# Patient Record
Sex: MALE | Race: WHITE | NOT HISPANIC OR LATINO | Employment: FULL TIME | URBAN - METROPOLITAN AREA
[De-identification: names, ages, dates, MRNs, and addresses within clinical notes are randomized per-mention and may not be internally consistent; named-entity substitution may affect disease eponyms.]

---

## 2023-12-29 ENCOUNTER — APPOINTMENT (OUTPATIENT)
Dept: RADIOLOGY | Facility: HOSPITAL | Age: 43
End: 2023-12-29
Payer: COMMERCIAL

## 2023-12-29 ENCOUNTER — APPOINTMENT (OUTPATIENT)
Dept: CARDIOLOGY | Facility: HOSPITAL | Age: 43
End: 2023-12-29
Payer: COMMERCIAL

## 2023-12-29 ENCOUNTER — HOSPITAL ENCOUNTER (INPATIENT)
Facility: HOSPITAL | Age: 43
LOS: 9 days | Discharge: HOME | End: 2024-01-07
Attending: STUDENT IN AN ORGANIZED HEALTH CARE EDUCATION/TRAINING PROGRAM | Admitting: INTERNAL MEDICINE
Payer: COMMERCIAL

## 2023-12-29 DIAGNOSIS — F41.9 ANXIETY AND DEPRESSION: ICD-10-CM

## 2023-12-29 DIAGNOSIS — I95.89 HYPOTENSION DUE TO HYPOVOLEMIA: ICD-10-CM

## 2023-12-29 DIAGNOSIS — F17.219 CIGARETTE NICOTINE DEPENDENCE WITH NICOTINE-INDUCED DISORDER: ICD-10-CM

## 2023-12-29 DIAGNOSIS — E87.1 HYPONATREMIA: ICD-10-CM

## 2023-12-29 DIAGNOSIS — F10.930 ALCOHOL WITHDRAWAL SYNDROME, UNCOMPLICATED (MULTI): Primary | ICD-10-CM

## 2023-12-29 DIAGNOSIS — F10.10 ALCOHOL ABUSE: ICD-10-CM

## 2023-12-29 DIAGNOSIS — E13.69 OTHER SPECIFIED DIABETES MELLITUS WITH OTHER SPECIFIED COMPLICATION, WITHOUT LONG-TERM CURRENT USE OF INSULIN (MULTI): ICD-10-CM

## 2023-12-29 DIAGNOSIS — F32.A ANXIETY AND DEPRESSION: ICD-10-CM

## 2023-12-29 DIAGNOSIS — R74.01 TRANSAMINITIS: ICD-10-CM

## 2023-12-29 DIAGNOSIS — I10 PRIMARY HYPERTENSION: ICD-10-CM

## 2023-12-29 DIAGNOSIS — E86.1 HYPOTENSION DUE TO HYPOVOLEMIA: ICD-10-CM

## 2023-12-29 DIAGNOSIS — E86.0 DEHYDRATION: ICD-10-CM

## 2023-12-29 DIAGNOSIS — N17.9 AKI (ACUTE KIDNEY INJURY) (CMS-HCC): ICD-10-CM

## 2023-12-29 DIAGNOSIS — G62.9 NEUROPATHY: ICD-10-CM

## 2023-12-29 DIAGNOSIS — K21.9 CHRONIC GERD: ICD-10-CM

## 2023-12-29 DIAGNOSIS — R07.9 CHEST PAIN, UNSPECIFIED TYPE: ICD-10-CM

## 2023-12-29 PROBLEM — R73.9 HYPERGLYCEMIA: Status: ACTIVE | Noted: 2023-12-29

## 2023-12-29 PROBLEM — R79.89 ELEVATED LFTS: Status: ACTIVE | Noted: 2023-12-29

## 2023-12-29 PROBLEM — F10.982 ALCOHOL-INDUCED INSOMNIA (MULTI): Status: ACTIVE | Noted: 2023-12-29

## 2023-12-29 LAB
ALBUMIN SERPL BCP-MCNC: 4.3 G/DL (ref 3.4–5)
ALP SERPL-CCNC: 96 U/L (ref 33–120)
ALT SERPL W P-5'-P-CCNC: 236 U/L (ref 10–52)
AMPHETAMINES UR QL SCN: NORMAL
ANION GAP BLDV CALCULATED.4IONS-SCNC: 10 MMOL/L (ref 10–25)
ANION GAP SERPL CALC-SCNC: 17 MMOL/L (ref 10–20)
APAP SERPL-MCNC: <10 UG/ML
AST SERPL W P-5'-P-CCNC: 139 U/L (ref 9–39)
BARBITURATES UR QL SCN: NORMAL
BASE EXCESS BLDV CALC-SCNC: -0.5 MMOL/L (ref -2–3)
BASOPHILS # BLD AUTO: 0.07 X10*3/UL (ref 0–0.1)
BASOPHILS NFR BLD AUTO: 0.8 %
BENZODIAZ UR QL SCN: NORMAL
BILIRUB SERPL-MCNC: 0.7 MG/DL (ref 0–1.2)
BNP SERPL-MCNC: 6 PG/ML (ref 0–99)
BODY TEMPERATURE: ABNORMAL
BUN SERPL-MCNC: 10 MG/DL (ref 6–23)
BZE UR QL SCN: NORMAL
CA-I BLDV-SCNC: 1.22 MMOL/L (ref 1.1–1.33)
CALCIUM SERPL-MCNC: 9.4 MG/DL (ref 8.6–10.3)
CANNABINOIDS UR QL SCN: NORMAL
CARDIAC TROPONIN I PNL SERPL HS: 5 NG/L (ref 0–20)
CHLORIDE BLDV-SCNC: 101 MMOL/L (ref 98–107)
CHLORIDE SERPL-SCNC: 96 MMOL/L (ref 98–107)
CO2 SERPL-SCNC: 22 MMOL/L (ref 21–32)
CREAT SERPL-MCNC: 0.84 MG/DL (ref 0.5–1.3)
D DIMER PPP FEU-MCNC: 299 NG/ML FEU
EOSINOPHIL # BLD AUTO: 0.08 X10*3/UL (ref 0–0.7)
EOSINOPHIL NFR BLD AUTO: 0.9 %
ERYTHROCYTE [DISTWIDTH] IN BLOOD BY AUTOMATED COUNT: 13.2 % (ref 11.5–14.5)
ETHANOL SERPL-MCNC: 61 MG/DL
FENTANYL+NORFENTANYL UR QL SCN: NORMAL
FLUAV RNA RESP QL NAA+PROBE: NOT DETECTED
FLUBV RNA RESP QL NAA+PROBE: NOT DETECTED
GFR SERPL CREATININE-BSD FRML MDRD: >90 ML/MIN/1.73M*2
GLUCOSE BLDV-MCNC: 237 MG/DL (ref 74–99)
GLUCOSE SERPL-MCNC: 277 MG/DL (ref 74–99)
HCO3 BLDV-SCNC: 24.2 MMOL/L (ref 22–26)
HCT VFR BLD AUTO: 48 % (ref 41–52)
HCT VFR BLD EST: 51 % (ref 41–52)
HGB BLD-MCNC: 16.6 G/DL (ref 13.5–17.5)
HGB BLDV-MCNC: 17 G/DL (ref 13.5–17.5)
IMM GRANULOCYTES # BLD AUTO: 0.02 X10*3/UL (ref 0–0.7)
IMM GRANULOCYTES NFR BLD AUTO: 0.2 % (ref 0–0.9)
INHALED O2 CONCENTRATION: 21 %
INR PPP: 1 (ref 0.9–1.1)
LACTATE BLDV-SCNC: 2 MMOL/L (ref 0.4–2)
LACTATE BLDV-SCNC: 3.5 MMOL/L (ref 0.4–2)
LYMPHOCYTES # BLD AUTO: 2.07 X10*3/UL (ref 1.2–4.8)
LYMPHOCYTES NFR BLD AUTO: 23.2 %
MAGNESIUM SERPL-MCNC: 2.23 MG/DL (ref 1.6–2.4)
MCH RBC QN AUTO: 35.4 PG (ref 26–34)
MCHC RBC AUTO-ENTMCNC: 34.6 G/DL (ref 32–36)
MCV RBC AUTO: 102 FL (ref 80–100)
MONOCYTES # BLD AUTO: 0.67 X10*3/UL (ref 0.1–1)
MONOCYTES NFR BLD AUTO: 7.5 %
NEUTROPHILS # BLD AUTO: 6 X10*3/UL (ref 1.2–7.7)
NEUTROPHILS NFR BLD AUTO: 67.4 %
NRBC BLD-RTO: 0 /100 WBCS (ref 0–0)
OPIATES UR QL SCN: NORMAL
OXYCODONE+OXYMORPHONE UR QL SCN: NORMAL
OXYHGB MFR BLDV: 88.9 % (ref 45–75)
PCO2 BLDV: 39 MM HG (ref 41–51)
PCP UR QL SCN: NORMAL
PH BLDV: 7.4 PH (ref 7.33–7.43)
PLATELET # BLD AUTO: 212 X10*3/UL (ref 150–450)
PO2 BLDV: 67 MM HG (ref 35–45)
POTASSIUM BLDV-SCNC: 3.7 MMOL/L (ref 3.5–5.3)
POTASSIUM SERPL-SCNC: 3.6 MMOL/L (ref 3.5–5.3)
PROT SERPL-MCNC: 7.5 G/DL (ref 6.4–8.2)
PROTHROMBIN TIME: 11.8 SECONDS (ref 9.8–12.8)
RBC # BLD AUTO: 4.69 X10*6/UL (ref 4.5–5.9)
SALICYLATES SERPL-MCNC: <3 MG/DL
SAO2 % BLDV: 94 % (ref 45–75)
SARS-COV-2 RNA RESP QL NAA+PROBE: NOT DETECTED
SODIUM BLDV-SCNC: 131 MMOL/L (ref 136–145)
SODIUM SERPL-SCNC: 131 MMOL/L (ref 136–145)
TSH SERPL-ACNC: 1.9 MIU/L (ref 0.44–3.98)
WBC # BLD AUTO: 8.9 X10*3/UL (ref 4.4–11.3)

## 2023-12-29 PROCEDURE — 80307 DRUG TEST PRSMV CHEM ANLYZR: CPT | Performed by: NURSE PRACTITIONER

## 2023-12-29 PROCEDURE — 96376 TX/PRO/DX INJ SAME DRUG ADON: CPT

## 2023-12-29 PROCEDURE — 84132 ASSAY OF SERUM POTASSIUM: CPT | Performed by: STUDENT IN AN ORGANIZED HEALTH CARE EDUCATION/TRAINING PROGRAM

## 2023-12-29 PROCEDURE — 87636 SARSCOV2 & INF A&B AMP PRB: CPT | Performed by: STUDENT IN AN ORGANIZED HEALTH CARE EDUCATION/TRAINING PROGRAM

## 2023-12-29 PROCEDURE — 82306 VITAMIN D 25 HYDROXY: CPT | Mod: STJLAB | Performed by: NURSE PRACTITIONER

## 2023-12-29 PROCEDURE — 84484 ASSAY OF TROPONIN QUANT: CPT | Performed by: STUDENT IN AN ORGANIZED HEALTH CARE EDUCATION/TRAINING PROGRAM

## 2023-12-29 PROCEDURE — 96374 THER/PROPH/DIAG INJ IV PUSH: CPT

## 2023-12-29 PROCEDURE — 80053 COMPREHEN METABOLIC PANEL: CPT | Performed by: STUDENT IN AN ORGANIZED HEALTH CARE EDUCATION/TRAINING PROGRAM

## 2023-12-29 PROCEDURE — 83880 ASSAY OF NATRIURETIC PEPTIDE: CPT | Performed by: STUDENT IN AN ORGANIZED HEALTH CARE EDUCATION/TRAINING PROGRAM

## 2023-12-29 PROCEDURE — 93005 ELECTROCARDIOGRAM TRACING: CPT

## 2023-12-29 PROCEDURE — 72100 X-RAY EXAM L-S SPINE 2/3 VWS: CPT | Performed by: RADIOLOGY

## 2023-12-29 PROCEDURE — 85025 COMPLETE CBC W/AUTO DIFF WBC: CPT | Performed by: STUDENT IN AN ORGANIZED HEALTH CARE EDUCATION/TRAINING PROGRAM

## 2023-12-29 PROCEDURE — 83735 ASSAY OF MAGNESIUM: CPT | Performed by: NURSE PRACTITIONER

## 2023-12-29 PROCEDURE — 85610 PROTHROMBIN TIME: CPT | Performed by: STUDENT IN AN ORGANIZED HEALTH CARE EDUCATION/TRAINING PROGRAM

## 2023-12-29 PROCEDURE — 2500000002 HC RX 250 W HCPCS SELF ADMINISTERED DRUGS (ALT 637 FOR MEDICARE OP, ALT 636 FOR OP/ED): Performed by: NURSE PRACTITIONER

## 2023-12-29 PROCEDURE — 71045 X-RAY EXAM CHEST 1 VIEW: CPT | Performed by: RADIOLOGY

## 2023-12-29 PROCEDURE — 2500000001 HC RX 250 WO HCPCS SELF ADMINISTERED DRUGS (ALT 637 FOR MEDICARE OP): Performed by: STUDENT IN AN ORGANIZED HEALTH CARE EDUCATION/TRAINING PROGRAM

## 2023-12-29 PROCEDURE — 2500000004 HC RX 250 GENERAL PHARMACY W/ HCPCS (ALT 636 FOR OP/ED): Performed by: STUDENT IN AN ORGANIZED HEALTH CARE EDUCATION/TRAINING PROGRAM

## 2023-12-29 PROCEDURE — 84443 ASSAY THYROID STIM HORMONE: CPT | Performed by: NURSE PRACTITIONER

## 2023-12-29 PROCEDURE — 83036 HEMOGLOBIN GLYCOSYLATED A1C: CPT | Mod: STJLAB | Performed by: NURSE PRACTITIONER

## 2023-12-29 PROCEDURE — 71045 X-RAY EXAM CHEST 1 VIEW: CPT

## 2023-12-29 PROCEDURE — 96375 TX/PRO/DX INJ NEW DRUG ADDON: CPT

## 2023-12-29 PROCEDURE — 2500000004 HC RX 250 GENERAL PHARMACY W/ HCPCS (ALT 636 FOR OP/ED): Performed by: NURSE PRACTITIONER

## 2023-12-29 PROCEDURE — 1200000002 HC GENERAL ROOM WITH TELEMETRY DAILY

## 2023-12-29 PROCEDURE — 93010 ELECTROCARDIOGRAM REPORT: CPT | Performed by: STUDENT IN AN ORGANIZED HEALTH CARE EDUCATION/TRAINING PROGRAM

## 2023-12-29 PROCEDURE — 99285 EMERGENCY DEPT VISIT HI MDM: CPT | Performed by: STUDENT IN AN ORGANIZED HEALTH CARE EDUCATION/TRAINING PROGRAM

## 2023-12-29 PROCEDURE — S4991 NICOTINE PATCH NONLEGEND: HCPCS | Performed by: NURSE PRACTITIONER

## 2023-12-29 PROCEDURE — 36415 COLL VENOUS BLD VENIPUNCTURE: CPT | Performed by: STUDENT IN AN ORGANIZED HEALTH CARE EDUCATION/TRAINING PROGRAM

## 2023-12-29 PROCEDURE — 83605 ASSAY OF LACTIC ACID: CPT | Performed by: STUDENT IN AN ORGANIZED HEALTH CARE EDUCATION/TRAINING PROGRAM

## 2023-12-29 PROCEDURE — 96372 THER/PROPH/DIAG INJ SC/IM: CPT | Mod: 25

## 2023-12-29 PROCEDURE — 99223 1ST HOSP IP/OBS HIGH 75: CPT | Performed by: NURSE PRACTITIONER

## 2023-12-29 PROCEDURE — 85379 FIBRIN DEGRADATION QUANT: CPT | Performed by: STUDENT IN AN ORGANIZED HEALTH CARE EDUCATION/TRAINING PROGRAM

## 2023-12-29 PROCEDURE — 80143 DRUG ASSAY ACETAMINOPHEN: CPT | Performed by: NURSE PRACTITIONER

## 2023-12-29 PROCEDURE — 2500000001 HC RX 250 WO HCPCS SELF ADMINISTERED DRUGS (ALT 637 FOR MEDICARE OP): Performed by: NURSE PRACTITIONER

## 2023-12-29 PROCEDURE — 72100 X-RAY EXAM L-S SPINE 2/3 VWS: CPT

## 2023-12-29 RX ORDER — POLYETHYLENE GLYCOL 3350 17 G/17G
17 POWDER, FOR SOLUTION ORAL DAILY PRN
Status: DISCONTINUED | OUTPATIENT
Start: 2023-12-29 | End: 2024-01-07 | Stop reason: HOSPADM

## 2023-12-29 RX ORDER — GABAPENTIN 600 MG/1
600 TABLET ORAL 3 TIMES DAILY
Status: ON HOLD | COMMUNITY
End: 2024-01-07 | Stop reason: SDUPTHER

## 2023-12-29 RX ORDER — LORAZEPAM 2 MG/ML
1 INJECTION INTRAMUSCULAR ONCE
Status: COMPLETED | OUTPATIENT
Start: 2023-12-29 | End: 2023-12-29

## 2023-12-29 RX ORDER — LORAZEPAM 2 MG/ML
0.5 INJECTION INTRAMUSCULAR EVERY 2 HOUR PRN
Status: DISCONTINUED | OUTPATIENT
Start: 2023-12-29 | End: 2024-01-02

## 2023-12-29 RX ORDER — BUPRENORPHINE HYDROCHLORIDE AND NALOXONE HYDROCHLORIDE DIHYDRATE 8; 2 MG/1; MG/1
2 TABLET SUBLINGUAL DAILY
COMMUNITY

## 2023-12-29 RX ORDER — ASPIRIN 325 MG
325 TABLET ORAL ONCE
Status: COMPLETED | OUTPATIENT
Start: 2023-12-29 | End: 2023-12-29

## 2023-12-29 RX ORDER — SODIUM CHLORIDE 9 MG/ML
75 INJECTION, SOLUTION INTRAVENOUS CONTINUOUS
Status: ACTIVE | OUTPATIENT
Start: 2023-12-29 | End: 2023-12-30

## 2023-12-29 RX ORDER — LOSARTAN POTASSIUM AND HYDROCHLOROTHIAZIDE 12.5; 1 MG/1; MG/1
1 TABLET ORAL DAILY
COMMUNITY
End: 2024-01-07 | Stop reason: HOSPADM

## 2023-12-29 RX ORDER — ONDANSETRON HYDROCHLORIDE 2 MG/ML
4 INJECTION, SOLUTION INTRAVENOUS ONCE
Status: COMPLETED | OUTPATIENT
Start: 2023-12-29 | End: 2023-12-29

## 2023-12-29 RX ORDER — FOLIC ACID 1 MG/1
1 TABLET ORAL DAILY
Status: DISCONTINUED | OUTPATIENT
Start: 2023-12-29 | End: 2024-01-07 | Stop reason: HOSPADM

## 2023-12-29 RX ORDER — AMLODIPINE BESYLATE 10 MG/1
10 TABLET ORAL DAILY
Status: ON HOLD | COMMUNITY
End: 2024-01-07 | Stop reason: SDUPTHER

## 2023-12-29 RX ORDER — MULTIVIT-MIN/IRON FUM/FOLIC AC 7.5 MG-4
1 TABLET ORAL DAILY
Status: DISCONTINUED | OUTPATIENT
Start: 2023-12-29 | End: 2024-01-07 | Stop reason: HOSPADM

## 2023-12-29 RX ORDER — POTASSIUM CHLORIDE 20 MEQ/1
40 TABLET, EXTENDED RELEASE ORAL ONCE
Status: COMPLETED | OUTPATIENT
Start: 2023-12-29 | End: 2023-12-29

## 2023-12-29 RX ORDER — VENLAFAXINE HYDROCHLORIDE 150 MG/1
150 CAPSULE, EXTENDED RELEASE ORAL DAILY
Status: ON HOLD | COMMUNITY
End: 2024-01-07 | Stop reason: SDUPTHER

## 2023-12-29 RX ORDER — PANTOPRAZOLE SODIUM 40 MG/1
40 TABLET, DELAYED RELEASE ORAL
Status: DISCONTINUED | OUTPATIENT
Start: 2023-12-29 | End: 2024-01-07 | Stop reason: HOSPADM

## 2023-12-29 RX ORDER — IBUPROFEN 200 MG
1 TABLET ORAL DAILY
Status: DISCONTINUED | OUTPATIENT
Start: 2023-12-29 | End: 2024-01-07 | Stop reason: HOSPADM

## 2023-12-29 RX ORDER — ENOXAPARIN SODIUM 100 MG/ML
40 INJECTION SUBCUTANEOUS EVERY 24 HOURS
Status: DISCONTINUED | OUTPATIENT
Start: 2023-12-29 | End: 2024-01-07 | Stop reason: HOSPADM

## 2023-12-29 RX ORDER — LORAZEPAM 2 MG/ML
1 INJECTION INTRAMUSCULAR EVERY 2 HOUR PRN
Status: DISCONTINUED | OUTPATIENT
Start: 2023-12-29 | End: 2024-01-02

## 2023-12-29 RX ORDER — QUETIAPINE FUMARATE 25 MG/1
25 TABLET, FILM COATED ORAL NIGHTLY
Status: DISCONTINUED | OUTPATIENT
Start: 2023-12-29 | End: 2024-01-07 | Stop reason: HOSPADM

## 2023-12-29 RX ORDER — DIAZEPAM 5 MG/1
5 TABLET ORAL EVERY 6 HOURS SCHEDULED
Status: DISCONTINUED | OUTPATIENT
Start: 2023-12-29 | End: 2023-12-31

## 2023-12-29 RX ORDER — BUPRENORPHINE HYDROCHLORIDE AND NALOXONE HYDROCHLORIDE DIHYDRATE 8; 2 MG/1; MG/1
1 TABLET SUBLINGUAL EVERY 12 HOURS
Status: DISCONTINUED | OUTPATIENT
Start: 2023-12-30 | End: 2023-12-30

## 2023-12-29 RX ORDER — HYDROXYZINE HYDROCHLORIDE 25 MG/1
25 TABLET, FILM COATED ORAL 3 TIMES DAILY
Status: ON HOLD | COMMUNITY
End: 2024-01-07 | Stop reason: SDUPTHER

## 2023-12-29 RX ORDER — LANOLIN ALCOHOL/MO/W.PET/CERES
100 CREAM (GRAM) TOPICAL DAILY
Status: DISCONTINUED | OUTPATIENT
Start: 2023-12-29 | End: 2024-01-02

## 2023-12-29 RX ORDER — NICOTINE 7MG/24HR
1 PATCH, TRANSDERMAL 24 HOURS TRANSDERMAL DAILY
Status: DISCONTINUED | OUTPATIENT
Start: 2024-02-09 | End: 2024-01-07 | Stop reason: HOSPADM

## 2023-12-29 RX ORDER — AMLODIPINE BESYLATE 10 MG/1
10 TABLET ORAL DAILY
Status: DISCONTINUED | OUTPATIENT
Start: 2023-12-29 | End: 2024-01-07 | Stop reason: HOSPADM

## 2023-12-29 RX ORDER — ONDANSETRON HYDROCHLORIDE 2 MG/ML
4 INJECTION, SOLUTION INTRAVENOUS EVERY 6 HOURS PRN
Status: DISCONTINUED | OUTPATIENT
Start: 2023-12-29 | End: 2024-01-07 | Stop reason: HOSPADM

## 2023-12-29 RX ORDER — OMEPRAZOLE 20 MG/1
20 CAPSULE, DELAYED RELEASE ORAL
Status: ON HOLD | COMMUNITY
End: 2024-01-07 | Stop reason: SDUPTHER

## 2023-12-29 RX ORDER — VENLAFAXINE HYDROCHLORIDE 75 MG/1
150 CAPSULE, EXTENDED RELEASE ORAL DAILY
Status: DISCONTINUED | OUTPATIENT
Start: 2023-12-29 | End: 2023-12-31

## 2023-12-29 RX ORDER — LORAZEPAM 2 MG/ML
2 INJECTION INTRAMUSCULAR EVERY 2 HOUR PRN
Status: DISCONTINUED | OUTPATIENT
Start: 2023-12-29 | End: 2024-01-02

## 2023-12-29 RX ORDER — GABAPENTIN 600 MG/1
600 TABLET ORAL 3 TIMES DAILY
Status: DISCONTINUED | OUTPATIENT
Start: 2023-12-29 | End: 2024-01-04

## 2023-12-29 RX ADMIN — LORAZEPAM 1 MG: 2 INJECTION, SOLUTION INTRAMUSCULAR; INTRAVENOUS at 10:07

## 2023-12-29 RX ADMIN — LORAZEPAM 2 MG: 2 INJECTION, SOLUTION INTRAMUSCULAR; INTRAVENOUS at 18:58

## 2023-12-29 RX ADMIN — NICOTINE 1 PATCH: 14 PATCH, EXTENDED RELEASE TRANSDERMAL at 16:56

## 2023-12-29 RX ADMIN — LORAZEPAM 2 MG: 2 INJECTION, SOLUTION INTRAMUSCULAR; INTRAVENOUS at 14:53

## 2023-12-29 RX ADMIN — AMLODIPINE BESYLATE 10 MG: 10 TABLET ORAL at 16:56

## 2023-12-29 RX ADMIN — SODIUM CHLORIDE 1000 ML: 9 INJECTION, SOLUTION INTRAVENOUS at 10:27

## 2023-12-29 RX ADMIN — DIAZEPAM 5 MG: 5 TABLET ORAL at 14:37

## 2023-12-29 RX ADMIN — LOSARTAN POTASSIUM: 100 TABLET, FILM COATED ORAL at 19:06

## 2023-12-29 RX ADMIN — LORAZEPAM 1 MG: 2 INJECTION, SOLUTION INTRAMUSCULAR; INTRAVENOUS at 23:21

## 2023-12-29 RX ADMIN — DIAZEPAM 5 MG: 5 TABLET ORAL at 17:50

## 2023-12-29 RX ADMIN — ENOXAPARIN SODIUM 40 MG: 40 INJECTION SUBCUTANEOUS at 16:56

## 2023-12-29 RX ADMIN — PANTOPRAZOLE SODIUM 40 MG: 40 TABLET, DELAYED RELEASE ORAL at 17:50

## 2023-12-29 RX ADMIN — LORAZEPAM 2 MG: 2 INJECTION, SOLUTION INTRAMUSCULAR; INTRAVENOUS at 20:50

## 2023-12-29 RX ADMIN — GABAPENTIN 600 MG: 300 CAPSULE ORAL at 20:49

## 2023-12-29 RX ADMIN — LORAZEPAM 2 MG: 2 INJECTION, SOLUTION INTRAMUSCULAR; INTRAVENOUS at 16:56

## 2023-12-29 RX ADMIN — ONDANSETRON 4 MG: 2 INJECTION INTRAMUSCULAR; INTRAVENOUS at 18:58

## 2023-12-29 RX ADMIN — GABAPENTIN 600 MG: 300 CAPSULE ORAL at 16:56

## 2023-12-29 RX ADMIN — Medication 1 TABLET: at 16:56

## 2023-12-29 RX ADMIN — SODIUM CHLORIDE 75 ML/HR: 9 INJECTION, SOLUTION INTRAVENOUS at 17:51

## 2023-12-29 RX ADMIN — QUETIAPINE FUMARATE 25 MG: 25 TABLET ORAL at 20:49

## 2023-12-29 RX ADMIN — THIAMINE HCL TAB 100 MG 100 MG: 100 TAB at 16:56

## 2023-12-29 RX ADMIN — ASPIRIN 325 MG ORAL TABLET 325 MG: 325 PILL ORAL at 10:07

## 2023-12-29 RX ADMIN — FOLIC ACID 1 MG: 1 TABLET ORAL at 16:56

## 2023-12-29 RX ADMIN — POTASSIUM CHLORIDE 40 MEQ: 1500 TABLET, EXTENDED RELEASE ORAL at 16:56

## 2023-12-29 RX ADMIN — ONDANSETRON 4 MG: 2 INJECTION INTRAMUSCULAR; INTRAVENOUS at 10:07

## 2023-12-29 RX ADMIN — VENLAFAXINE HYDROCHLORIDE 150 MG: 75 CAPSULE, EXTENDED RELEASE ORAL at 19:06

## 2023-12-29 SDOH — SOCIAL STABILITY: SOCIAL INSECURITY: ARE THERE ANY APPARENT SIGNS OF INJURIES/BEHAVIORS THAT COULD BE RELATED TO ABUSE/NEGLECT?: NO

## 2023-12-29 SDOH — SOCIAL STABILITY: SOCIAL INSECURITY: ABUSE: ADULT

## 2023-12-29 SDOH — SOCIAL STABILITY: SOCIAL INSECURITY: WERE YOU ABLE TO COMPLETE ALL THE BEHAVIORAL HEALTH SCREENINGS?: YES

## 2023-12-29 SDOH — SOCIAL STABILITY: SOCIAL INSECURITY: DO YOU FEEL ANYONE HAS EXPLOITED OR TAKEN ADVANTAGE OF YOU FINANCIALLY OR OF YOUR PERSONAL PROPERTY?: NO

## 2023-12-29 SDOH — SOCIAL STABILITY: SOCIAL INSECURITY: DO YOU FEEL UNSAFE GOING BACK TO THE PLACE WHERE YOU ARE LIVING?: NO

## 2023-12-29 SDOH — SOCIAL STABILITY: SOCIAL INSECURITY: HAS ANYONE EVER THREATENED TO HURT YOUR FAMILY OR YOUR PETS?: NO

## 2023-12-29 SDOH — SOCIAL STABILITY: SOCIAL INSECURITY: DOES ANYONE TRY TO KEEP YOU FROM HAVING/CONTACTING OTHER FRIENDS OR DOING THINGS OUTSIDE YOUR HOME?: NO

## 2023-12-29 SDOH — SOCIAL STABILITY: SOCIAL INSECURITY: ARE YOU OR HAVE YOU BEEN THREATENED OR ABUSED PHYSICALLY, EMOTIONALLY, OR SEXUALLY BY ANYONE?: NO

## 2023-12-29 SDOH — SOCIAL STABILITY: SOCIAL INSECURITY: HAVE YOU HAD THOUGHTS OF HARMING ANYONE ELSE?: NO

## 2023-12-29 ASSESSMENT — LIFESTYLE VARIABLES
TOTAL SCORE: 1
ANXIETY: MILDLY ANXIOUS
PAROXYSMAL SWEATS: 3
BLOOD PRESSURE: 168/92
AGITATION: NORMAL ACTIVITY
PAROXYSMAL SWEATS: BARELY PERCEPTIBLE SWEATING, PALMS MOIST
TOTAL SCORE: 12
NAUSEA AND VOMITING: INTERMITTENT NAUSEA WITH DRY HEAVES
AUDIT TOTAL SCORE: 4
PAROXYSMAL SWEATS: BARELY PERCEPTIBLE SWEATING, PALMS MOIST
BLOOD PRESSURE: 145/81
AUDITORY DISTURBANCES: MODERATE HARSHNESS OR ABILITY TO FRIGHTEN
HEADACHE, FULLNESS IN HEAD: NOT PRESENT
AUDITORY DISTURBANCES: NOT PRESENT
SKIP TO QUESTIONS 9-10: 0
AUDITORY DISTURBANCES: NOT PRESENT
ORIENTATION AND CLOUDING OF SENSORIUM: ORIENTED AND CAN DO SERIAL ADDITIONS
NAUSEA AND VOMITING: 3
VISUAL DISTURBANCES: MODERATE SENSITIVITY
BLOOD PRESSURE: 160/88
REASON UNABLE TO ASSESS: NO
NAUSEA AND VOMITING: 3
HOW OFTEN DO YOU HAVE A DRINK CONTAINING ALCOHOL: 4 OR MORE TIMES A WEEK
ANXIETY: NO ANXIETY, AT EASE
VISUAL DISTURBANCES: NOT PRESENT
HAVE PEOPLE ANNOYED YOU BY CRITICIZING YOUR DRINKING: NO
TOTAL SCORE: 8
VISUAL DISTURBANCES: NOT PRESENT
VISUAL DISTURBANCES: NOT PRESENT
TREMOR: NOT VISIBLE, BUT CAN BE FELT FINGERTIP TO FINGERTIP
PAROXYSMAL SWEATS: NO SWEAT VISIBLE
PAROXYSMAL SWEATS: 3
HEADACHE, FULLNESS IN HEAD: VERY MILD
TREMOR: NOT VISIBLE, BUT CAN BE FELT FINGERTIP TO FINGERTIP
VISUAL DISTURBANCES: NOT PRESENT
TACTILE DISTURBANCES: VERY MILD ITCHING, PINS AND NEEDLES, BURNING OR NUMBNESS
HOW OFTEN DURING THE LAST YEAR HAVE YOU FOUND THAT YOU WERE NOT ABLE TO STOP DRINKING ONCE YOU HAD STARTED: DAILY OR ALMOST DAILY
VISUAL DISTURBANCES: NOT PRESENT
TACTILE DISTURBANCES: MODERATE ITCHING, PINS AND NEEDLES, BURNING OR NUMBNESS
ORIENTATION AND CLOUDING OF SENSORIUM: ORIENTED AND CAN DO SERIAL ADDITIONS
NAUSEA AND VOMITING: MILD NAUSEA WITH NO VOMITING
ANXIETY: 5
TREMOR: NOT VISIBLE, BUT CAN BE FELT FINGERTIP TO FINGERTIP
ANXIETY: 5
AUDITORY DISTURBANCES: MODERATE HARSHNESS OR ABILITY TO FRIGHTEN
HEADACHE, FULLNESS IN HEAD: MODERATE
ORIENTATION AND CLOUDING OF SENSORIUM: ORIENTED AND CAN DO SERIAL ADDITIONS
HEADACHE, FULLNESS IN HEAD: VERY MILD
ORIENTATION AND CLOUDING OF SENSORIUM: ORIENTED AND CAN DO SERIAL ADDITIONS
PULSE: 96
PULSE: 99
TACTILE DISTURBANCES: MILD ITCHING, PINS AND NEEDLES, BURNING OR NUMBNESS
ORIENTATION AND CLOUDING OF SENSORIUM: ORIENTED AND CAN DO SERIAL ADDITIONS
ANXIETY: MILDLY ANXIOUS
TACTILE DISTURBANCES: MODERATE ITCHING, PINS AND NEEDLES, BURNING OR NUMBNESS
AGITATION: SOMEWHAT MORE THAN NORMAL ACTIVITY
HOW OFTEN DO YOU HAVE 6 OR MORE DRINKS ON ONE OCCASION: DAILY OR ALMOST DAILY
ANXIETY: 3
HEADACHE, FULLNESS IN HEAD: MODERATE
TREMOR: NOT VISIBLE, BUT CAN BE FELT FINGERTIP TO FINGERTIP
HEADACHE, FULLNESS IN HEAD: MODERATE
PULSE: 95
EVER FELT BAD OR GUILTY ABOUT YOUR DRINKING: NO
TOTAL SCORE: 21
PAROXYSMAL SWEATS: NO SWEAT VISIBLE
HEADACHE, FULLNESS IN HEAD: MODERATE
TREMOR: NO TREMOR
HEADACHE, FULLNESS IN HEAD: NOT PRESENT
PAROXYSMAL SWEATS: NO SWEAT VISIBLE
TOTAL SCORE: 9
AUDITORY DISTURBANCES: MILD HARSHNESS OR ABILITY TO FRIGHTEN
TREMOR: NO TREMOR
AGITATION: SOMEWHAT MORE THAN NORMAL ACTIVITY
TREMOR: NOT VISIBLE, BUT CAN BE FELT FINGERTIP TO FINGERTIP
VISUAL DISTURBANCES: NOT PRESENT
ORIENTATION AND CLOUDING OF SENSORIUM: ORIENTED AND CAN DO SERIAL ADDITIONS
EVER HAD A DRINK FIRST THING IN THE MORNING TO STEADY YOUR NERVES TO GET RID OF A HANGOVER: NO
NAUSEA AND VOMITING: NO NAUSEA AND NO VOMITING
TACTILE DISTURBANCES: MILD ITCHING, PINS AND NEEDLES, BURNING OR NUMBNESS
AGITATION: SOMEWHAT MORE THAN NORMAL ACTIVITY
TACTILE DISTURBANCES: VERY MILD ITCHING, PINS AND NEEDLES, BURNING OR NUMBNESS
ANXIETY: MILDLY ANXIOUS
AUDITORY DISTURBANCES: NOT PRESENT
ORIENTATION AND CLOUDING OF SENSORIUM: ORIENTED AND CAN DO SERIAL ADDITIONS
AUDIT-C TOTAL SCORE: 11
VISUAL DISTURBANCES: MILD SENSITIVITY
ORIENTATION AND CLOUDING OF SENSORIUM: ORIENTED AND CAN DO SERIAL ADDITIONS
ANXIETY: 2
TOTAL SCORE: 19
AGITATION: SOMEWHAT MORE THAN NORMAL ACTIVITY
HOW MANY STANDARD DRINKS CONTAINING ALCOHOL DO YOU HAVE ON A TYPICAL DAY: 7 TO 9
PAROXYSMAL SWEATS: BARELY PERCEPTIBLE SWEATING, PALMS MOIST
NAUSEA AND VOMITING: NO NAUSEA AND NO VOMITING
TOTAL SCORE: 19
TOTAL SCORE: 0
AGITATION: NORMAL ACTIVITY
NAUSEA AND VOMITING: NO NAUSEA AND NO VOMITING
AGITATION: SOMEWHAT MORE THAN NORMAL ACTIVITY
AUDITORY DISTURBANCES: NOT PRESENT
AGITATION: SOMEWHAT MORE THAN NORMAL ACTIVITY
TREMOR: NO TREMOR
AUDITORY DISTURBANCES: VERY MILD HARSHNESS OR ABILITY TO FRIGHTEN
AUDIT-C TOTAL SCORE: 11
NAUSEA AND VOMITING: NO NAUSEA AND NO VOMITING
HAVE YOU EVER FELT YOU SHOULD CUT DOWN ON YOUR DRINKING: NO

## 2023-12-29 ASSESSMENT — COGNITIVE AND FUNCTIONAL STATUS - GENERAL
WALKING IN HOSPITAL ROOM: A LITTLE
MOBILITY SCORE: 20
CLIMB 3 TO 5 STEPS WITH RAILING: A LITTLE
MOVING TO AND FROM BED TO CHAIR: A LITTLE
STANDING UP FROM CHAIR USING ARMS: A LITTLE
DAILY ACTIVITIY SCORE: 23
TOILETING: A LITTLE
PATIENT BASELINE BEDBOUND: NO

## 2023-12-29 ASSESSMENT — PAIN DESCRIPTION - DESCRIPTORS: DESCRIPTORS: SHARP

## 2023-12-29 ASSESSMENT — ENCOUNTER SYMPTOMS
NERVOUS/ANXIOUS: 1
FREQUENCY: 0
ABDOMINAL PAIN: 0
SHORTNESS OF BREATH: 1
CHEST TIGHTNESS: 1
NAUSEA: 1
HEADACHES: 1
UNEXPECTED WEIGHT CHANGE: 1
SEIZURES: 0
PALPITATIONS: 0
HEMATURIA: 0
NUMBNESS: 1
VOMITING: 1
SLEEP DISTURBANCE: 1

## 2023-12-29 ASSESSMENT — PATIENT HEALTH QUESTIONNAIRE - PHQ9
SUM OF ALL RESPONSES TO PHQ9 QUESTIONS 1 & 2: 4
1. LITTLE INTEREST OR PLEASURE IN DOING THINGS: MORE THAN HALF THE DAYS
2. FEELING DOWN, DEPRESSED OR HOPELESS: MORE THAN HALF THE DAYS

## 2023-12-29 ASSESSMENT — ACTIVITIES OF DAILY LIVING (ADL)
DRESSING YOURSELF: INDEPENDENT
PATIENT'S MEMORY ADEQUATE TO SAFELY COMPLETE DAILY ACTIVITIES?: YES
GROOMING: INDEPENDENT
ADEQUATE_TO_COMPLETE_ADL: YES
JUDGMENT_ADEQUATE_SAFELY_COMPLETE_DAILY_ACTIVITIES: NO
BATHING: INDEPENDENT
WALKS IN HOME: INDEPENDENT
TOILETING: INDEPENDENT
HEARING - RIGHT EAR: FUNCTIONAL
FEEDING YOURSELF: INDEPENDENT
LACK_OF_TRANSPORTATION: NO
HEARING - LEFT EAR: FUNCTIONAL

## 2023-12-29 ASSESSMENT — PAIN SCALES - GENERAL: PAINLEVEL_OUTOF10: 5 - MODERATE PAIN

## 2023-12-29 ASSESSMENT — PAIN DESCRIPTION - PAIN TYPE: TYPE: ACUTE PAIN

## 2023-12-29 ASSESSMENT — PAIN DESCRIPTION - DIRECTION: RADIATING_TOWARDS: NECK

## 2023-12-29 ASSESSMENT — COLUMBIA-SUICIDE SEVERITY RATING SCALE - C-SSRS
6. HAVE YOU EVER DONE ANYTHING, STARTED TO DO ANYTHING, OR PREPARED TO DO ANYTHING TO END YOUR LIFE?: NO
1. IN THE PAST MONTH, HAVE YOU WISHED YOU WERE DEAD OR WISHED YOU COULD GO TO SLEEP AND NOT WAKE UP?: NO
2. HAVE YOU ACTUALLY HAD ANY THOUGHTS OF KILLING YOURSELF?: NO

## 2023-12-29 ASSESSMENT — PAIN - FUNCTIONAL ASSESSMENT: PAIN_FUNCTIONAL_ASSESSMENT: 0-10

## 2023-12-29 ASSESSMENT — PAIN DESCRIPTION - FREQUENCY: FREQUENCY: CONSTANT/CONTINUOUS

## 2023-12-29 ASSESSMENT — PAIN DESCRIPTION - PROGRESSION: CLINICAL_PROGRESSION: GRADUALLY WORSENING

## 2023-12-29 ASSESSMENT — PAIN DESCRIPTION - LOCATION: LOCATION: CHEST

## 2023-12-29 NOTE — H&P
History Of Present Illness  Deyvi Araujo is a 43 y.o. male with medical history of alcohol abuse, PTSD, history of polysubstance abuse, HTN, anxiety, depression, and GERD presented to [facility] on 12/29/2023 with complaint of anxiety, chest pain, nausea and vomiting.  Patient endorses severe anxiety to the point that he has nausea with emesis.  Patient complains of insomnia, stating that he has not slept in the last 4 nights.  Patient drinks approximately 10 small bottles of fireball a day.  Patient has trialed trazodone in the past for insomnia and states that this is ineffective.  Patient's last alcohol consumption was last evening.  Denies any history of withdrawal seizures or needing intensive care unit stay.  Patient endorses having numbness and tingling to all 4 extremities this appears to be a chronic problem when looking at previous notes patient's currently on gabapentin for this states it is ineffective.  Endorses having chest tightness and discomfort at night when he is trying to fall asleep.  Patient tells me that he falls asleep and then suddenly wakes up struggling to catch his breath which causes an increase in anxiety.  Patient likely has undiagnosed VU should follow-up outpatient for sleep study    Patient will be admitted to medical floor with telemetry for alcohol withdraw,  elevated LFTs, and hyperglycemia.  Psychiatry consult.    Past Medical History  Past Medical History:   Diagnosis Date    Alcohol abuse     Anxiety and depression     GERD (gastroesophageal reflux disease)     HLD (hyperlipidemia)     HTN (hypertension)     Obesity (BMI 30.0-34.9)     Polysubstance abuse (CMS/[facility])     PTSD (post-traumatic stress disorder)        Surgical History  No past surgical history on file.     Social History  Social History     Tobacco Use    Smoking status: Every Day     Packs/day: .5     Types: Cigarettes    Smokeless tobacco: Never   Substance Use Topics    Drug use: Not Currently        Family  "History  Family History   Problem Relation Name Age of Onset    Alcohol abuse Father          Allergies  Patient has no known allergies.    Review of Systems   Constitutional:  Positive for unexpected weight change.   Respiratory:  Positive for chest tightness and shortness of breath.    Cardiovascular:  Negative for palpitations and leg swelling.   Gastrointestinal:  Positive for nausea and vomiting. Negative for abdominal pain.   Genitourinary:  Negative for frequency and hematuria.   Skin: Negative.    Neurological:  Positive for numbness and headaches. Negative for seizures and syncope.   Psychiatric/Behavioral:  Positive for sleep disturbance. The patient is nervous/anxious.        Physical Exam  Constitutional:       General: He is not in acute distress.     Appearance: He is not ill-appearing, toxic-appearing or diaphoretic.   Eyes:      Extraocular Movements: Extraocular movements intact.      Pupils: Pupils are equal, round, and reactive to light.   Cardiovascular:      Rate and Rhythm: Normal rate and regular rhythm.      Pulses: Normal pulses.      Heart sounds: Normal heart sounds.   Pulmonary:      Effort: Pulmonary effort is normal.      Breath sounds: Normal breath sounds.   Abdominal:      General: Abdomen is flat. Bowel sounds are normal.      Palpations: Abdomen is soft.   Musculoskeletal:         General: Normal range of motion.   Skin:     General: Skin is warm and dry.      Capillary Refill: Capillary refill takes less than 2 seconds.   Neurological:      General: No focal deficit present.      Mental Status: He is alert and oriented to person, place, and time.   Psychiatric:         Behavior: Behavior normal.         Thought Content: Thought content normal.         Judgment: Judgment normal.         Last Recorded Vitals  Visit Vitals  BP (!) 145/98   Pulse 86   Temp 36.8 °C (98.2 °F) (Temporal)   Resp 17   Ht 1.88 m (6' 2\")   Wt 127 kg (280 lb)   SpO2 96%   BMI 35.95 kg/m²   Smoking Status " Every Day   BSA 2.58 m²        Relevant Results  Results for orders placed or performed during the hospital encounter of 12/29/23 (from the past 24 hour(s))   CBC with Differential   Result Value Ref Range    WBC 8.9 4.4 - 11.3 x10*3/uL    nRBC 0.0 0.0 - 0.0 /100 WBCs    RBC 4.69 4.50 - 5.90 x10*6/uL    Hemoglobin 16.6 13.5 - 17.5 g/dL    Hematocrit 48.0 41.0 - 52.0 %     (H) 80 - 100 fL    MCH 35.4 (H) 26.0 - 34.0 pg    MCHC 34.6 32.0 - 36.0 g/dL    RDW 13.2 11.5 - 14.5 %    Platelets 212 150 - 450 x10*3/uL    Neutrophils % 67.4 40.0 - 80.0 %    Immature Granulocytes %, Automated 0.2 0.0 - 0.9 %    Lymphocytes % 23.2 13.0 - 44.0 %    Monocytes % 7.5 2.0 - 10.0 %    Eosinophils % 0.9 0.0 - 6.0 %    Basophils % 0.8 0.0 - 2.0 %    Neutrophils Absolute 6.00 1.20 - 7.70 x10*3/uL    Immature Granulocytes Absolute, Automated 0.02 0.00 - 0.70 x10*3/uL    Lymphocytes Absolute 2.07 1.20 - 4.80 x10*3/uL    Monocytes Absolute 0.67 0.10 - 1.00 x10*3/uL    Eosinophils Absolute 0.08 0.00 - 0.70 x10*3/uL    Basophils Absolute 0.07 0.00 - 0.10 x10*3/uL   Comprehensive Metabolic Panel   Result Value Ref Range    Glucose 277 (H) 74 - 99 mg/dL    Sodium 131 (L) 136 - 145 mmol/L    Potassium 3.6 3.5 - 5.3 mmol/L    Chloride 96 (L) 98 - 107 mmol/L    Bicarbonate 22 21 - 32 mmol/L    Anion Gap 17 10 - 20 mmol/L    Urea Nitrogen 10 6 - 23 mg/dL    Creatinine 0.84 0.50 - 1.30 mg/dL    eGFR >90 >60 mL/min/1.73m*2    Calcium 9.4 8.6 - 10.3 mg/dL    Albumin 4.3 3.4 - 5.0 g/dL    Alkaline Phosphatase 96 33 - 120 U/L    Total Protein 7.5 6.4 - 8.2 g/dL     (H) 9 - 39 U/L    Bilirubin, Total 0.7 0.0 - 1.2 mg/dL     (H) 10 - 52 U/L   Protime-INR   Result Value Ref Range    Protime 11.8 9.8 - 12.8 seconds    INR 1.0 0.9 - 1.1   Troponin I, High Sensitivity   Result Value Ref Range    Troponin I, High Sensitivity 5 0 - 20 ng/L   D-dimer, VTE Exclusion   Result Value Ref Range    D-Dimer, Quantitative VTE Exclusion 299 <=500  ng/mL FEU   BLOOD GAS VENOUS FULL PANEL   Result Value Ref Range    POCT pH, Venous 7.40 7.33 - 7.43 pH    POCT pCO2, Venous 39 (L) 41 - 51 mm Hg    POCT pO2, Venous 67 (H) 35 - 45 mm Hg    POCT SO2, Venous 94 (H) 45 - 75 %    POCT Oxy Hemoglobin, Venous 88.9 (H) 45.0 - 75.0 %    POCT Hematocrit Calculated, Venous 51.0 41.0 - 52.0 %    POCT Sodium, Venous 131 (L) 136 - 145 mmol/L    POCT Potassium, Venous 3.7 3.5 - 5.3 mmol/L    POCT Chloride, Venous 101 98 - 107 mmol/L    POCT Ionized Calicum, Venous 1.22 1.10 - 1.33 mmol/L    POCT Glucose, Venous 237 (H) 74 - 99 mg/dL    POCT Lactate, Venous 3.5 (H) 0.4 - 2.0 mmol/L    POCT Base Excess, Venous -0.5 -2.0 - 3.0 mmol/L    POCT HCO3 Calculated, Venous 24.2 22.0 - 26.0 mmol/L    POCT Hemoglobin, Venous 17.0 13.5 - 17.5 g/dL    POCT Anion Gap, Venous 10.0 10.0 - 25.0 mmol/L    Patient Temperature      FiO2 21 %   Sars-CoV-2 and Influenza A/B PCR   Result Value Ref Range    Flu A Result Not Detected Not Detected    Flu B Result Not Detected Not Detected    Coronavirus 2019, PCR Not Detected Not Detected   Blood Gas Lactic Acid, Venous   Result Value Ref Range    POCT Lactate, Venous 2.0 0.4 - 2.0 mmol/L   Drug Screen, Urine   Result Value Ref Range    Amphetamine Screen, Urine Presumptive Negative Presumptive Negative    Barbiturate Screen, Urine Presumptive Negative Presumptive Negative    Benzodiazepines Screen, Urine Presumptive Negative Presumptive Negative    Cannabinoid Screen, Urine Presumptive Negative Presumptive Negative    Cocaine Metabolite Screen, Urine Presumptive Negative Presumptive Negative    Fentanyl Screen, Urine Presumptive Negative Presumptive Negative    Opiate Screen, Urine Presumptive Negative Presumptive Negative    Oxycodone Screen, Urine Presumptive Negative Presumptive Negative    PCP Screen, Urine Presumptive Negative Presumptive Negative      Imaging:  XR chest 1 view   Final Result   1.  No evidence of acute cardiopulmonary process.                   MACRO:   None        Signed by: Yosef Shukla 12/29/2023 10:49 AM   Dictation workstation:   [id number]      XR lumbar spine 2-3 views    (Results Pending)     EKG:  No results found for this or any previous visit (from the past 4464 hour(s)).  Echo:  No results found for this or any previous visit.    Home Medications  Prior to Admission medications    Medication Sig Start Date End Date Taking? Authorizing Provider   amLODIPine (Norvasc) 10 mg tablet Take 1 tablet (10 mg) by mouth once daily.    Historical Provider, MD   buprenorphine-naloxone (Suboxone) 8-2 mg SL tablet Place 2 tablets under the tongue once daily.    Historical Provider, MD   gabapentin (Neurontin) 600 mg tablet Take 1 tablet (600 mg) by mouth 3 times a day.    Historical Provider, MD   hydrOXYzine HCL (Atarax) 25 mg tablet Take 1 tablet (25 mg) by mouth 3 times a day.    Historical Provider, MD   losartan-hydrochlorothiazide (Hyzaar) 100-12.5 mg tablet Take 1 tablet by mouth once daily.    Historical Provider, MD   omeprazole (PriLOSEC) 20 mg DR capsule Take 1 capsule (20 mg) by mouth once daily in the morning. Take before meals.    Historical Provider, MD   venlafaxine XR (Effexor-XR) 150 mg 24 hr capsule Take 1 capsule (150 mg) by mouth once daily.    Historical Provider, MD       Medications  Scheduled medications  [START ON 12/30/2023] buprenorphine-naloxone, 1 tablet, sublingual, q12h  diazePAM, 5 mg, oral, q6h HORTENSIA  enoxaparin, 40 mg, subcutaneous, q24h  folic acid, 1 mg, oral, Daily  multivitamin with minerals, 1 tablet, oral, Daily  potassium chloride CR, 40 mEq, oral, Once  QUEtiapine, 25 mg, oral, Nightly  thiamine, 100 mg, oral, Daily      Continuous medications     PRN medications  LORazepam, 0.5 mg, q2h PRN   Or  LORazepam, 1 mg, q2h PRN   Or  LORazepam, 2 mg, q2h PRN  ondansetron, 4 mg, q6h PRN  polyethylene glycol, 17 g, Daily PRN        Assessment/Plan   Principal Problem:    Alcohol withdrawal syndrome,  uncomplicated (CMS/HCC)  Active Problems:    Anxiety and depression    Elevated LFTs    Hyperglycemia    Alcohol-induced insomnia (CMS/HCC)  Insomnia   Hx of opiate addiction on Suboxone      Plan:  Admit to medical floor with telemetry every 4 vital signs  Seizure precautions  Psychiatry consult  Valium 5 mg every 6 hours scheduled May use as needed Ativan if needed  CIWA protocol  Elevated LFTs likely secondary to alcohol abuse will trend.  No history of diabetes or hyperglycemia on labs hemoglobin A1c ordered and pending  Patient appears to have chronic numbness and tingling to all extremities will resume gabapentin.  Check B12 and vitamin D levels  Presumed home Suboxone  Seroquel initiated for insomnia  If hemoglobin A1c is elevated should start on oral diabetes regimen    VTE prophylaxis:   DVT prophylaxis: subcutaneous Lovenox    Medication reconciliation to be completed when home medications are verified by pharmacy.   See additional orders for further plan of care.   Further evaluation and management per attending and consulting physicians.      Code Status  Full code    I spent 45 minutes in the professional and overall care of this patient.      Nikky Mei, APRN-CNP  [facility]  Pager [phone]

## 2023-12-29 NOTE — ED PROVIDER NOTES
HPI   Chief Complaint   Patient presents with    Chest Pain     Started 2 days ago, but woke up this morning and his left arm was numb       43-year-old male with a past medical history of anxiety, depression, polysubstance abuse, PTSD, hypertension presents to the emergency department for evaluation of chest pain, onset 2 days ago.  Associated with left arm pain, shortness of breath when trying to sleep.  Patient reports increasing anxiety over the last 2 weeks, vomiting every morning for 2 weeks, able to tolerate p.o. otherwise.  Denies any loss of consciousness, vision changes, speech changes, changes in strength or sensation or coordination.  No falls.    Past medical history: As above  Past surgical history: Reviewed  Social history: Everyday tobacco use, everyday alcohol use, history of polysubstance abuse                          Micky Coma Scale Score: 15                  Patient History   No past medical history on file.  No past surgical history on file.  No family history on file.  Social History     Tobacco Use    Smoking status: Not on file    Smokeless tobacco: Not on file   Substance Use Topics    Alcohol use: Not on file    Drug use: Not on file       Physical Exam   ED Triage Vitals [12/29/23 0910]   Temp Heart Rate Resp BP   36.8 °C (98.2 °F) 100 16 148/79      SpO2 Temp Source Heart Rate Source Patient Position   100 % Temporal Monitor Lying      BP Location FiO2 (%)     Left arm --       Physical Exam  Vitals and nursing note reviewed.   Constitutional:       General: He is not in acute distress.     Appearance: Normal appearance. He is not ill-appearing or toxic-appearing.   HENT:      Mouth/Throat:      Mouth: Mucous membranes are moist.   Eyes:      General:         Right eye: No discharge.         Left eye: No discharge.      Conjunctiva/sclera: Conjunctivae normal.   Cardiovascular:      Rate and Rhythm: Regular rhythm. Tachycardia present.      Pulses: Normal pulses.           Radial  pulses are 2+ on the right side and 2+ on the left side.      Heart sounds: Normal heart sounds.   Pulmonary:      Effort: Pulmonary effort is normal. No respiratory distress.      Breath sounds: Decreased breath sounds present.   Chest:      Chest wall: No mass or deformity.   Abdominal:      General: Abdomen is flat. There is no distension.      Palpations: Abdomen is soft.      Tenderness: There is no abdominal tenderness. There is no rebound.   Musculoskeletal:         General: No swelling or deformity. Normal range of motion.      Cervical back: Normal range of motion. No rigidity.   Skin:     General: Skin is warm.      Capillary Refill: Capillary refill takes less than 2 seconds.      Coloration: Skin is not jaundiced or pale.      Findings: No bruising.   Neurological:      General: No focal deficit present.      Mental Status: He is alert and oriented to person, place, and time.   Psychiatric:         Mood and Affect: Mood normal.         Behavior: Behavior normal.         ED Course & MDM   ED Course as of 12/30/23 2356   Fri Dec 29, 2023   1003 Troponin I, High Sensitivity: 5  Reassuring as pain has been continuous for x2 days [JK]   1007 POCT Lactate, Venous(!): 3.5  IVF ordered [JK]   1048 D-dimer, VTE Exclusion  PE/aortic dissection highly unlikely [JK]      ED Course User Index  [JK] Jose Armando Lee,          Diagnoses as of 12/30/23 2356   Chest pain, unspecified type   Other specified diabetes mellitus with other specified complication, without long-term current use of insulin (CMS/HCC)   Transaminitis   Alcohol abuse   Alcohol withdrawal syndrome, uncomplicated (CMS/HCC)       Medical Decision Making  43-year-old male presenting to the emergency department for evaluation of chest pain.  Everyday smoker, obese, hypertension, nonspecific EKG changes associated with shortness of breath and left arm pain.  Heart score of 4.    EKG sinus tachycardia at a rate of 105 bpm.  Nonspecific T wave  flattening/ST depressions in the inferior leads.  Otherwise no ST elevations new T wave inversions consistent with ACS.    Suspected may be a psychogenic component, possibly exacerbated by alcohol abuse.  Ativan, Zofran ordered.  Patient has diminished lung sounds bilaterally, has a history of tobacco abuse may be developing COPD.    Laboratory evaluation is reassuring.  No evidence of ACS.  Suspect alcohol withdrawal syndrome as a strong component of his presentation today.  Patient may be developing new onset diabetes.  Discussed all labs and imaging at bedside with the patient who agrees with plan for hospitalization.        Procedure  Procedures     Jose Armando Lee,   12/30/23 [id number]

## 2023-12-30 LAB
25(OH)D3 SERPL-MCNC: 21 NG/ML (ref 30–100)
ALBUMIN SERPL BCP-MCNC: 3.4 G/DL (ref 3.4–5)
ALP SERPL-CCNC: 73 U/L (ref 33–120)
ALT SERPL W P-5'-P-CCNC: 160 U/L (ref 10–52)
ANION GAP SERPL CALC-SCNC: 14 MMOL/L (ref 10–20)
AST SERPL W P-5'-P-CCNC: 83 U/L (ref 9–39)
BILIRUB SERPL-MCNC: 0.6 MG/DL (ref 0–1.2)
BUN SERPL-MCNC: 11 MG/DL (ref 6–23)
CALCIUM SERPL-MCNC: 8.6 MG/DL (ref 8.6–10.3)
CHLORIDE SERPL-SCNC: 100 MMOL/L (ref 98–107)
CO2 SERPL-SCNC: 25 MMOL/L (ref 21–32)
CREAT SERPL-MCNC: 0.75 MG/DL (ref 0.5–1.3)
ERYTHROCYTE [DISTWIDTH] IN BLOOD BY AUTOMATED COUNT: 13 % (ref 11.5–14.5)
EST. AVERAGE GLUCOSE BLD GHB EST-MCNC: 120 MG/DL
GFR SERPL CREATININE-BSD FRML MDRD: >90 ML/MIN/1.73M*2
GLUCOSE SERPL-MCNC: 113 MG/DL (ref 74–99)
HBA1C MFR BLD: 5.8 %
HCT VFR BLD AUTO: 41.6 % (ref 41–52)
HGB BLD-MCNC: 13.9 G/DL (ref 13.5–17.5)
MCH RBC QN AUTO: 34.9 PG (ref 26–34)
MCHC RBC AUTO-ENTMCNC: 33.4 G/DL (ref 32–36)
MCV RBC AUTO: 105 FL (ref 80–100)
NRBC BLD-RTO: 0 /100 WBCS (ref 0–0)
PLATELET # BLD AUTO: 170 X10*3/UL (ref 150–450)
POTASSIUM SERPL-SCNC: 3.7 MMOL/L (ref 3.5–5.3)
PROT SERPL-MCNC: 6.5 G/DL (ref 6.4–8.2)
RBC # BLD AUTO: 3.98 X10*6/UL (ref 4.5–5.9)
SODIUM SERPL-SCNC: 135 MMOL/L (ref 136–145)
VIT B12 SERPL-MCNC: 423 PG/ML (ref 211–911)
WBC # BLD AUTO: 6.3 X10*3/UL (ref 4.4–11.3)

## 2023-12-30 PROCEDURE — 2500000004 HC RX 250 GENERAL PHARMACY W/ HCPCS (ALT 636 FOR OP/ED): Performed by: NURSE PRACTITIONER

## 2023-12-30 PROCEDURE — 2500000001 HC RX 250 WO HCPCS SELF ADMINISTERED DRUGS (ALT 637 FOR MEDICARE OP): Performed by: NURSE PRACTITIONER

## 2023-12-30 PROCEDURE — S4991 NICOTINE PATCH NONLEGEND: HCPCS | Performed by: NURSE PRACTITIONER

## 2023-12-30 PROCEDURE — 85027 COMPLETE CBC AUTOMATED: CPT | Performed by: NURSE PRACTITIONER

## 2023-12-30 PROCEDURE — 2500000002 HC RX 250 W HCPCS SELF ADMINISTERED DRUGS (ALT 637 FOR MEDICARE OP, ALT 636 FOR OP/ED): Performed by: NURSE PRACTITIONER

## 2023-12-30 PROCEDURE — 84075 ASSAY ALKALINE PHOSPHATASE: CPT | Performed by: NURSE PRACTITIONER

## 2023-12-30 PROCEDURE — 36415 COLL VENOUS BLD VENIPUNCTURE: CPT | Performed by: NURSE PRACTITIONER

## 2023-12-30 PROCEDURE — 82607 VITAMIN B-12: CPT | Mod: STJLAB | Performed by: NURSE PRACTITIONER

## 2023-12-30 PROCEDURE — 96372 THER/PROPH/DIAG INJ SC/IM: CPT | Performed by: NURSE PRACTITIONER

## 2023-12-30 PROCEDURE — 1200000002 HC GENERAL ROOM WITH TELEMETRY DAILY

## 2023-12-30 PROCEDURE — 90792 PSYCH DIAG EVAL W/MED SRVCS: CPT | Performed by: PSYCHIATRY & NEUROLOGY

## 2023-12-30 RX ORDER — BUPRENORPHINE HYDROCHLORIDE AND NALOXONE HYDROCHLORIDE DIHYDRATE 8; 2 MG/1; MG/1
2 TABLET SUBLINGUAL DAILY
Status: DISCONTINUED | OUTPATIENT
Start: 2023-12-30 | End: 2024-01-07 | Stop reason: HOSPADM

## 2023-12-30 RX ADMIN — LORAZEPAM 1 MG: 2 INJECTION, SOLUTION INTRAMUSCULAR; INTRAVENOUS at 10:17

## 2023-12-30 RX ADMIN — GABAPENTIN 600 MG: 300 CAPSULE ORAL at 14:04

## 2023-12-30 RX ADMIN — DIAZEPAM 5 MG: 5 TABLET ORAL at 12:14

## 2023-12-30 RX ADMIN — GABAPENTIN 600 MG: 300 CAPSULE ORAL at 08:41

## 2023-12-30 RX ADMIN — LORAZEPAM 0.5 MG: 2 INJECTION, SOLUTION INTRAMUSCULAR; INTRAVENOUS at 08:24

## 2023-12-30 RX ADMIN — Medication 1 TABLET: at 08:41

## 2023-12-30 RX ADMIN — LORAZEPAM 2 MG: 2 INJECTION, SOLUTION INTRAMUSCULAR; INTRAVENOUS at 16:06

## 2023-12-30 RX ADMIN — THIAMINE HCL TAB 100 MG 100 MG: 100 TAB at 08:41

## 2023-12-30 RX ADMIN — AMLODIPINE BESYLATE 10 MG: 10 TABLET ORAL at 08:41

## 2023-12-30 RX ADMIN — PANTOPRAZOLE SODIUM 40 MG: 40 TABLET, DELAYED RELEASE ORAL at 06:45

## 2023-12-30 RX ADMIN — FOLIC ACID 1 MG: 1 TABLET ORAL at 08:41

## 2023-12-30 RX ADMIN — LORAZEPAM 2 MG: 2 INJECTION, SOLUTION INTRAMUSCULAR; INTRAVENOUS at 22:19

## 2023-12-30 RX ADMIN — GABAPENTIN 600 MG: 300 CAPSULE ORAL at 20:02

## 2023-12-30 RX ADMIN — LORAZEPAM 1 MG: 2 INJECTION, SOLUTION INTRAMUSCULAR; INTRAVENOUS at 20:03

## 2023-12-30 RX ADMIN — DIAZEPAM 5 MG: 5 TABLET ORAL at 05:54

## 2023-12-30 RX ADMIN — VENLAFAXINE HYDROCHLORIDE 150 MG: 75 CAPSULE, EXTENDED RELEASE ORAL at 08:41

## 2023-12-30 RX ADMIN — LORAZEPAM 2 MG: 2 INJECTION, SOLUTION INTRAMUSCULAR; INTRAVENOUS at 14:04

## 2023-12-30 RX ADMIN — ENOXAPARIN SODIUM 40 MG: 40 INJECTION SUBCUTANEOUS at 12:14

## 2023-12-30 RX ADMIN — DIAZEPAM 5 MG: 5 TABLET ORAL at 00:09

## 2023-12-30 RX ADMIN — DIAZEPAM 5 MG: 5 TABLET ORAL at 18:09

## 2023-12-30 RX ADMIN — ONDANSETRON 4 MG: 2 INJECTION INTRAMUSCULAR; INTRAVENOUS at 22:19

## 2023-12-30 RX ADMIN — NICOTINE 1 PATCH: 14 PATCH, EXTENDED RELEASE TRANSDERMAL at 08:44

## 2023-12-30 RX ADMIN — BUPRENORPHINE AND NALOXONE 2 TABLET: 8; 2 TABLET SUBLINGUAL at 11:33

## 2023-12-30 RX ADMIN — LOSARTAN POTASSIUM: 100 TABLET, FILM COATED ORAL at 08:41

## 2023-12-30 ASSESSMENT — LIFESTYLE VARIABLES
HEADACHE, FULLNESS IN HEAD: VERY MILD
PAROXYSMAL SWEATS: BARELY PERCEPTIBLE SWEATING, PALMS MOIST
AGITATION: SOMEWHAT MORE THAN NORMAL ACTIVITY
ORIENTATION AND CLOUDING OF SENSORIUM: ORIENTED AND CAN DO SERIAL ADDITIONS
TOTAL SCORE: 2
PAROXYSMAL SWEATS: BARELY PERCEPTIBLE SWEATING, PALMS MOIST
VISUAL DISTURBANCES: NOT PRESENT
NAUSEA AND VOMITING: NO NAUSEA AND NO VOMITING
AUDITORY DISTURBANCES: NOT PRESENT
ANXIETY: MILDLY ANXIOUS
TREMOR: NOT VISIBLE, BUT CAN BE FELT FINGERTIP TO FINGERTIP
VISUAL DISTURBANCES: NOT PRESENT
TOTAL SCORE: 10
ORIENTATION AND CLOUDING OF SENSORIUM: ORIENTED AND CAN DO SERIAL ADDITIONS
ORIENTATION AND CLOUDING OF SENSORIUM: ORIENTED AND CAN DO SERIAL ADDITIONS
PAROXYSMAL SWEATS: BARELY PERCEPTIBLE SWEATING, PALMS MOIST
TOTAL SCORE: 2
TOTAL SCORE: 8
TOTAL SCORE: 9
PAROXYSMAL SWEATS: BARELY PERCEPTIBLE SWEATING, PALMS MOIST
NAUSEA AND VOMITING: MILD NAUSEA WITH NO VOMITING
TREMOR: 2
ORIENTATION AND CLOUDING OF SENSORIUM: ORIENTED AND CAN DO SERIAL ADDITIONS
ANXIETY: NO ANXIETY, AT EASE
NAUSEA AND VOMITING: NO NAUSEA AND NO VOMITING
HEADACHE, FULLNESS IN HEAD: NOT PRESENT
VISUAL DISTURBANCES: NOT PRESENT
NAUSEA AND VOMITING: NO NAUSEA AND NO VOMITING
HEADACHE, FULLNESS IN HEAD: VERY MILD
PULSE: 91
HEADACHE, FULLNESS IN HEAD: VERY MILD
PAROXYSMAL SWEATS: BEADS OF SWEAT OBVIOUS ON FOREHEAD
AGITATION: NORMAL ACTIVITY
TREMOR: NOT VISIBLE, BUT CAN BE FELT FINGERTIP TO FINGERTIP
VISUAL DISTURBANCES: NOT PRESENT
ORIENTATION AND CLOUDING OF SENSORIUM: ORIENTED AND CAN DO SERIAL ADDITIONS
AUDITORY DISTURBANCES: NOT PRESENT
AUDITORY DISTURBANCES: NOT PRESENT
AGITATION: NORMAL ACTIVITY
NAUSEA AND VOMITING: MILD NAUSEA WITH NO VOMITING
PAROXYSMAL SWEATS: BARELY PERCEPTIBLE SWEATING, PALMS MOIST
AUDITORY DISTURBANCES: NOT PRESENT
ORIENTATION AND CLOUDING OF SENSORIUM: ORIENTED AND CAN DO SERIAL ADDITIONS
ANXIETY: MILDLY ANXIOUS
ANXIETY: MILDLY ANXIOUS
TREMOR: 2
TOTAL SCORE: 4
TOTAL SCORE: 8
NAUSEA AND VOMITING: NO NAUSEA AND NO VOMITING
HEADACHE, FULLNESS IN HEAD: NOT PRESENT
ORIENTATION AND CLOUDING OF SENSORIUM: ORIENTED AND CAN DO SERIAL ADDITIONS
VISUAL DISTURBANCES: NOT PRESENT
TOTAL SCORE: 4
TOTAL SCORE: 9
TREMOR: NOT VISIBLE, BUT CAN BE FELT FINGERTIP TO FINGERTIP
AUDITORY DISTURBANCES: NOT PRESENT
TREMOR: NOT VISIBLE, BUT CAN BE FELT FINGERTIP TO FINGERTIP
TREMOR: NOT VISIBLE, BUT CAN BE FELT FINGERTIP TO FINGERTIP
TOTAL SCORE: 2
VISUAL DISTURBANCES: NOT PRESENT
HEADACHE, FULLNESS IN HEAD: NOT PRESENT
AUDITORY DISTURBANCES: NOT PRESENT
ANXIETY: NO ANXIETY, AT EASE
PAROXYSMAL SWEATS: BEADS OF SWEAT OBVIOUS ON FOREHEAD
VISUAL DISTURBANCES: NOT PRESENT
ORIENTATION AND CLOUDING OF SENSORIUM: ORIENTED AND CAN DO SERIAL ADDITIONS
AGITATION: NORMAL ACTIVITY
AGITATION: NORMAL ACTIVITY
NAUSEA AND VOMITING: NO NAUSEA AND NO VOMITING
AUDITORY DISTURBANCES: NOT PRESENT
BLOOD PRESSURE: 163/93
AGITATION: NORMAL ACTIVITY
AUDITORY DISTURBANCES: NOT PRESENT
VISUAL DISTURBANCES: NOT PRESENT
ORIENTATION AND CLOUDING OF SENSORIUM: ORIENTED AND CAN DO SERIAL ADDITIONS
NAUSEA AND VOMITING: MILD NAUSEA WITH NO VOMITING
TREMOR: NOT VISIBLE, BUT CAN BE FELT FINGERTIP TO FINGERTIP
AUDITORY DISTURBANCES: NOT PRESENT
HEADACHE, FULLNESS IN HEAD: VERY MILD
HEADACHE, FULLNESS IN HEAD: MILD
AGITATION: NORMAL ACTIVITY
PAROXYSMAL SWEATS: 3
VISUAL DISTURBANCES: NOT PRESENT
AGITATION: NORMAL ACTIVITY
AGITATION: NORMAL ACTIVITY
BLOOD PRESSURE: 148/77
AGITATION: NORMAL ACTIVITY
TREMOR: NOT VISIBLE, BUT CAN BE FELT FINGERTIP TO FINGERTIP
TREMOR: NOT VISIBLE, BUT CAN BE FELT FINGERTIP TO FINGERTIP
HEADACHE, FULLNESS IN HEAD: MODERATE
ANXIETY: 3
AUDITORY DISTURBANCES: NOT PRESENT
ANXIETY: MILDLY ANXIOUS
NAUSEA AND VOMITING: NO NAUSEA AND NO VOMITING
TOTAL SCORE: 7
ANXIETY: MILDLY ANXIOUS
PAROXYSMAL SWEATS: 3
HEADACHE, FULLNESS IN HEAD: VERY MILD
AGITATION: SOMEWHAT MORE THAN NORMAL ACTIVITY
PAROXYSMAL SWEATS: BARELY PERCEPTIBLE SWEATING, PALMS MOIST
NAUSEA AND VOMITING: NO NAUSEA AND NO VOMITING
PAROXYSMAL SWEATS: BARELY PERCEPTIBLE SWEATING, PALMS MOIST
HEADACHE, FULLNESS IN HEAD: VERY MILD
ORIENTATION AND CLOUDING OF SENSORIUM: ORIENTED AND CAN DO SERIAL ADDITIONS
VISUAL DISTURBANCES: NOT PRESENT
VISUAL DISTURBANCES: NOT PRESENT
TREMOR: 2
ANXIETY: 2
ANXIETY: NO ANXIETY, AT EASE
PULSE: 78
ORIENTATION AND CLOUDING OF SENSORIUM: ORIENTED AND CAN DO SERIAL ADDITIONS
NAUSEA AND VOMITING: 3
ANXIETY: MILDLY ANXIOUS
AUDITORY DISTURBANCES: NOT PRESENT

## 2023-12-30 ASSESSMENT — COGNITIVE AND FUNCTIONAL STATUS - GENERAL
MOBILITY SCORE: 24
DAILY ACTIVITIY SCORE: 24

## 2023-12-30 ASSESSMENT — PAIN SCALES - GENERAL
PAINLEVEL_OUTOF10: 0 - NO PAIN
PAINLEVEL_OUTOF10: 2
PAINLEVEL_OUTOF10: 0 - NO PAIN

## 2023-12-30 ASSESSMENT — PAIN - FUNCTIONAL ASSESSMENT: PAIN_FUNCTIONAL_ASSESSMENT: 0-10

## 2023-12-30 NOTE — CONSULTS
Reason for consult: Alcoholism depression    History Of Present Illness  Deyvi Araujo is a 43 y.o. male presenting with alcohol withdrawal symptoms.  Patient is   lives alone with shared custody of 11-year-old son employed at a construction company.    Patient reports that over the last 3 months he has been drinking around 24 shots of liquor per day.  Patient is currently going through withdrawal.  Patient reported that he drinks 2 numbers feeling and to go to sleep.  Patient rated his depression to be 10 out of 10 with 10 being bad.  She denies having any active suicidal thoughts but in the past he has had some passive death wishes.  Patient reported that he was never tried to commit suicide due to his child.  He wants to see his son growing.  Patient denies any audiovisual hallucinations or paranoid thoughts.  Patient reports that he is feeling somewhat tired and groggy with the medication and would like to talk about his mental health symptoms in detail at a later time.    Patient reported that he has been taking Effexor XR Neurontin Atarax and trazodone prescribed by his psychiatrist at [facility].    Patient was admitted to psychiatric unit 1 time many years ago for depression.  He has never attempted suicide in the past.  Patient denies having any access to guns or weapons.    Patient denies abusing any drugs     Past Medical History  He has a past medical history of Alcohol abuse, Anxiety and depression, GERD (gastroesophageal reflux disease), HLD (hyperlipidemia), HTN (hypertension), Obesity (BMI 30.0-34.9), Polysubstance abuse (CMS/HCC), and PTSD (post-traumatic stress disorder).    Surgical History  He has no past surgical history on file.     Social History  He reports that he has been smoking cigarettes. He has been smoking an average of .5 packs per day. He has never used smokeless tobacco. He reports that he does not currently use drugs. No history on file for alcohol use.    "  Allergies  Patient has no known allergies.        Mental Status Exam  General: Alert and oriented x 3  Appearance: Appears stated age  Attitude: Cooperative  Behavior: Normal  Motor Activity: Normal psychomotor activity  Speech: Low in volume and rate coherent  Mood: Depressed and anxious  Affect: Restricted  Thought Process: Normal  Thought Content: Denies hopeless or worthless feeling denies suicidal thoughts  Thought Perception: No audiovisual hallucinations  Cognition: Intact  Insight: Fair  Judgement: Fair    Psychiatric Risk Assessment  High risk of relapse with alcohol    Last Recorded Vitals  Blood pressure (!) 147/94, pulse 80, temperature 36 °C (96.8 °F), temperature source Temporal, resp. rate 18, height 1.88 m (6' 2\"), weight 132 kg (291 lb 7.2 oz), SpO2 95 %.    Relevant Results  Results for orders placed or performed during the hospital encounter of 12/29/23 (from the past 96 hour(s))   CBC with Differential   Result Value Ref Range    WBC 8.9 4.4 - 11.3 x10*3/uL    nRBC 0.0 0.0 - 0.0 /100 WBCs    RBC 4.69 4.50 - 5.90 x10*6/uL    Hemoglobin 16.6 13.5 - 17.5 g/dL    Hematocrit 48.0 41.0 - 52.0 %     (H) 80 - 100 fL    MCH 35.4 (H) 26.0 - 34.0 pg    MCHC 34.6 32.0 - 36.0 g/dL    RDW 13.2 11.5 - 14.5 %    Platelets 212 150 - 450 x10*3/uL    Neutrophils % 67.4 40.0 - 80.0 %    Immature Granulocytes %, Automated 0.2 0.0 - 0.9 %    Lymphocytes % 23.2 13.0 - 44.0 %    Monocytes % 7.5 2.0 - 10.0 %    Eosinophils % 0.9 0.0 - 6.0 %    Basophils % 0.8 0.0 - 2.0 %    Neutrophils Absolute 6.00 1.20 - 7.70 x10*3/uL    Immature Granulocytes Absolute, Automated 0.02 0.00 - 0.70 x10*3/uL    Lymphocytes Absolute 2.07 1.20 - 4.80 x10*3/uL    Monocytes Absolute 0.67 0.10 - 1.00 x10*3/uL    Eosinophils Absolute 0.08 0.00 - 0.70 x10*3/uL    Basophils Absolute 0.07 0.00 - 0.10 x10*3/uL   Comprehensive Metabolic Panel   Result Value Ref Range    Glucose 277 (H) 74 - 99 mg/dL    Sodium 131 (L) 136 - 145 mmol/L    " Potassium 3.6 3.5 - 5.3 mmol/L    Chloride 96 (L) 98 - 107 mmol/L    Bicarbonate 22 21 - 32 mmol/L    Anion Gap 17 10 - 20 mmol/L    Urea Nitrogen 10 6 - 23 mg/dL    Creatinine 0.84 0.50 - 1.30 mg/dL    eGFR >90 >60 mL/min/1.73m*2    Calcium 9.4 8.6 - 10.3 mg/dL    Albumin 4.3 3.4 - 5.0 g/dL    Alkaline Phosphatase 96 33 - 120 U/L    Total Protein 7.5 6.4 - 8.2 g/dL     (H) 9 - 39 U/L    Bilirubin, Total 0.7 0.0 - 1.2 mg/dL     (H) 10 - 52 U/L   Brain Natriuretic Peptide   Result Value Ref Range    BNP 6 0 - 99 pg/mL   Protime-INR   Result Value Ref Range    Protime 11.8 9.8 - 12.8 seconds    INR 1.0 0.9 - 1.1   Troponin I, High Sensitivity   Result Value Ref Range    Troponin I, High Sensitivity 5 0 - 20 ng/L   D-dimer, VTE Exclusion   Result Value Ref Range    D-Dimer, Quantitative VTE Exclusion 299 <=500 ng/mL FEU   Acute Toxicology Panel, Blood   Result Value Ref Range    Acetaminophen <10.0 10.0 - 30.0 ug/mL    Salicylate  <3 4 - 20 mg/dL    Alcohol 61 (H) <=10 mg/dL   Magnesium   Result Value Ref Range    Magnesium 2.23 1.60 - 2.40 mg/dL   TSH with reflex to Free T4 if abnormal   Result Value Ref Range    Thyroid Stimulating Hormone 1.90 0.44 - 3.98 mIU/L   Hemoglobin A1C   Result Value Ref Range    Hemoglobin A1C 5.8 (H) see below %    Estimated Average Glucose 120 Not Established mg/dL   ECG 12 lead   Result Value Ref Range    Ventricular Rate 105 BPM    Atrial Rate 105 BPM    DC Interval 146 ms    QRS Duration 86 ms    QT Interval 348 ms    QTC Calculation(Bazett) 459 ms    P Axis 43 degrees    R Axis 31 degrees    T Axis -13 degrees    QRS Count 17 beats    Q Onset 222 ms    P Onset 149 ms    P Offset 205 ms    T Offset 396 ms    QTC Fredericia 419 ms   BLOOD GAS VENOUS FULL PANEL   Result Value Ref Range    POCT pH, Venous 7.40 7.33 - 7.43 pH    POCT pCO2, Venous 39 (L) 41 - 51 mm Hg    POCT pO2, Venous 67 (H) 35 - 45 mm Hg    POCT SO2, Venous 94 (H) 45 - 75 %    POCT Oxy Hemoglobin, Venous  88.9 (H) 45.0 - 75.0 %    POCT Hematocrit Calculated, Venous 51.0 41.0 - 52.0 %    POCT Sodium, Venous 131 (L) 136 - 145 mmol/L    POCT Potassium, Venous 3.7 3.5 - 5.3 mmol/L    POCT Chloride, Venous 101 98 - 107 mmol/L    POCT Ionized Calicum, Venous 1.22 1.10 - 1.33 mmol/L    POCT Glucose, Venous 237 (H) 74 - 99 mg/dL    POCT Lactate, Venous 3.5 (H) 0.4 - 2.0 mmol/L    POCT Base Excess, Venous -0.5 -2.0 - 3.0 mmol/L    POCT HCO3 Calculated, Venous 24.2 22.0 - 26.0 mmol/L    POCT Hemoglobin, Venous 17.0 13.5 - 17.5 g/dL    POCT Anion Gap, Venous 10.0 10.0 - 25.0 mmol/L    Patient Temperature      FiO2 21 %   Sars-CoV-2 and Influenza A/B PCR   Result Value Ref Range    Flu A Result Not Detected Not Detected    Flu B Result Not Detected Not Detected    Coronavirus 2019, PCR Not Detected Not Detected   Blood Gas Lactic Acid, Venous   Result Value Ref Range    POCT Lactate, Venous 2.0 0.4 - 2.0 mmol/L   Drug Screen, Urine   Result Value Ref Range    Amphetamine Screen, Urine Presumptive Negative Presumptive Negative    Barbiturate Screen, Urine Presumptive Negative Presumptive Negative    Benzodiazepines Screen, Urine Presumptive Negative Presumptive Negative    Cannabinoid Screen, Urine Presumptive Negative Presumptive Negative    Cocaine Metabolite Screen, Urine Presumptive Negative Presumptive Negative    Fentanyl Screen, Urine Presumptive Negative Presumptive Negative    Opiate Screen, Urine Presumptive Negative Presumptive Negative    Oxycodone Screen, Urine Presumptive Negative Presumptive Negative    PCP Screen, Urine Presumptive Negative Presumptive Negative   Vitamin D 25-Hydroxy,Total (for eval of Vitamin D levels)   Result Value Ref Range    Vitamin D, 25-Hydroxy, Total 21 (L) 30 - 100 ng/mL   CBC   Result Value Ref Range    WBC 6.3 4.4 - 11.3 x10*3/uL    nRBC 0.0 0.0 - 0.0 /100 WBCs    RBC 3.98 (L) 4.50 - 5.90 x10*6/uL    Hemoglobin 13.9 13.5 - 17.5 g/dL    Hematocrit 41.6 41.0 - 52.0 %     (H) 80 -  100 fL    MCH 34.9 (H) 26.0 - 34.0 pg    MCHC 33.4 32.0 - 36.0 g/dL    RDW 13.0 11.5 - 14.5 %    Platelets 170 150 - 450 x10*3/uL   Vitamin B12   Result Value Ref Range    Vitamin B12 423 211 - 911 pg/mL   Comprehensive metabolic panel   Result Value Ref Range    Glucose 113 (H) 74 - 99 mg/dL    Sodium 135 (L) 136 - 145 mmol/L    Potassium 3.7 3.5 - 5.3 mmol/L    Chloride 100 98 - 107 mmol/L    Bicarbonate 25 21 - 32 mmol/L    Anion Gap 14 10 - 20 mmol/L    Urea Nitrogen 11 6 - 23 mg/dL    Creatinine 0.75 0.50 - 1.30 mg/dL    eGFR >90 >60 mL/min/1.73m*2    Calcium 8.6 8.6 - 10.3 mg/dL    Albumin 3.4 3.4 - 5.0 g/dL    Alkaline Phosphatase 73 33 - 120 U/L    Total Protein 6.5 6.4 - 8.2 g/dL    AST 83 (H) 9 - 39 U/L    Bilirubin, Total 0.6 0.0 - 1.2 mg/dL     (H) 10 - 52 U/L     ECG 12 lead    Result Date: 12/29/2023  Sinus tachycardia Possible Left atrial enlargement T wave abnormality, consider inferior ischemia Abnormal ECG When compared with ECG of 29-DEC-2023 09:16, (unconfirmed) No significant change was found    XR lumbar spine 2-3 views    Result Date: 12/29/2023  Interpreted By:  Jerod Echavarria, STUDY: XR LUMBAR SPINE 2-3 VIEWS 12/29/2023 1:18 pm   INDICATION: Signs/Symptoms:back pain   COMPARISON: None.   ACCESSION NUMBER(S): [id number]   ORDERING CLINICIAN: ANT COSTA   TECHNIQUE: 3 views of the lumbar spine including AP, lateral and lateral cone-down views were obtained.   FINDINGS: There is no acute fracture identified. The vertebral bodies are well aligned without evidence of subluxation. Mild disc space narrowing and tiny marginal osteophytes are present at the L3-4 level. Mild facet degenerative changes are seen throughout the lumbar spine.       1. No evidence of acute fracture. 2. Degenerative changes throughout the lumbar spine, as described above.   MACRO: None.   Signed by: Jerod Echavarria 12/29/2023 1:21 PM Dictation workstation:   [id number]    XR chest 1 view    Result Date:  12/29/2023  Interpreted By:  Yosef Shukla, STUDY: XR CHEST 1 VIEW;  12/29/2023 10:20 am   INDICATION: Signs/Symptoms:Chest Pain.   COMPARISON: March 14, 2023 CT abdomen and pelvis and chest radiograph   ACCESSION NUMBER(S): [id number]   ORDERING CLINICIAN: FANY GODINEZ   FINDINGS: AP radiograph of the chest was provided.   Overlapping radiopaque leads   CARDIOMEDIASTINAL SILHOUETTE: Cardiomediastinal silhouette is normal in size and configuration.   LUNGS: Lungs are clear.   ABDOMEN: No remarkable upper abdominal findings.   BONES: No acute osseous changes.       1.  No evidence of acute cardiopulmonary process.       MACRO: None   Signed by: Yosef Shukla 12/29/2023 10:49 AM Dictation workstation:   [id number]           Assessment/Plan   Alcohol dependence severe  Substance-induced mood disorder  Major depressive disorder recurrent    Continue with the CIWA protocol  Thiamine and folate supplement  Agree with restarting Effexor  mg.  This dose might need adjusted which I will continue to monitor and change depending on his mental status  Will continue to follow him during the stay in the hospital and make further recommendation as needed           Medication Consent  Medication Consent: risks, benefits, side effects reviewed for all ordered meds    Brown Babb MD

## 2023-12-30 NOTE — CARE PLAN
The patient's goals for the shift include      The clinical goals for the shift include Withdrawal        Problem: Fall/Injury  Goal: Not fall by end of shift  Outcome: Not Progressing  Goal: Be free from injury by end of the shift  Outcome: Not Progressing  Goal: Verbalize understanding of personal risk factors for fall in the hospital  Outcome: Not Progressing  Goal: Verbalize understanding of risk factor reduction measures to prevent injury from fall in the home  Outcome: Not Progressing  Goal: Use assistive devices by end of the shift  Outcome: Not Progressing  Goal: Pace activities to prevent fatigue by end of the shift  Outcome: Not Progressing     Problem: Diabetes  Goal: Achieve decreasing blood glucose levels by end of shift  Outcome: Not Progressing  Goal: Increase stability of blood glucose readings by end of shift  Outcome: Not Progressing  Goal: Decrease in ketones present in urine by end of shift  Outcome: Not Progressing  Goal: Maintain electrolyte levels within acceptable range throughout shift  Outcome: Not Progressing  Goal: Maintain glucose levels >70mg/dl to <250mg/dl throughout shift  Outcome: Not Progressing  Goal: No changes in neurological exam by end of shift  Outcome: Not Progressing  Goal: Learn about and adhere to nutrition recommendations by end of shift  Outcome: Not Progressing  Goal: Vital signs within normal range for age by end of shift  Outcome: Not Progressing  Goal: Increase self care and/or family involovement by end of shift  Outcome: Not Progressing  Goal: Receive DSME education by end of shift  Outcome: Not Progressing

## 2023-12-30 NOTE — H&P
History Of Present Illness  Deyvi Araujo is a 43 y.o. male presenting with 43-year-old patient has a history of PTSD and alcohol abuse as well as polysubstance abuse.  Patient comes in with anxiety chest pain nausea and vomiting.  He states that he is now having issues with alcohol.  He drinks 10-10 small bottles of fireball's a day.  He is tried trazodone for insomnia.  Otherwise he has been having some numbness and tingling in all of his extremities..     Past Medical History  Past Medical History:   Diagnosis Date    Alcohol abuse     Anxiety and depression     GERD (gastroesophageal reflux disease)     HLD (hyperlipidemia)     HTN (hypertension)     Obesity (BMI 30.0-34.9)     Polysubstance abuse (CMS/[facility])     PTSD (post-traumatic stress disorder)        Surgical History  No past surgical history on file.     Social History  He reports that he has been smoking cigarettes. He has been smoking an average of .5 packs per day. He has never used smokeless tobacco. He reports that he does not currently use drugs. No history on file for alcohol use.    Family History  Family History   Problem Relation Name Age of Onset    Alcohol abuse Father          Allergies  Patient has no known allergies.    Review of Systems   Constitutional:  Positive for unexpected weight change.   Respiratory:  Positive for chest tightness and shortness of breath.    Cardiovascular:  Negative for palpitations and leg swelling.   Gastrointestinal:  Positive for nausea and vomiting. Negative for abdominal pain.   Genitourinary:  Negative for frequency and hematuria.   Skin: Negative.    Neurological:  Positive for numbness and headaches. Negative for seizures and syncope.   Psychiatric/Behavioral:  Positive for sleep disturbance. The patient is nervous/anxious.    Physical Exam   Eyes:      Extraocular Movements: Extraocular movements intact.      Pupils: Pupils are equal, round, and reactive to light.   Cardiovascular:      Rate and Rhythm:  "Normal rate and regular rhythm.      Pulses: Normal pulses.      Heart sounds: Normal heart sounds.   Pulmonary:      Effort: Pulmonary effort is normal.      Breath sounds: Normal breath sounds.   Abdominal:      General: Abdomen is flat. Bowel sounds are normal.      Palpations: Abdomen is soft.   Musculoskeletal:         General: Normal range of motion.   Skin:     General: Skin is warm and dry.      Capillary Refill: Capillary refill takes less than 2 seconds.   Neurological:      General: No focal deficit present.      Mental Status: He is alert and oriented to person, place, and time.   Psychiatric:         Behavior: Behavior normal.         Thought Content: Thought content normal  Last Recorded Vitals  Blood pressure (!) 139/101, pulse 80, temperature 36 °C (96.8 °F), temperature source Temporal, resp. rate 16, height 1.88 m (6' 2\"), weight 132 kg (291 lb 7.2 oz), SpO2 95 %.    Relevant Results        Scheduled medications  amLODIPine, 10 mg, oral, Daily  buprenorphine-naloxone, 1 tablet, sublingual, q12h  diazePAM, 5 mg, oral, q6h HORTENSIA  enoxaparin, 40 mg, subcutaneous, q24h  folic acid, 1 mg, oral, Daily  gabapentin, 600 mg, oral, TID  losartan 100 mg, hydroCHLOROthiazide 12.5 mg for Hyzaar 100/12.5, , oral, Daily  multivitamin with minerals, 1 tablet, oral, Daily  nicotine, 1 patch, transdermal, Daily   Followed by  [START ON 2/9/2024] nicotine, 1 patch, transdermal, Daily  pantoprazole, 40 mg, oral, Daily before breakfast  QUEtiapine, 25 mg, oral, Nightly  thiamine, 100 mg, oral, Daily  venlafaxine XR, 150 mg, oral, Daily      Continuous medications     PRN medications  PRN medications: LORazepam **OR** LORazepam **OR** LORazepam, ondansetron, polyethylene glycol       Assessment/Plan   Principal Problem:    Alcohol withdrawal syndrome, uncomplicated (CMS/HCC)  Active Problems:    Anxiety and depression    Elevated LFTs    Hyperglycemia    Alcohol-induced insomnia (CMS/HCC)    Neuropathy      CIWA " protocol  Electrolyte repletion  IV fluids  Monitor LFTs  DVT prophylaxis  Continue current care       I spent 39 minutes in the professional and overall care of this patient.      David Chadwick MD

## 2023-12-30 NOTE — CARE PLAN
The clinical goals for the shift include Pts withdrawl symptoms will remain tolerable with medication assistance throughout shift 12/30 1700    Pt was medicated appropriately.  Poor appetite throughout shift.  Consulted by psych earlier in shift with no medication changes at this time.

## 2023-12-30 NOTE — PROGRESS NOTES
Spiritual Care Visit    Clinical Encounter Type  Visited With: Patient  Routine Visit: Introduction          Patient communicated he is on day 2/3 of detoxing from alcohol.  Stated his friend brought him to the ER for intoxication.  Stated he is sick, physically, and sick mentally - sick and tired of the drinking.  Stated he has done programs in the past, AA does not work for him for reasons that go into his childhood trauma, going through a divorce and employment issues.  We talked briefly about his trauma and the long lasting affects into adulthood.  Patient was very open, however, was unable to contribute details as he stated his mind was not where it needed to be.  I was grateful for the patients acceptance to me and the ability to talk candidly about personal issues.  I encouraged him to find a trauma counseling and begin treatment for the trauma.  He agreed.                                  Taxonomy  Intended Effects: Build relationship of care and support, Convey a calming presence, Demonstrate caring and concern, Lessen someone's feelings of isolation, Lessen anxiety, Journeying with someone in the grief process, Helping someone feel comforted, Preserve dignity and respect, Promote sense of peace

## 2023-12-30 NOTE — NURSING NOTE
Patient alert and oriented x 3.  On CIWA Protocol .  Patient was medicated with ativan 2 mg iv at 2050.  Then 1 mg iv at 2321.  Patient also medicated with schedued valium 5 mg po. Patient slept rest of night.  Bed alarm on and safety maintained.

## 2023-12-31 LAB
ALBUMIN SERPL BCP-MCNC: 3.9 G/DL (ref 3.4–5)
ALP SERPL-CCNC: 89 U/L (ref 33–120)
ALT SERPL W P-5'-P-CCNC: 157 U/L (ref 10–52)
ANION GAP SERPL CALC-SCNC: 12 MMOL/L (ref 10–20)
AST SERPL W P-5'-P-CCNC: 89 U/L (ref 9–39)
BILIRUB SERPL-MCNC: 0.9 MG/DL (ref 0–1.2)
BUN SERPL-MCNC: 11 MG/DL (ref 6–23)
CALCIUM SERPL-MCNC: 9.3 MG/DL (ref 8.6–10.3)
CHLORIDE SERPL-SCNC: 98 MMOL/L (ref 98–107)
CO2 SERPL-SCNC: 27 MMOL/L (ref 21–32)
CREAT SERPL-MCNC: 0.71 MG/DL (ref 0.5–1.3)
ERYTHROCYTE [DISTWIDTH] IN BLOOD BY AUTOMATED COUNT: 12.6 % (ref 11.5–14.5)
GFR SERPL CREATININE-BSD FRML MDRD: >90 ML/MIN/1.73M*2
GLUCOSE SERPL-MCNC: 104 MG/DL (ref 74–99)
HCT VFR BLD AUTO: 44.5 % (ref 41–52)
HGB BLD-MCNC: 15.4 G/DL (ref 13.5–17.5)
MCH RBC QN AUTO: 35.2 PG (ref 26–34)
MCHC RBC AUTO-ENTMCNC: 34.6 G/DL (ref 32–36)
MCV RBC AUTO: 102 FL (ref 80–100)
NRBC BLD-RTO: 0 /100 WBCS (ref 0–0)
PLATELET # BLD AUTO: 208 X10*3/UL (ref 150–450)
POTASSIUM SERPL-SCNC: 3.5 MMOL/L (ref 3.5–5.3)
PROT SERPL-MCNC: 7.4 G/DL (ref 6.4–8.2)
RBC # BLD AUTO: 4.37 X10*6/UL (ref 4.5–5.9)
SODIUM SERPL-SCNC: 133 MMOL/L (ref 136–145)
WBC # BLD AUTO: 6.3 X10*3/UL (ref 4.4–11.3)

## 2023-12-31 PROCEDURE — 80053 COMPREHEN METABOLIC PANEL: CPT | Performed by: NURSE PRACTITIONER

## 2023-12-31 PROCEDURE — S4991 NICOTINE PATCH NONLEGEND: HCPCS | Performed by: NURSE PRACTITIONER

## 2023-12-31 PROCEDURE — 96372 THER/PROPH/DIAG INJ SC/IM: CPT | Performed by: NURSE PRACTITIONER

## 2023-12-31 PROCEDURE — 2500000002 HC RX 250 W HCPCS SELF ADMINISTERED DRUGS (ALT 637 FOR MEDICARE OP, ALT 636 FOR OP/ED): Performed by: NURSE PRACTITIONER

## 2023-12-31 PROCEDURE — 1200000002 HC GENERAL ROOM WITH TELEMETRY DAILY

## 2023-12-31 PROCEDURE — 85027 COMPLETE CBC AUTOMATED: CPT | Performed by: NURSE PRACTITIONER

## 2023-12-31 PROCEDURE — 2500000001 HC RX 250 WO HCPCS SELF ADMINISTERED DRUGS (ALT 637 FOR MEDICARE OP): Performed by: NURSE PRACTITIONER

## 2023-12-31 PROCEDURE — 99232 SBSQ HOSP IP/OBS MODERATE 35: CPT | Performed by: PSYCHIATRY & NEUROLOGY

## 2023-12-31 PROCEDURE — 2500000004 HC RX 250 GENERAL PHARMACY W/ HCPCS (ALT 636 FOR OP/ED): Performed by: NURSE PRACTITIONER

## 2023-12-31 PROCEDURE — 2500000002 HC RX 250 W HCPCS SELF ADMINISTERED DRUGS (ALT 637 FOR MEDICARE OP, ALT 636 FOR OP/ED): Performed by: PHYSICIAN ASSISTANT

## 2023-12-31 PROCEDURE — 36415 COLL VENOUS BLD VENIPUNCTURE: CPT | Performed by: NURSE PRACTITIONER

## 2023-12-31 RX ORDER — DIAZEPAM 5 MG/1
5 TABLET ORAL EVERY 12 HOURS
Status: DISCONTINUED | OUTPATIENT
Start: 2023-12-31 | End: 2024-01-02

## 2023-12-31 RX ADMIN — DIAZEPAM 5 MG: 5 TABLET ORAL at 12:35

## 2023-12-31 RX ADMIN — ONDANSETRON 4 MG: 2 INJECTION INTRAMUSCULAR; INTRAVENOUS at 10:40

## 2023-12-31 RX ADMIN — THIAMINE HCL TAB 100 MG 100 MG: 100 TAB at 08:14

## 2023-12-31 RX ADMIN — NICOTINE 1 PATCH: 14 PATCH, EXTENDED RELEASE TRANSDERMAL at 08:13

## 2023-12-31 RX ADMIN — AMLODIPINE BESYLATE 10 MG: 10 TABLET ORAL at 08:14

## 2023-12-31 RX ADMIN — LORAZEPAM 1 MG: 2 INJECTION, SOLUTION INTRAMUSCULAR; INTRAVENOUS at 02:07

## 2023-12-31 RX ADMIN — GABAPENTIN 600 MG: 300 CAPSULE ORAL at 21:31

## 2023-12-31 RX ADMIN — LORAZEPAM 2 MG: 2 INJECTION, SOLUTION INTRAMUSCULAR; INTRAVENOUS at 18:35

## 2023-12-31 RX ADMIN — Medication 1 TABLET: at 08:15

## 2023-12-31 RX ADMIN — BUPRENORPHINE AND NALOXONE 2 TABLET: 8; 2 TABLET SUBLINGUAL at 08:23

## 2023-12-31 RX ADMIN — DIAZEPAM 5 MG: 5 TABLET ORAL at 20:00

## 2023-12-31 RX ADMIN — DIAZEPAM 5 MG: 5 TABLET ORAL at 00:07

## 2023-12-31 RX ADMIN — PANTOPRAZOLE SODIUM 40 MG: 40 TABLET, DELAYED RELEASE ORAL at 06:07

## 2023-12-31 RX ADMIN — LORAZEPAM 2 MG: 2 INJECTION, SOLUTION INTRAMUSCULAR; INTRAVENOUS at 14:16

## 2023-12-31 RX ADMIN — VENLAFAXINE HYDROCHLORIDE 150 MG: 75 CAPSULE, EXTENDED RELEASE ORAL at 08:15

## 2023-12-31 RX ADMIN — LORAZEPAM 2 MG: 2 INJECTION, SOLUTION INTRAMUSCULAR; INTRAVENOUS at 16:28

## 2023-12-31 RX ADMIN — LORAZEPAM 2 MG: 2 INJECTION, SOLUTION INTRAMUSCULAR; INTRAVENOUS at 21:32

## 2023-12-31 RX ADMIN — LOSARTAN POTASSIUM: 100 TABLET, FILM COATED ORAL at 08:14

## 2023-12-31 RX ADMIN — LORAZEPAM 1 MG: 2 INJECTION, SOLUTION INTRAMUSCULAR; INTRAVENOUS at 08:15

## 2023-12-31 RX ADMIN — ENOXAPARIN SODIUM 40 MG: 40 INJECTION SUBCUTANEOUS at 12:35

## 2023-12-31 RX ADMIN — LORAZEPAM 2 MG: 2 INJECTION, SOLUTION INTRAMUSCULAR; INTRAVENOUS at 10:34

## 2023-12-31 RX ADMIN — GABAPENTIN 600 MG: 300 CAPSULE ORAL at 14:16

## 2023-12-31 RX ADMIN — GABAPENTIN 600 MG: 300 CAPSULE ORAL at 08:14

## 2023-12-31 RX ADMIN — FOLIC ACID 1 MG: 1 TABLET ORAL at 08:14

## 2023-12-31 RX ADMIN — LORAZEPAM 2 MG: 2 INJECTION, SOLUTION INTRAMUSCULAR; INTRAVENOUS at 04:16

## 2023-12-31 RX ADMIN — DIAZEPAM 5 MG: 5 TABLET ORAL at 06:07

## 2023-12-31 ASSESSMENT — LIFESTYLE VARIABLES
TACTILE DISTURBANCES: VERY MILD ITCHING, PINS AND NEEDLES, BURNING OR NUMBNESS
ANXIETY: MILDLY ANXIOUS
ANXIETY: MILDLY ANXIOUS
VISUAL DISTURBANCES: NOT PRESENT
ORIENTATION AND CLOUDING OF SENSORIUM: ORIENTED AND CAN DO SERIAL ADDITIONS
ORIENTATION AND CLOUDING OF SENSORIUM: ORIENTED AND CAN DO SERIAL ADDITIONS
BLOOD PRESSURE: 128/70
AGITATION: NORMAL ACTIVITY
BLOOD PRESSURE: 132/81
ORIENTATION AND CLOUDING OF SENSORIUM: ORIENTED AND CAN DO SERIAL ADDITIONS
AGITATION: NORMAL ACTIVITY
ORIENTATION AND CLOUDING OF SENSORIUM: ORIENTED AND CAN DO SERIAL ADDITIONS
HEADACHE, FULLNESS IN HEAD: NOT PRESENT
ORIENTATION AND CLOUDING OF SENSORIUM: ORIENTED AND CAN DO SERIAL ADDITIONS
TREMOR: NO TREMOR
ANXIETY: NO ANXIETY, AT EASE
TACTILE DISTURBANCES: VERY MILD ITCHING, PINS AND NEEDLES, BURNING OR NUMBNESS
HEADACHE, FULLNESS IN HEAD: MILD
AUDITORY DISTURBANCES: NOT PRESENT
AGITATION: NORMAL ACTIVITY
VISUAL DISTURBANCES: NOT PRESENT
TACTILE DISTURBANCES: MILD ITCHING, PINS AND NEEDLES, BURNING OR NUMBNESS
TOTAL SCORE: 12
PULSE: 88
PAROXYSMAL SWEATS: 3
BLOOD PRESSURE: 152/84
AUDITORY DISTURBANCES: NOT PRESENT
HEADACHE, FULLNESS IN HEAD: MILD
NAUSEA AND VOMITING: 2
TOTAL SCORE: 4
VISUAL DISTURBANCES: NOT PRESENT
HEADACHE, FULLNESS IN HEAD: VERY MILD
ANXIETY: 3
TOTAL SCORE: 12
NAUSEA AND VOMITING: MILD NAUSEA WITH NO VOMITING
NAUSEA AND VOMITING: MILD NAUSEA WITH NO VOMITING
PAROXYSMAL SWEATS: BARELY PERCEPTIBLE SWEATING, PALMS MOIST
TREMOR: 2
PAROXYSMAL SWEATS: BARELY PERCEPTIBLE SWEATING, PALMS MOIST
VISUAL DISTURBANCES: NOT PRESENT
NAUSEA AND VOMITING: MILD NAUSEA WITH NO VOMITING
ORIENTATION AND CLOUDING OF SENSORIUM: ORIENTED AND CAN DO SERIAL ADDITIONS
NAUSEA AND VOMITING: 2
TOTAL SCORE: 3
TOTAL SCORE: 2
VISUAL DISTURBANCES: NOT PRESENT
ANXIETY: MILDLY ANXIOUS
TOTAL SCORE: 11
TREMOR: NOT VISIBLE, BUT CAN BE FELT FINGERTIP TO FINGERTIP
TOTAL SCORE: 12
TREMOR: 2
NAUSEA AND VOMITING: NO NAUSEA AND NO VOMITING
HEADACHE, FULLNESS IN HEAD: MODERATE
AGITATION: NORMAL ACTIVITY
AUDITORY DISTURBANCES: NOT PRESENT
NAUSEA AND VOMITING: NO NAUSEA AND NO VOMITING
VISUAL DISTURBANCES: NOT PRESENT
PAROXYSMAL SWEATS: NO SWEAT VISIBLE
TACTILE DISTURBANCES: VERY MILD ITCHING, PINS AND NEEDLES, BURNING OR NUMBNESS
AUDITORY DISTURBANCES: NOT PRESENT
PAROXYSMAL SWEATS: 3
ANXIETY: 3
ORIENTATION AND CLOUDING OF SENSORIUM: ORIENTED AND CAN DO SERIAL ADDITIONS
TREMOR: 2
BLOOD PRESSURE: 134/77
AUDITORY DISTURBANCES: NOT PRESENT
HEADACHE, FULLNESS IN HEAD: MODERATE
AUDITORY DISTURBANCES: NOT PRESENT
TREMOR: NOT VISIBLE, BUT CAN BE FELT FINGERTIP TO FINGERTIP
AGITATION: NORMAL ACTIVITY
TOTAL SCORE: 7
NAUSEA AND VOMITING: MILD NAUSEA WITH NO VOMITING
TACTILE DISTURBANCES: VERY MILD ITCHING, PINS AND NEEDLES, BURNING OR NUMBNESS
PULSE: 85
ORIENTATION AND CLOUDING OF SENSORIUM: ORIENTED AND CAN DO SERIAL ADDITIONS
AGITATION: NORMAL ACTIVITY
PAROXYSMAL SWEATS: 2
AGITATION: NORMAL ACTIVITY
ANXIETY: MILDLY ANXIOUS
AGITATION: NORMAL ACTIVITY
ANXIETY: 2
TOTAL SCORE: 10
BLOOD PRESSURE: 136/84
NAUSEA AND VOMITING: MILD NAUSEA WITH NO VOMITING
PULSE: 75
HEADACHE, FULLNESS IN HEAD: MILD
AGITATION: NORMAL ACTIVITY
HEADACHE, FULLNESS IN HEAD: MILD
TREMOR: NOT VISIBLE, BUT CAN BE FELT FINGERTIP TO FINGERTIP
PAROXYSMAL SWEATS: 3
VISUAL DISTURBANCES: NOT PRESENT
NAUSEA AND VOMITING: 2
PAROXYSMAL SWEATS: BARELY PERCEPTIBLE SWEATING, PALMS MOIST
AUDITORY DISTURBANCES: NOT PRESENT
TOTAL SCORE: 8
PAROXYSMAL SWEATS: BARELY PERCEPTIBLE SWEATING, PALMS MOIST
ORIENTATION AND CLOUDING OF SENSORIUM: ORIENTED AND CAN DO SERIAL ADDITIONS
AGITATION: NORMAL ACTIVITY
AUDITORY DISTURBANCES: NOT PRESENT
HEADACHE, FULLNESS IN HEAD: VERY MILD
HEADACHE, FULLNESS IN HEAD: VERY MILD
ORIENTATION AND CLOUDING OF SENSORIUM: ORIENTED AND CAN DO SERIAL ADDITIONS
AUDITORY DISTURBANCES: NOT PRESENT
PAROXYSMAL SWEATS: 3
NAUSEA AND VOMITING: NO NAUSEA AND NO VOMITING
HEADACHE, FULLNESS IN HEAD: VERY MILD
ANXIETY: MILDLY ANXIOUS
ANXIETY: MILDLY ANXIOUS
TOTAL SCORE: 10
VISUAL DISTURBANCES: NOT PRESENT
ORIENTATION AND CLOUDING OF SENSORIUM: ORIENTED AND CAN DO SERIAL ADDITIONS
TREMOR: 2
AUDITORY DISTURBANCES: NOT PRESENT
PULSE: 80
AGITATION: SOMEWHAT MORE THAN NORMAL ACTIVITY
TREMOR: 3
TREMOR: 3
TACTILE DISTURBANCES: VERY MILD ITCHING, PINS AND NEEDLES, BURNING OR NUMBNESS
VISUAL DISTURBANCES: NOT PRESENT
ANXIETY: MILDLY ANXIOUS
AUDITORY DISTURBANCES: NOT PRESENT
VISUAL DISTURBANCES: NOT PRESENT
PULSE: 84
TREMOR: 3
VISUAL DISTURBANCES: NOT PRESENT
PAROXYSMAL SWEATS: BARELY PERCEPTIBLE SWEATING, PALMS MOIST

## 2023-12-31 ASSESSMENT — PAIN SCALES - GENERAL
PAINLEVEL_OUTOF10: 0 - NO PAIN
PAINLEVEL_OUTOF10: 0 - NO PAIN

## 2023-12-31 ASSESSMENT — COGNITIVE AND FUNCTIONAL STATUS - GENERAL
DAILY ACTIVITIY SCORE: 24
DAILY ACTIVITIY SCORE: 24
MOBILITY SCORE: 24
MOBILITY SCORE: 24

## 2023-12-31 ASSESSMENT — PAIN - FUNCTIONAL ASSESSMENT: PAIN_FUNCTIONAL_ASSESSMENT: 0-10

## 2023-12-31 NOTE — PROGRESS NOTES
"Deyvi Araujo is a 43 y.o. male on day 2 of admission presenting with Alcohol withdrawal syndrome, uncomplicated (CMS/HCC).    SUBJECTIVE:  Patient continued to have significant withdrawal symptoms including nausea sweating and tremors.  Patient is on CIWA protocol as well as scheduled Valium.  Patient reported that he slept better last night.  He describes his mood to be slightly better than yesterday but still feels depressed.  Patient denies having any suicidal thoughts.  He denies any hopeless or worthless feeling.  Patient is ruminating about his stress which led up to the alcohol use.     Exam:  Vital Signs: /84   Pulse 85   Temp 36.2 °C (97.2 °F)   Resp 16   Ht 1.88 m (6' 2\")   Wt 132 kg (291 lb 7.2 oz)   SpO2 92%   BMI 37.42 kg/m²   Musculoskeletal:  tremor  Gait/Station: normal      Mental Status Exam:  General Appearance: {BH Psych Appearance:[id number]  Speech: soft  Mood: depressed  Affect: appropriate  Thought Process: Linear, goal directed  Thought Associations: No loosening of associations  Thought Content: normal  Perception: No perceptual abnormalities noted  Level of Consciousness: Alert  Orientation: Alert and oriented to person, place, time and situation  Attention and Concentration: Mild impairment in attention  Cognition: Intact  Insight: Fair  Judgment: Fair     Results for orders placed or performed during the hospital encounter of 12/29/23 (from the past 96 hour(s))   CBC with Differential   Result Value Ref Range    WBC 8.9 4.4 - 11.3 x10*3/uL    nRBC 0.0 0.0 - 0.0 /100 WBCs    RBC 4.69 4.50 - 5.90 x10*6/uL    Hemoglobin 16.6 13.5 - 17.5 g/dL    Hematocrit 48.0 41.0 - 52.0 %     (H) 80 - 100 fL    MCH 35.4 (H) 26.0 - 34.0 pg    MCHC 34.6 32.0 - 36.0 g/dL    RDW 13.2 11.5 - 14.5 %    Platelets 212 150 - 450 x10*3/uL    Neutrophils % 67.4 40.0 - 80.0 %    Immature Granulocytes %, Automated 0.2 0.0 - 0.9 %    Lymphocytes % 23.2 13.0 - 44.0 %    Monocytes % 7.5 2.0 - 10.0 " %    Eosinophils % 0.9 0.0 - 6.0 %    Basophils % 0.8 0.0 - 2.0 %    Neutrophils Absolute 6.00 1.20 - 7.70 x10*3/uL    Immature Granulocytes Absolute, Automated 0.02 0.00 - 0.70 x10*3/uL    Lymphocytes Absolute 2.07 1.20 - 4.80 x10*3/uL    Monocytes Absolute 0.67 0.10 - 1.00 x10*3/uL    Eosinophils Absolute 0.08 0.00 - 0.70 x10*3/uL    Basophils Absolute 0.07 0.00 - 0.10 x10*3/uL   Comprehensive Metabolic Panel   Result Value Ref Range    Glucose 277 (H) 74 - 99 mg/dL    Sodium 131 (L) 136 - 145 mmol/L    Potassium 3.6 3.5 - 5.3 mmol/L    Chloride 96 (L) 98 - 107 mmol/L    Bicarbonate 22 21 - 32 mmol/L    Anion Gap 17 10 - 20 mmol/L    Urea Nitrogen 10 6 - 23 mg/dL    Creatinine 0.84 0.50 - 1.30 mg/dL    eGFR >90 >60 mL/min/1.73m*2    Calcium 9.4 8.6 - 10.3 mg/dL    Albumin 4.3 3.4 - 5.0 g/dL    Alkaline Phosphatase 96 33 - 120 U/L    Total Protein 7.5 6.4 - 8.2 g/dL     (H) 9 - 39 U/L    Bilirubin, Total 0.7 0.0 - 1.2 mg/dL     (H) 10 - 52 U/L   Brain Natriuretic Peptide   Result Value Ref Range    BNP 6 0 - 99 pg/mL   Protime-INR   Result Value Ref Range    Protime 11.8 9.8 - 12.8 seconds    INR 1.0 0.9 - 1.1   Troponin I, High Sensitivity   Result Value Ref Range    Troponin I, High Sensitivity 5 0 - 20 ng/L   D-dimer, VTE Exclusion   Result Value Ref Range    D-Dimer, Quantitative VTE Exclusion 299 <=500 ng/mL FEU   Acute Toxicology Panel, Blood   Result Value Ref Range    Acetaminophen <10.0 10.0 - 30.0 ug/mL    Salicylate  <3 4 - 20 mg/dL    Alcohol 61 (H) <=10 mg/dL   Magnesium   Result Value Ref Range    Magnesium 2.23 1.60 - 2.40 mg/dL   TSH with reflex to Free T4 if abnormal   Result Value Ref Range    Thyroid Stimulating Hormone 1.90 0.44 - 3.98 mIU/L   Hemoglobin A1C   Result Value Ref Range    Hemoglobin A1C 5.8 (H) see below %    Estimated Average Glucose 120 Not Established mg/dL   ECG 12 lead   Result Value Ref Range    Ventricular Rate 105 BPM    Atrial Rate 105 BPM    MA Interval 146  ms    QRS Duration 86 ms    QT Interval 348 ms    QTC Calculation(Bazett) 459 ms    P Axis 43 degrees    R Axis 31 degrees    T Axis -13 degrees    QRS Count 17 beats    Q Onset 222 ms    P Onset 149 ms    P Offset 205 ms    T Offset 396 ms    QTC Fredericia 419 ms   BLOOD GAS VENOUS FULL PANEL   Result Value Ref Range    POCT pH, Venous 7.40 7.33 - 7.43 pH    POCT pCO2, Venous 39 (L) 41 - 51 mm Hg    POCT pO2, Venous 67 (H) 35 - 45 mm Hg    POCT SO2, Venous 94 (H) 45 - 75 %    POCT Oxy Hemoglobin, Venous 88.9 (H) 45.0 - 75.0 %    POCT Hematocrit Calculated, Venous 51.0 41.0 - 52.0 %    POCT Sodium, Venous 131 (L) 136 - 145 mmol/L    POCT Potassium, Venous 3.7 3.5 - 5.3 mmol/L    POCT Chloride, Venous 101 98 - 107 mmol/L    POCT Ionized Calicum, Venous 1.22 1.10 - 1.33 mmol/L    POCT Glucose, Venous 237 (H) 74 - 99 mg/dL    POCT Lactate, Venous 3.5 (H) 0.4 - 2.0 mmol/L    POCT Base Excess, Venous -0.5 -2.0 - 3.0 mmol/L    POCT HCO3 Calculated, Venous 24.2 22.0 - 26.0 mmol/L    POCT Hemoglobin, Venous 17.0 13.5 - 17.5 g/dL    POCT Anion Gap, Venous 10.0 10.0 - 25.0 mmol/L    Patient Temperature      FiO2 21 %   Sars-CoV-2 and Influenza A/B PCR   Result Value Ref Range    Flu A Result Not Detected Not Detected    Flu B Result Not Detected Not Detected    Coronavirus 2019, PCR Not Detected Not Detected   Blood Gas Lactic Acid, Venous   Result Value Ref Range    POCT Lactate, Venous 2.0 0.4 - 2.0 mmol/L   Drug Screen, Urine   Result Value Ref Range    Amphetamine Screen, Urine Presumptive Negative Presumptive Negative    Barbiturate Screen, Urine Presumptive Negative Presumptive Negative    Benzodiazepines Screen, Urine Presumptive Negative Presumptive Negative    Cannabinoid Screen, Urine Presumptive Negative Presumptive Negative    Cocaine Metabolite Screen, Urine Presumptive Negative Presumptive Negative    Fentanyl Screen, Urine Presumptive Negative Presumptive Negative    Opiate Screen, Urine Presumptive Negative  Presumptive Negative    Oxycodone Screen, Urine Presumptive Negative Presumptive Negative    PCP Screen, Urine Presumptive Negative Presumptive Negative   Vitamin D 25-Hydroxy,Total (for eval of Vitamin D levels)   Result Value Ref Range    Vitamin D, 25-Hydroxy, Total 21 (L) 30 - 100 ng/mL   CBC   Result Value Ref Range    WBC 6.3 4.4 - 11.3 x10*3/uL    nRBC 0.0 0.0 - 0.0 /100 WBCs    RBC 3.98 (L) 4.50 - 5.90 x10*6/uL    Hemoglobin 13.9 13.5 - 17.5 g/dL    Hematocrit 41.6 41.0 - 52.0 %     (H) 80 - 100 fL    MCH 34.9 (H) 26.0 - 34.0 pg    MCHC 33.4 32.0 - 36.0 g/dL    RDW 13.0 11.5 - 14.5 %    Platelets 170 150 - 450 x10*3/uL   Vitamin B12   Result Value Ref Range    Vitamin B12 423 211 - 911 pg/mL   Comprehensive metabolic panel   Result Value Ref Range    Glucose 113 (H) 74 - 99 mg/dL    Sodium 135 (L) 136 - 145 mmol/L    Potassium 3.7 3.5 - 5.3 mmol/L    Chloride 100 98 - 107 mmol/L    Bicarbonate 25 21 - 32 mmol/L    Anion Gap 14 10 - 20 mmol/L    Urea Nitrogen 11 6 - 23 mg/dL    Creatinine 0.75 0.50 - 1.30 mg/dL    eGFR >90 >60 mL/min/1.73m*2    Calcium 8.6 8.6 - 10.3 mg/dL    Albumin 3.4 3.4 - 5.0 g/dL    Alkaline Phosphatase 73 33 - 120 U/L    Total Protein 6.5 6.4 - 8.2 g/dL    AST 83 (H) 9 - 39 U/L    Bilirubin, Total 0.6 0.0 - 1.2 mg/dL     (H) 10 - 52 U/L   CBC   Result Value Ref Range    WBC 6.3 4.4 - 11.3 x10*3/uL    nRBC 0.0 0.0 - 0.0 /100 WBCs    RBC 4.37 (L) 4.50 - 5.90 x10*6/uL    Hemoglobin 15.4 13.5 - 17.5 g/dL    Hematocrit 44.5 41.0 - 52.0 %     (H) 80 - 100 fL    MCH 35.2 (H) 26.0 - 34.0 pg    MCHC 34.6 32.0 - 36.0 g/dL    RDW 12.6 11.5 - 14.5 %    Platelets 208 150 - 450 x10*3/uL   Comprehensive metabolic panel   Result Value Ref Range    Glucose 104 (H) 74 - 99 mg/dL    Sodium 133 (L) 136 - 145 mmol/L    Potassium 3.5 3.5 - 5.3 mmol/L    Chloride 98 98 - 107 mmol/L    Bicarbonate 27 21 - 32 mmol/L    Anion Gap 12 10 - 20 mmol/L    Urea Nitrogen 11 6 - 23 mg/dL     Creatinine 0.71 0.50 - 1.30 mg/dL    eGFR >90 >60 mL/min/1.73m*2    Calcium 9.3 8.6 - 10.3 mg/dL    Albumin 3.9 3.4 - 5.0 g/dL    Alkaline Phosphatase 89 33 - 120 U/L    Total Protein 7.4 6.4 - 8.2 g/dL    AST 89 (H) 9 - 39 U/L    Bilirubin, Total 0.9 0.0 - 1.2 mg/dL     (H) 10 - 52 U/L         Impression: Alcohol dependence severe  Substance-induced mood disorder  MDD recurrent       Recommendations:    Impression:    Medication reviewed.  Continue CIWA protocol  Taper and stop the Valium  Increase Effexor XR to 187.5 mg  Continue Seroquel  Patient is not at imminent risk of suicide.  Patient will be benefiting from chemical dependency rehabilitation treatment.   Consult  to discuss with the patient about chemical dependency treatment options  Continue to follow during the stay in the hospital      Thank you for allowing us to participate in the care of this patient.       Brown Babb MD  12/31/2023  12:03 PM

## 2023-12-31 NOTE — CARE PLAN
The clinical goals for the shift include See plan of care 12/31 1700    Pt continues to have sweats, anxiety, mild HA and stomach cramping throughout shift.  Medicated appropriately.

## 2023-12-31 NOTE — PROGRESS NOTES
MRN: [id number]  SERVICE DATE:  12/31/2023   SERVICE TIME:  8:26 AM  Today's Date: 12/31/23  Admission Date: 12/29/2023  Hospital Day: #2    Subjective      Patient seen today more awake and conversant.  Getting his scheduled benzodiazepines        Objective      ROS:   General: Denies any change in weight, fever, chills, fatigue.   Head and Neck: Denies any headaches, syncope or history of head injury.   Eyes/Ears: Denies any cataracts, glaucoma, blurred vision, tinnitus.   Nose: Denies any epistaxis or sinus problems   Respiratory: Denies any cough, hemoptysis, wheezing, asthma, dyspnea.   Cardiovascular: No orthopnea, dyspnea, palpitations, congestive heart failure.   Gastrointestinal: Denies any indigestion, nausea, vomiting, diarrhea, constipation.   Neuro: No dizzyness, headache or syncope   ID: No fever or chills.   Endocrine: No weight loss or weight gain.  No thyroid or diabetic complaints     MEDICATIONS:  Current Facility-Administered Medications   Medication Dose Route Frequency Provider Last Rate Last Admin    amLODIPine (Norvasc) tablet 10 mg  10 mg oral Daily Nikky Mei, APRN-CNP   10 mg at 12/31/23 0814    buprenorphine-naloxone (Suboxone) 8-2 mg per SL tablet 2 tablet  2 tablet sublingual Daily Abi Bañuelos APRN-CNP   2 tablet at 12/31/23 0823    diazePAM (Valium) tablet 5 mg  5 mg oral q6h [facility] Nikky Mei, APRN-CNP   5 mg at 12/31/23 0607    enoxaparin (Lovenox) syringe 40 mg  40 mg subcutaneous q24h Nikky Mei, APRN-CNP   40 mg at 12/30/23 1214    folic acid (Folvite) tablet 1 mg  1 mg oral Daily Nikky Mei, APRN-CNP   1 mg at 12/31/23 0814    gabapentin (Neurontin) tablet 600 mg  600 mg oral TID Nikky Mei, APRN-CNP   600 mg at 12/31/23 0814    LORazepam (Ativan) injection 0.5 mg  0.5 mg intravenous q2h PRN Nikkymariaa Mei, APRN-CNP   0.5 mg at 12/30/23 0824    Or    LORazepam (Ativan) injection 1 mg  1 mg intravenous q2h PRN Nikky Mei, APRN-CNP   1 mg at 12/31/23  "0815    Or    LORazepam (Ativan) injection 2 mg  2 mg intravenous q2h PRN Nikky Mei, APRN-CNP   2 mg at 12/31/23 0416    losartan 100 mg, hydroCHLOROthiazide 12.5 mg for Hyzaar 100/12.5   oral Daily Nikky MICHELLE Mei, APRN-CNP   Given at 12/31/23 0814    multivitamin with minerals 1 tablet  1 tablet oral Daily Nikkymariaa Mei, APRN-CNP   1 tablet at 12/31/23 0815    nicotine (Nicoderm CQ) 14 mg/24 hr patch 1 patch  1 patch transdermal Daily Nikky Mei, APRN-CNP   1 patch at 12/31/23 0813    Followed by    [START ON 2/9/2024] nicotine (Nicoderm CQ) 7 mg/24 hr patch 1 patch  1 patch transdermal Daily Nikky Mei, APRN-CNP        ondansetron (Zofran) injection 4 mg  4 mg intravenous q6h PRN Nikky Mei, APRN-CNP   4 mg at 12/30/23 2219    pantoprazole (ProtoNix) EC tablet 40 mg  40 mg oral Daily before breakfast Nikky Mei, APRN-CNP   40 mg at 12/31/23 0607    polyethylene glycol (Glycolax, Miralax) packet 17 g  17 g oral Daily PRN Nikky Mei, APRN-CNP        QUEtiapine (SEROquel) tablet 25 mg  25 mg oral Nightly Nikky Mei, APRN-CNP   25 mg at 12/29/23 2049    thiamine (Vitamin B-1) tablet 100 mg  100 mg oral Daily Nikky Mei, APRN-CNP   100 mg at 12/31/23 0814    venlafaxine XR (Effexor-XR) 24 hr capsule 150 mg  150 mg oral Daily Nikky Mei, APRN-CNP   150 mg at 12/31/23 0815         PHYSICAL EXAM:   /77   Pulse 75   Temp 36.7 °C (98.1 °F)   Resp 18   Ht 1.88 m (6' 2\")   Wt 132 kg (291 lb 7.2 oz)   SpO2 96%   BMI 37.42 kg/m²      CONSTITUTIONAL - well nourished, well developed, looks like stated age, in no acute distress, not ill-appearing   SKIN - normal skin color and pigmentation, normal skin turgor without rash   HEAD - no trauma, normocephalic  EYES - pupils are equal and reactive to light, extraocular muscles are intact, and normal external exam  ENT - TM's intact, no injection, no signs of infection, uvula midline, normal tongue movement and throat " "normal  NECK - supple without rigidity, no neck mass was observed, no thyromegaly    CHEST - clear to auscultation, no wheezing, no crackles and no rales, good effort  CARDIAC - regular rate and regular rhythm, no skipped beats, no murmur  ABDOMEN - no organomegaly, soft, nontender, nondistended, normal bowel sounds   NEUROLOGICAL -still with some tremulousness  PSYCHIATRIC - alert, pleasant and cordial, age-appropriate  IMMUNOLOGIC - no cervical lymphadenopathy       Labs:  Lab Results   Component Value Date    WBC 6.3 12/31/2023    HGB 15.4 12/31/2023    HCT 44.5 12/31/2023     (H) 12/31/2023     12/31/2023     Lab Results   Component Value Date    GLUCOSE 104 (H) 12/31/2023    CALCIUM 9.3 12/31/2023     (L) 12/31/2023    K 3.5 12/31/2023    CO2 27 12/31/2023    CL 98 12/31/2023    BUN 11 12/31/2023    CREATININE 0.71 12/31/2023   ESR: --No results found for: \"SEDRATE\"No results found for: \"CRP\"  Lab Results   Component Value Date     (H) 12/31/2023    AST 89 (H) 12/31/2023    ALKPHOS 89 12/31/2023    BILITOT 0.9 12/31/2023                      Problem List Items Addressed This Visit             ICD-10-CM    * (Principal) Alcohol withdrawal syndrome, uncomplicated (CMS/[facility]) - Primary F10.930     Other Visit Diagnoses         Codes    Chest pain, unspecified type     R07.9    Other specified diabetes mellitus with other specified complication, without long-term current use of insulin (CMS/[facility])     E13.69    Transaminitis     R74.01    Alcohol abuse     F10.10          Continue CIWA, alcohol withdrawal protocol, recheck electrolytes  PT OT  DVT prophylaxis              David Chadwick MD  This note was created using [employer]. Any inadvertent grammar, spelling or syntax errors are do to voice recognition software error and are not intentional.      "

## 2023-12-31 NOTE — NURSING NOTE
EOS: Patient had intermittent rest through the night. He continues to require medication to treat withdrawal symptoms due to CIWA scores, as documented. His safety has been maintained. He is stable at the time of writing.

## 2024-01-01 LAB
ALBUMIN SERPL BCP-MCNC: 4.2 G/DL (ref 3.4–5)
ALP SERPL-CCNC: 97 U/L (ref 33–120)
ALT SERPL W P-5'-P-CCNC: 146 U/L (ref 10–52)
ANION GAP SERPL CALC-SCNC: 15 MMOL/L (ref 10–20)
AST SERPL W P-5'-P-CCNC: 82 U/L (ref 9–39)
ATRIAL RATE: 105 BPM
BILIRUB SERPL-MCNC: 0.8 MG/DL (ref 0–1.2)
BUN SERPL-MCNC: 16 MG/DL (ref 6–23)
CALCIUM SERPL-MCNC: 10 MG/DL (ref 8.6–10.3)
CHLORIDE SERPL-SCNC: 97 MMOL/L (ref 98–107)
CO2 SERPL-SCNC: 24 MMOL/L (ref 21–32)
CREAT SERPL-MCNC: 0.89 MG/DL (ref 0.5–1.3)
ERYTHROCYTE [DISTWIDTH] IN BLOOD BY AUTOMATED COUNT: 12.9 % (ref 11.5–14.5)
GFR SERPL CREATININE-BSD FRML MDRD: >90 ML/MIN/1.73M*2
GLUCOSE SERPL-MCNC: 123 MG/DL (ref 74–99)
HCT VFR BLD AUTO: 49.9 % (ref 41–52)
HGB BLD-MCNC: 17.7 G/DL (ref 13.5–17.5)
MCH RBC QN AUTO: 35.8 PG (ref 26–34)
MCHC RBC AUTO-ENTMCNC: 35.5 G/DL (ref 32–36)
MCV RBC AUTO: 101 FL (ref 80–100)
NRBC BLD-RTO: 0 /100 WBCS (ref 0–0)
P AXIS: 43 DEGREES
P OFFSET: 205 MS
P ONSET: 149 MS
PLATELET # BLD AUTO: 252 X10*3/UL (ref 150–450)
POTASSIUM SERPL-SCNC: 3.8 MMOL/L (ref 3.5–5.3)
PR INTERVAL: 146 MS
PROT SERPL-MCNC: 8.2 G/DL (ref 6.4–8.2)
Q ONSET: 222 MS
QRS COUNT: 17 BEATS
QRS DURATION: 86 MS
QT INTERVAL: 348 MS
QTC CALCULATION(BAZETT): 459 MS
QTC FREDERICIA: 419 MS
R AXIS: 31 DEGREES
RBC # BLD AUTO: 4.95 X10*6/UL (ref 4.5–5.9)
SODIUM SERPL-SCNC: 132 MMOL/L (ref 136–145)
T AXIS: -13 DEGREES
T OFFSET: 396 MS
VENTRICULAR RATE: 105 BPM
WBC # BLD AUTO: 9.6 X10*3/UL (ref 4.4–11.3)

## 2024-01-01 PROCEDURE — 85027 COMPLETE CBC AUTOMATED: CPT | Performed by: NURSE PRACTITIONER

## 2024-01-01 PROCEDURE — 80053 COMPREHEN METABOLIC PANEL: CPT | Performed by: NURSE PRACTITIONER

## 2024-01-01 PROCEDURE — 2500000002 HC RX 250 W HCPCS SELF ADMINISTERED DRUGS (ALT 637 FOR MEDICARE OP, ALT 636 FOR OP/ED): Performed by: NURSE PRACTITIONER

## 2024-01-01 PROCEDURE — 2500000004 HC RX 250 GENERAL PHARMACY W/ HCPCS (ALT 636 FOR OP/ED): Performed by: PHYSICIAN ASSISTANT

## 2024-01-01 PROCEDURE — 2500000001 HC RX 250 WO HCPCS SELF ADMINISTERED DRUGS (ALT 637 FOR MEDICARE OP): Performed by: NURSE PRACTITIONER

## 2024-01-01 PROCEDURE — 2500000001 HC RX 250 WO HCPCS SELF ADMINISTERED DRUGS (ALT 637 FOR MEDICARE OP): Performed by: PSYCHIATRY & NEUROLOGY

## 2024-01-01 PROCEDURE — 96372 THER/PROPH/DIAG INJ SC/IM: CPT | Performed by: NURSE PRACTITIONER

## 2024-01-01 PROCEDURE — 2500000004 HC RX 250 GENERAL PHARMACY W/ HCPCS (ALT 636 FOR OP/ED): Performed by: PSYCHIATRY & NEUROLOGY

## 2024-01-01 PROCEDURE — 1200000002 HC GENERAL ROOM WITH TELEMETRY DAILY

## 2024-01-01 PROCEDURE — 2500000004 HC RX 250 GENERAL PHARMACY W/ HCPCS (ALT 636 FOR OP/ED): Performed by: NURSE PRACTITIONER

## 2024-01-01 PROCEDURE — 2500000002 HC RX 250 W HCPCS SELF ADMINISTERED DRUGS (ALT 637 FOR MEDICARE OP, ALT 636 FOR OP/ED): Performed by: PHYSICIAN ASSISTANT

## 2024-01-01 PROCEDURE — S4991 NICOTINE PATCH NONLEGEND: HCPCS | Performed by: NURSE PRACTITIONER

## 2024-01-01 PROCEDURE — 36415 COLL VENOUS BLD VENIPUNCTURE: CPT | Performed by: NURSE PRACTITIONER

## 2024-01-01 RX ADMIN — ONDANSETRON 4 MG: 2 INJECTION INTRAMUSCULAR; INTRAVENOUS at 09:15

## 2024-01-01 RX ADMIN — LORAZEPAM 2 MG: 2 INJECTION, SOLUTION INTRAMUSCULAR; INTRAVENOUS at 14:06

## 2024-01-01 RX ADMIN — FOLIC ACID 1 MG: 1 TABLET ORAL at 09:15

## 2024-01-01 RX ADMIN — THIAMINE HCL TAB 100 MG 100 MG: 100 TAB at 09:15

## 2024-01-01 RX ADMIN — PANTOPRAZOLE SODIUM 40 MG: 40 TABLET, DELAYED RELEASE ORAL at 06:22

## 2024-01-01 RX ADMIN — SODIUM CHLORIDE, POTASSIUM CHLORIDE, SODIUM LACTATE AND CALCIUM CHLORIDE 1000 ML: 600; 310; 30; 20 INJECTION, SOLUTION INTRAVENOUS at 20:18

## 2024-01-01 RX ADMIN — LORAZEPAM 2 MG: 2 INJECTION, SOLUTION INTRAMUSCULAR; INTRAVENOUS at 11:17

## 2024-01-01 RX ADMIN — LORAZEPAM 0.5 MG: 2 INJECTION, SOLUTION INTRAMUSCULAR; INTRAVENOUS at 02:16

## 2024-01-01 RX ADMIN — LORAZEPAM 2 MG: 2 INJECTION, SOLUTION INTRAMUSCULAR; INTRAVENOUS at 20:17

## 2024-01-01 RX ADMIN — DIAZEPAM 5 MG: 5 TABLET ORAL at 09:15

## 2024-01-01 RX ADMIN — LORAZEPAM 2 MG: 2 INJECTION, SOLUTION INTRAMUSCULAR; INTRAVENOUS at 09:16

## 2024-01-01 RX ADMIN — GABAPENTIN 600 MG: 300 CAPSULE ORAL at 20:16

## 2024-01-01 RX ADMIN — LORAZEPAM 2 MG: 2 INJECTION, SOLUTION INTRAMUSCULAR; INTRAVENOUS at 16:56

## 2024-01-01 RX ADMIN — GABAPENTIN 600 MG: 300 CAPSULE ORAL at 14:06

## 2024-01-01 RX ADMIN — NICOTINE 1 PATCH: 14 PATCH, EXTENDED RELEASE TRANSDERMAL at 09:16

## 2024-01-01 RX ADMIN — DIAZEPAM 5 MG: 5 TABLET ORAL at 20:16

## 2024-01-01 RX ADMIN — BUPRENORPHINE AND NALOXONE 2 TABLET: 8; 2 TABLET SUBLINGUAL at 09:15

## 2024-01-01 RX ADMIN — LOSARTAN POTASSIUM: 100 TABLET, FILM COATED ORAL at 09:15

## 2024-01-01 RX ADMIN — QUETIAPINE FUMARATE 25 MG: 25 TABLET ORAL at 20:16

## 2024-01-01 RX ADMIN — AMLODIPINE BESYLATE 10 MG: 10 TABLET ORAL at 09:15

## 2024-01-01 RX ADMIN — LORAZEPAM 0.5 MG: 2 INJECTION, SOLUTION INTRAMUSCULAR; INTRAVENOUS at 04:18

## 2024-01-01 RX ADMIN — VENLAFAXINE HYDROCHLORIDE 187.5 MG: 75 CAPSULE, EXTENDED RELEASE ORAL at 09:15

## 2024-01-01 RX ADMIN — LORAZEPAM 1 MG: 2 INJECTION, SOLUTION INTRAMUSCULAR; INTRAVENOUS at 06:21

## 2024-01-01 RX ADMIN — LORAZEPAM 1 MG: 2 INJECTION, SOLUTION INTRAMUSCULAR; INTRAVENOUS at 00:14

## 2024-01-01 RX ADMIN — Medication 1 TABLET: at 09:15

## 2024-01-01 RX ADMIN — ONDANSETRON 4 MG: 2 INJECTION INTRAMUSCULAR; INTRAVENOUS at 20:16

## 2024-01-01 RX ADMIN — GABAPENTIN 600 MG: 300 CAPSULE ORAL at 09:15

## 2024-01-01 RX ADMIN — LORAZEPAM 2 MG: 2 INJECTION, SOLUTION INTRAMUSCULAR; INTRAVENOUS at 22:12

## 2024-01-01 RX ADMIN — ENOXAPARIN SODIUM 40 MG: 40 INJECTION SUBCUTANEOUS at 11:11

## 2024-01-01 ASSESSMENT — LIFESTYLE VARIABLES
ORIENTATION AND CLOUDING OF SENSORIUM: ORIENTED AND CAN DO SERIAL ADDITIONS
TACTILE DISTURBANCES: MILD ITCHING, PINS AND NEEDLES, BURNING OR NUMBNESS
AUDITORY DISTURBANCES: NOT PRESENT
TREMOR: NOT VISIBLE, BUT CAN BE FELT FINGERTIP TO FINGERTIP
AGITATION: NORMAL ACTIVITY
HEADACHE, FULLNESS IN HEAD: MILD
TACTILE DISTURBANCES: VERY MILD ITCHING, PINS AND NEEDLES, BURNING OR NUMBNESS
VISUAL DISTURBANCES: NOT PRESENT
BLOOD PRESSURE: 133/65
PAROXYSMAL SWEATS: BEADS OF SWEAT OBVIOUS ON FOREHEAD
NAUSEA AND VOMITING: MILD NAUSEA WITH NO VOMITING
TACTILE DISTURBANCES: VERY MILD ITCHING, PINS AND NEEDLES, BURNING OR NUMBNESS
VISUAL DISTURBANCES: NOT PRESENT
TOTAL SCORE: 13
ANXIETY: 3
PAROXYSMAL SWEATS: 3
ANXIETY: 2
TREMOR: 2
AGITATION: NORMAL ACTIVITY
TREMOR: 2
ANXIETY: 3
TOTAL SCORE: 5
NAUSEA AND VOMITING: MILD NAUSEA WITH NO VOMITING
TOTAL SCORE: 7
VISUAL DISTURBANCES: NOT PRESENT
TOTAL SCORE: 17
ORIENTATION AND CLOUDING OF SENSORIUM: ORIENTED AND CAN DO SERIAL ADDITIONS
AGITATION: NORMAL ACTIVITY
VISUAL DISTURBANCES: NOT PRESENT
ANXIETY: MILDLY ANXIOUS
ORIENTATION AND CLOUDING OF SENSORIUM: ORIENTED AND CAN DO SERIAL ADDITIONS
PULSE: 90
VISUAL DISTURBANCES: NOT PRESENT
VISUAL DISTURBANCES: NOT PRESENT
TOTAL SCORE: 16
PAROXYSMAL SWEATS: 2
PAROXYSMAL SWEATS: 5
TOTAL SCORE: 4
NAUSEA AND VOMITING: 3
AGITATION: NORMAL ACTIVITY
ANXIETY: NO ANXIETY, AT EASE
BLOOD PRESSURE: 126/78
TREMOR: NO TREMOR
HEADACHE, FULLNESS IN HEAD: MILD
ANXIETY: 5
HEADACHE, FULLNESS IN HEAD: MILD
AUDITORY DISTURBANCES: NOT PRESENT
TOTAL SCORE: 16
AGITATION: NORMAL ACTIVITY
TREMOR: NO TREMOR
HEADACHE, FULLNESS IN HEAD: MODERATE
PULSE: 96
PULSE: 70
VISUAL DISTURBANCES: NOT PRESENT
PAROXYSMAL SWEATS: 2
AUDITORY DISTURBANCES: NOT PRESENT
TOTAL SCORE: 7
AGITATION: NORMAL ACTIVITY
ORIENTATION AND CLOUDING OF SENSORIUM: ORIENTED AND CAN DO SERIAL ADDITIONS
NAUSEA AND VOMITING: MILD NAUSEA WITH NO VOMITING
AUDITORY DISTURBANCES: NOT PRESENT
PAROXYSMAL SWEATS: DRENCHING SWEATS
ORIENTATION AND CLOUDING OF SENSORIUM: ORIENTED AND CAN DO SERIAL ADDITIONS
PULSE: 71
PAROXYSMAL SWEATS: 2
PAROXYSMAL SWEATS: BARELY PERCEPTIBLE SWEATING, PALMS MOIST
VISUAL DISTURBANCES: NOT PRESENT
TREMOR: 2
TREMOR: NOT VISIBLE, BUT CAN BE FELT FINGERTIP TO FINGERTIP
TOTAL SCORE: 13
NAUSEA AND VOMITING: 2
AUDITORY DISTURBANCES: NOT PRESENT
ANXIETY: MILDLY ANXIOUS
TOTAL SCORE: 11
ANXIETY: MILDLY ANXIOUS
AGITATION: NORMAL ACTIVITY
AUDITORY DISTURBANCES: NOT PRESENT
PULSE: 79
TREMOR: NOT VISIBLE, BUT CAN BE FELT FINGERTIP TO FINGERTIP
ANXIETY: NO ANXIETY, AT EASE
HEADACHE, FULLNESS IN HEAD: VERY MILD
HEADACHE, FULLNESS IN HEAD: VERY MILD
BLOOD PRESSURE: 124/80
ORIENTATION AND CLOUDING OF SENSORIUM: ORIENTED AND CAN DO SERIAL ADDITIONS
ORIENTATION AND CLOUDING OF SENSORIUM: ORIENTED AND CAN DO SERIAL ADDITIONS
BLOOD PRESSURE: 122/82
ORIENTATION AND CLOUDING OF SENSORIUM: ORIENTED AND CAN DO SERIAL ADDITIONS
AUDITORY DISTURBANCES: NOT PRESENT
TOTAL SCORE: 8
TREMOR: NOT VISIBLE, BUT CAN BE FELT FINGERTIP TO FINGERTIP
PAROXYSMAL SWEATS: DRENCHING SWEATS
ANXIETY: MODERATELY ANXIOUS, OR GUARDED, SO ANXIETY IS INFERRED
PULSE: 80
TOTAL SCORE: 8
TACTILE DISTURBANCES: VERY MILD ITCHING, PINS AND NEEDLES, BURNING OR NUMBNESS
AUDITORY DISTURBANCES: NOT PRESENT
HEADACHE, FULLNESS IN HEAD: MILD
BLOOD PRESSURE: 127/79
TREMOR: NOT VISIBLE, BUT CAN BE FELT FINGERTIP TO FINGERTIP
AUDITORY DISTURBANCES: NOT PRESENT
NAUSEA AND VOMITING: 2
HEADACHE, FULLNESS IN HEAD: VERY MILD
HEADACHE, FULLNESS IN HEAD: VERY MILD
AGITATION: SOMEWHAT MORE THAN NORMAL ACTIVITY
VISUAL DISTURBANCES: NOT PRESENT
ORIENTATION AND CLOUDING OF SENSORIUM: ORIENTED AND CAN DO SERIAL ADDITIONS
AGITATION: NORMAL ACTIVITY
PAROXYSMAL SWEATS: BARELY PERCEPTIBLE SWEATING, PALMS MOIST
NAUSEA AND VOMITING: MILD NAUSEA WITH NO VOMITING
AUDITORY DISTURBANCES: NOT PRESENT
HEADACHE, FULLNESS IN HEAD: NOT PRESENT
PAROXYSMAL SWEATS: 2
AGITATION: SOMEWHAT MORE THAN NORMAL ACTIVITY
AGITATION: NORMAL ACTIVITY
HEADACHE, FULLNESS IN HEAD: MILD
HEADACHE, FULLNESS IN HEAD: MILD
ORIENTATION AND CLOUDING OF SENSORIUM: ORIENTED AND CAN DO SERIAL ADDITIONS
PAROXYSMAL SWEATS: 2
AGITATION: NORMAL ACTIVITY
TREMOR: 2
PULSE: 77
TOTAL SCORE: 16
AGITATION: NORMAL ACTIVITY
HEADACHE, FULLNESS IN HEAD: MILD
BLOOD PRESSURE: 136/86
NAUSEA AND VOMITING: INTERMITTENT NAUSEA WITH DRY HEAVES
VISUAL DISTURBANCES: NOT PRESENT
PAROXYSMAL SWEATS: 5
NAUSEA AND VOMITING: MILD NAUSEA WITH NO VOMITING
TACTILE DISTURBANCES: VERY MILD ITCHING, PINS AND NEEDLES, BURNING OR NUMBNESS
ORIENTATION AND CLOUDING OF SENSORIUM: ORIENTED AND CAN DO SERIAL ADDITIONS
VISUAL DISTURBANCES: NOT PRESENT
TOTAL SCORE: 3
HEADACHE, FULLNESS IN HEAD: NOT PRESENT
VISUAL DISTURBANCES: NOT PRESENT
ORIENTATION AND CLOUDING OF SENSORIUM: ORIENTED AND CAN DO SERIAL ADDITIONS
VISUAL DISTURBANCES: NOT PRESENT
TACTILE DISTURBANCES: VERY MILD ITCHING, PINS AND NEEDLES, BURNING OR NUMBNESS
TREMOR: NOT VISIBLE, BUT CAN BE FELT FINGERTIP TO FINGERTIP
PULSE: 89
VISUAL DISTURBANCES: VERY MILD SENSITIVITY
ORIENTATION AND CLOUDING OF SENSORIUM: ORIENTED AND CAN DO SERIAL ADDITIONS
NAUSEA AND VOMITING: NO NAUSEA AND NO VOMITING
NAUSEA AND VOMITING: 3
BLOOD PRESSURE: 126/74
BLOOD PRESSURE: 117/75
TACTILE DISTURBANCES: VERY MILD ITCHING, PINS AND NEEDLES, BURNING OR NUMBNESS
AUDITORY DISTURBANCES: NOT PRESENT
BLOOD PRESSURE: 134/80
PULSE: 66
ANXIETY: 3
PAROXYSMAL SWEATS: 2
TREMOR: NOT VISIBLE, BUT CAN BE FELT FINGERTIP TO FINGERTIP
NAUSEA AND VOMITING: MILD NAUSEA WITH NO VOMITING
AUDITORY DISTURBANCES: NOT PRESENT
AUDITORY DISTURBANCES: NOT PRESENT
NAUSEA AND VOMITING: NO NAUSEA AND NO VOMITING
ANXIETY: NO ANXIETY, AT EASE
AGITATION: NORMAL ACTIVITY
AUDITORY DISTURBANCES: NOT PRESENT
ORIENTATION AND CLOUDING OF SENSORIUM: ORIENTED AND CAN DO SERIAL ADDITIONS
NAUSEA AND VOMITING: 2
ANXIETY: MODERATELY ANXIOUS, OR GUARDED, SO ANXIETY IS INFERRED
ANXIETY: MILDLY ANXIOUS
TREMOR: 2

## 2024-01-01 ASSESSMENT — COGNITIVE AND FUNCTIONAL STATUS - GENERAL
MOBILITY SCORE: 24
DAILY ACTIVITIY SCORE: 24
MOBILITY SCORE: 24
DAILY ACTIVITIY SCORE: 24

## 2024-01-01 ASSESSMENT — PAIN SCALES - GENERAL
PAINLEVEL_OUTOF10: 0 - NO PAIN
PAINLEVEL_OUTOF10: 0 - NO PAIN

## 2024-01-01 NOTE — NURSING NOTE
EOS: Patient continues to require treatment for CIWA scores, though has required decreased ativan through the night as documented. His safety has been maintained. He is stable at the time of writing.

## 2024-01-01 NOTE — NURSING NOTE
Pt calm and cooperative through shift. Pt had high CIWAs this shift and felt nauseated. Pt rested in room and repositioned self this shift. Pt given Ativan almost every two hours this shift.

## 2024-01-01 NOTE — PROGRESS NOTES
MRN: [id number]  SERVICE DATE:  1/1/2024   SERVICE TIME:  9:37 AM  Today's Date: 01/01/24  Admission Date: 12/29/2023  Hospital Day: #3    Subjective      Patient seen today laying in bed more awake and conversant slowly weaning off his Valium        Objective      ROS:   General: Denies any change in weight, fever, chills, fatigue.   Head and Neck: Denies any headaches, syncope or history of head injury.   Eyes/Ears: Denies any cataracts, glaucoma, blurred vision, tinnitus.   Nose: Denies any epistaxis or sinus problems   Respiratory: Denies any cough, hemoptysis, wheezing, asthma, dyspnea.   Cardiovascular: No orthopnea, dyspnea, palpitations, congestive heart failure.   Gastrointestinal: Denies any indigestion, nausea, vomiting, diarrhea, constipation.   Neuro: No dizzyness, headache or syncope   ID: No fever or chills.   Endocrine: No weight loss or weight gain.  No thyroid or diabetic complaints     MEDICATIONS:  Current Facility-Administered Medications   Medication Dose Route Frequency Provider Last Rate Last Admin    amLODIPine (Norvasc) tablet 10 mg  10 mg oral Daily Nikky Mei APRN-CNP   10 mg at 01/01/24 0915    buprenorphine-naloxone (Suboxone) 8-2 mg per SL tablet 2 tablet  2 tablet sublingual Daily DIMITRI Cohen-CNP   2 tablet at 01/01/24 0915    diazePAM (Valium) tablet 5 mg  5 mg oral q12h Thania Jackson PA-C   5 mg at 01/01/24 0915    enoxaparin (Lovenox) syringe 40 mg  40 mg subcutaneous q24h Nikky Mei APRN-CNP   40 mg at 12/31/23 1235    folic acid (Folvite) tablet 1 mg  1 mg oral Daily Nikky Mei APRN-CNP   1 mg at 01/01/24 0915    gabapentin (Neurontin) tablet 600 mg  600 mg oral TID Nikky Mei APRN-CNP   600 mg at 01/01/24 0915    LORazepam (Ativan) injection 0.5 mg  0.5 mg intravenous q2h PRN Nikky Mei APRN-CNP   0.5 mg at 01/01/24 0418    Or    LORazepam (Ativan) injection 1 mg  1 mg intravenous q2h PRN Nikky M Bartos, APRN-CNP   1 mg at 01/01/24  "0621    Or    LORazepam (Ativan) injection 2 mg  2 mg intravenous q2h PRN Nikky Mei APRN-CNP   2 mg at 01/01/24 0916    losartan 100 mg, hydroCHLOROthiazide 12.5 mg for Hyzaar 100/12.5   oral Daily Nikky Mei APRN-CNP   Given at 01/01/24 0915    multivitamin with minerals 1 tablet  1 tablet oral Daily Nikky Mei APRN-CNP   1 tablet at 01/01/24 0915    nicotine (Nicoderm CQ) 14 mg/24 hr patch 1 patch  1 patch transdermal Daily Nikky Mei APRN-CNP   1 patch at 01/01/24 0916    Followed by    [START ON 2/9/2024] nicotine (Nicoderm CQ) 7 mg/24 hr patch 1 patch  1 patch transdermal Daily Nikky Mei APRN-CNP        ondansetron (Zofran) injection 4 mg  4 mg intravenous q6h PRN Nikky Mei APRN-CNP   4 mg at 01/01/24 0915    pantoprazole (ProtoNix) EC tablet 40 mg  40 mg oral Daily before breakfast Nikky Mei APRN-CNP   40 mg at 01/01/24 0622    polyethylene glycol (Glycolax, Miralax) packet 17 g  17 g oral Daily PRN Nikky Mei APRN-CNP        QUEtiapine (SEROquel) tablet 25 mg  25 mg oral Nightly Nikky Mei APRN-CNP   25 mg at 12/29/23 2049    thiamine (Vitamin B-1) tablet 100 mg  100 mg oral Daily Nikky Mei APRN-CNP   100 mg at 01/01/24 0915    venlafaxine XR (Effexor-XR) 24 hr capsule 187.5 mg  187.5 mg oral Daily Brown Babb MD   187.5 mg at 01/01/24 0915         PHYSICAL EXAM:   /84 (BP Location: Left arm, Patient Position: Lying)   Pulse 90   Temp 37.1 °C (98.8 °F) (Temporal)   Resp 18   Ht 1.88 m (6' 2\")   Wt 132 kg (291 lb 7.2 oz)   SpO2 96%   BMI 37.42 kg/m²      CONSTITUTIONAL - well nourished, well developed, looks like stated age, in no acute distress, not ill-appearing   SKIN - normal skin color and pigmentation, normal skin turgor without rash   HEAD - no trauma, normocephalic  EYES - pupils are equal and reactive to light, extraocular muscles are intact, and normal external exam  ENT - TM's intact, no injection, no signs of " "infection, uvula midline, normal tongue movement and throat normal  NECK - supple without rigidity, no neck mass was observed, no thyromegaly    CHEST - clear to auscultation, no wheezing, no crackles and no rales, good effort  CARDIAC - regular rate and regular rhythm, no skipped beats, no murmur  ABDOMEN - no organomegaly, soft, nontender, nondistended, normal bowel sounds   NEUROLOGICAL -still intermittently confused       Labs:  Lab Results   Component Value Date    WBC 9.6 01/01/2024    HGB 17.7 (H) 01/01/2024    HCT 49.9 01/01/2024     (H) 01/01/2024     01/01/2024     Lab Results   Component Value Date    GLUCOSE 123 (H) 01/01/2024    CALCIUM 10.0 01/01/2024     (L) 01/01/2024    K 3.8 01/01/2024    CO2 24 01/01/2024    CL 97 (L) 01/01/2024    BUN 16 01/01/2024    CREATININE 0.89 01/01/2024   ESR: --No results found for: \"SEDRATE\"No results found for: \"CRP\"  Lab Results   Component Value Date     (H) 01/01/2024    AST 82 (H) 01/01/2024    ALKPHOS 97 01/01/2024    BILITOT 0.8 01/01/2024                      Problem List Items Addressed This Visit             ICD-10-CM    * (Principal) Alcohol withdrawal syndrome, uncomplicated (CMS/[facility]) - Primary F10.930     Other Visit Diagnoses         Codes    Chest pain, unspecified type     R07.9    Other specified diabetes mellitus with other specified complication, without long-term current use of insulin (CMS/[facility])     E13.69    Transaminitis     R74.01    Alcohol abuse     F10.10          Wean off CIWA  Recheck electrolytes  PT OT  Monitor platelet count  Other labs have been reviewed              David Chadwick MD  This note was created using [employer]. Any inadvertent grammar, spelling or syntax errors are do to voice recognition software error and are not intentional.      "

## 2024-01-02 LAB
ALBUMIN SERPL BCP-MCNC: 4.1 G/DL (ref 3.4–5)
ALP SERPL-CCNC: 87 U/L (ref 33–120)
ALT SERPL W P-5'-P-CCNC: 139 U/L (ref 10–52)
ANION GAP SERPL CALC-SCNC: 13 MMOL/L (ref 10–20)
AST SERPL W P-5'-P-CCNC: 86 U/L (ref 9–39)
BILIRUB SERPL-MCNC: 0.7 MG/DL (ref 0–1.2)
BUN SERPL-MCNC: 26 MG/DL (ref 6–23)
CALCIUM SERPL-MCNC: 9.6 MG/DL (ref 8.6–10.3)
CHLORIDE SERPL-SCNC: 99 MMOL/L (ref 98–107)
CO2 SERPL-SCNC: 25 MMOL/L (ref 21–32)
CREAT SERPL-MCNC: 1.04 MG/DL (ref 0.5–1.3)
ERYTHROCYTE [DISTWIDTH] IN BLOOD BY AUTOMATED COUNT: 12.9 % (ref 11.5–14.5)
GFR SERPL CREATININE-BSD FRML MDRD: >90 ML/MIN/1.73M*2
GLUCOSE SERPL-MCNC: 122 MG/DL (ref 74–99)
HCT VFR BLD AUTO: 49.7 % (ref 41–52)
HGB BLD-MCNC: 17.1 G/DL (ref 13.5–17.5)
MCH RBC QN AUTO: 35.3 PG (ref 26–34)
MCHC RBC AUTO-ENTMCNC: 34.4 G/DL (ref 32–36)
MCV RBC AUTO: 103 FL (ref 80–100)
NRBC BLD-RTO: 0 /100 WBCS (ref 0–0)
PLATELET # BLD AUTO: 267 X10*3/UL (ref 150–450)
POTASSIUM SERPL-SCNC: 3.6 MMOL/L (ref 3.5–5.3)
PROT SERPL-MCNC: 7.7 G/DL (ref 6.4–8.2)
RBC # BLD AUTO: 4.84 X10*6/UL (ref 4.5–5.9)
SODIUM SERPL-SCNC: 133 MMOL/L (ref 136–145)
WBC # BLD AUTO: 9.1 X10*3/UL (ref 4.4–11.3)

## 2024-01-02 PROCEDURE — 36415 COLL VENOUS BLD VENIPUNCTURE: CPT | Performed by: NURSE PRACTITIONER

## 2024-01-02 PROCEDURE — 2500000001 HC RX 250 WO HCPCS SELF ADMINISTERED DRUGS (ALT 637 FOR MEDICARE OP): Performed by: NURSE PRACTITIONER

## 2024-01-02 PROCEDURE — 85027 COMPLETE CBC AUTOMATED: CPT | Performed by: NURSE PRACTITIONER

## 2024-01-02 PROCEDURE — 2500000004 HC RX 250 GENERAL PHARMACY W/ HCPCS (ALT 636 FOR OP/ED): Performed by: NURSE PRACTITIONER

## 2024-01-02 PROCEDURE — 2500000002 HC RX 250 W HCPCS SELF ADMINISTERED DRUGS (ALT 637 FOR MEDICARE OP, ALT 636 FOR OP/ED): Performed by: NURSE PRACTITIONER

## 2024-01-02 PROCEDURE — 2500000004 HC RX 250 GENERAL PHARMACY W/ HCPCS (ALT 636 FOR OP/ED): Performed by: PSYCHIATRY & NEUROLOGY

## 2024-01-02 PROCEDURE — 2500000002 HC RX 250 W HCPCS SELF ADMINISTERED DRUGS (ALT 637 FOR MEDICARE OP, ALT 636 FOR OP/ED): Performed by: PHYSICIAN ASSISTANT

## 2024-01-02 PROCEDURE — 99232 SBSQ HOSP IP/OBS MODERATE 35: CPT | Performed by: PSYCHIATRY & NEUROLOGY

## 2024-01-02 PROCEDURE — 80053 COMPREHEN METABOLIC PANEL: CPT | Performed by: NURSE PRACTITIONER

## 2024-01-02 PROCEDURE — 96372 THER/PROPH/DIAG INJ SC/IM: CPT | Performed by: NURSE PRACTITIONER

## 2024-01-02 PROCEDURE — S4991 NICOTINE PATCH NONLEGEND: HCPCS | Performed by: NURSE PRACTITIONER

## 2024-01-02 PROCEDURE — 1200000002 HC GENERAL ROOM WITH TELEMETRY DAILY

## 2024-01-02 RX ORDER — LORAZEPAM 1 MG/1
2 TABLET ORAL EVERY 2 HOUR PRN
Status: DISCONTINUED | OUTPATIENT
Start: 2024-01-02 | End: 2024-01-06

## 2024-01-02 RX ORDER — LORAZEPAM 0.5 MG/1
0.5 TABLET ORAL EVERY 2 HOUR PRN
Status: DISCONTINUED | OUTPATIENT
Start: 2024-01-02 | End: 2024-01-06

## 2024-01-02 RX ORDER — LANOLIN ALCOHOL/MO/W.PET/CERES
100 CREAM (GRAM) TOPICAL DAILY
Status: DISCONTINUED | OUTPATIENT
Start: 2024-01-02 | End: 2024-01-07 | Stop reason: HOSPADM

## 2024-01-02 RX ORDER — DIAZEPAM 5 MG/1
5 TABLET ORAL DAILY
Status: DISCONTINUED | OUTPATIENT
Start: 2024-01-03 | End: 2024-01-07 | Stop reason: HOSPADM

## 2024-01-02 RX ORDER — LORAZEPAM 1 MG/1
1 TABLET ORAL EVERY 2 HOUR PRN
Status: DISCONTINUED | OUTPATIENT
Start: 2024-01-02 | End: 2024-01-06

## 2024-01-02 RX ORDER — LORAZEPAM 2 MG/ML
2 INJECTION INTRAMUSCULAR ONCE
Status: COMPLETED | OUTPATIENT
Start: 2024-01-02 | End: 2024-01-02

## 2024-01-02 RX ADMIN — LORAZEPAM 0.5 MG: 2 INJECTION, SOLUTION INTRAMUSCULAR; INTRAVENOUS at 01:59

## 2024-01-02 RX ADMIN — AMLODIPINE BESYLATE 10 MG: 10 TABLET ORAL at 08:34

## 2024-01-02 RX ADMIN — ENOXAPARIN SODIUM 40 MG: 40 INJECTION SUBCUTANEOUS at 12:18

## 2024-01-02 RX ADMIN — LORAZEPAM 2 MG: 2 INJECTION, SOLUTION INTRAMUSCULAR; INTRAVENOUS at 10:40

## 2024-01-02 RX ADMIN — QUETIAPINE FUMARATE 25 MG: 25 TABLET ORAL at 20:13

## 2024-01-02 RX ADMIN — LORAZEPAM 2 MG: 1 TABLET ORAL at 22:12

## 2024-01-02 RX ADMIN — BUPRENORPHINE AND NALOXONE 2 TABLET: 8; 2 TABLET SUBLINGUAL at 08:34

## 2024-01-02 RX ADMIN — VENLAFAXINE HYDROCHLORIDE 187.5 MG: 75 CAPSULE, EXTENDED RELEASE ORAL at 08:34

## 2024-01-02 RX ADMIN — ONDANSETRON 4 MG: 2 INJECTION INTRAMUSCULAR; INTRAVENOUS at 18:18

## 2024-01-02 RX ADMIN — GABAPENTIN 600 MG: 300 CAPSULE ORAL at 20:13

## 2024-01-02 RX ADMIN — Medication 1 TABLET: at 08:34

## 2024-01-02 RX ADMIN — DIAZEPAM 5 MG: 5 TABLET ORAL at 08:34

## 2024-01-02 RX ADMIN — LOSARTAN POTASSIUM: 100 TABLET, FILM COATED ORAL at 08:34

## 2024-01-02 RX ADMIN — LORAZEPAM 2 MG: 1 TABLET ORAL at 18:18

## 2024-01-02 RX ADMIN — THIAMINE HCL TAB 100 MG 100 MG: 100 TAB at 08:34

## 2024-01-02 RX ADMIN — GABAPENTIN 600 MG: 300 CAPSULE ORAL at 08:34

## 2024-01-02 RX ADMIN — PANTOPRAZOLE SODIUM 40 MG: 40 TABLET, DELAYED RELEASE ORAL at 06:11

## 2024-01-02 RX ADMIN — LORAZEPAM 0.5 MG: 2 INJECTION, SOLUTION INTRAMUSCULAR; INTRAVENOUS at 06:15

## 2024-01-02 RX ADMIN — LORAZEPAM 2 MG: 2 INJECTION, SOLUTION INTRAMUSCULAR; INTRAVENOUS at 08:33

## 2024-01-02 RX ADMIN — ONDANSETRON 4 MG: 2 INJECTION INTRAMUSCULAR; INTRAVENOUS at 08:33

## 2024-01-02 RX ADMIN — FOLIC ACID 1 MG: 1 TABLET ORAL at 08:34

## 2024-01-02 RX ADMIN — NICOTINE 1 PATCH: 14 PATCH, EXTENDED RELEASE TRANSDERMAL at 08:35

## 2024-01-02 RX ADMIN — GABAPENTIN 600 MG: 300 CAPSULE ORAL at 14:48

## 2024-01-02 RX ADMIN — LORAZEPAM 1 MG: 1 TABLET ORAL at 20:13

## 2024-01-02 RX ADMIN — LORAZEPAM 1 MG: 2 INJECTION, SOLUTION INTRAMUSCULAR; INTRAVENOUS at 12:19

## 2024-01-02 RX ADMIN — LORAZEPAM 2 MG: 2 INJECTION, SOLUTION INTRAMUSCULAR; INTRAVENOUS at 14:17

## 2024-01-02 ASSESSMENT — COGNITIVE AND FUNCTIONAL STATUS - GENERAL
DAILY ACTIVITIY SCORE: 24
MOBILITY SCORE: 24
DAILY ACTIVITIY SCORE: 24
MOBILITY SCORE: 24

## 2024-01-02 ASSESSMENT — LIFESTYLE VARIABLES
AUDITORY DISTURBANCES: NOT PRESENT
ANXIETY: 3
ANXIETY: MILDLY ANXIOUS
AGITATION: NORMAL ACTIVITY
VISUAL DISTURBANCES: NOT PRESENT
TOTAL SCORE: 12
AUDITORY DISTURBANCES: NOT PRESENT
HEADACHE, FULLNESS IN HEAD: MILD
TREMOR: NOT VISIBLE, BUT CAN BE FELT FINGERTIP TO FINGERTIP
HEADACHE, FULLNESS IN HEAD: MILD
PAROXYSMAL SWEATS: 2
NAUSEA AND VOMITING: 2
ORIENTATION AND CLOUDING OF SENSORIUM: ORIENTED AND CAN DO SERIAL ADDITIONS
TREMOR: NO TREMOR
VISUAL DISTURBANCES: NOT PRESENT
ORIENTATION AND CLOUDING OF SENSORIUM: ORIENTED AND CAN DO SERIAL ADDITIONS
TOTAL SCORE: 4
AUDITORY DISTURBANCES: NOT PRESENT
NAUSEA AND VOMITING: 2
PAROXYSMAL SWEATS: NO SWEAT VISIBLE
AGITATION: NORMAL ACTIVITY
AGITATION: MODERATELY FIDGETY AND RESTLESS
AGITATION: NORMAL ACTIVITY
PULSE: 102
TREMOR: NOT VISIBLE, BUT CAN BE FELT FINGERTIP TO FINGERTIP
PULSE: 107
ANXIETY: 2
NAUSEA AND VOMITING: NO NAUSEA AND NO VOMITING
ORIENTATION AND CLOUDING OF SENSORIUM: ORIENTED AND CAN DO SERIAL ADDITIONS
HEADACHE, FULLNESS IN HEAD: MILD
TOTAL SCORE: 9
NAUSEA AND VOMITING: NO NAUSEA AND NO VOMITING
ORIENTATION AND CLOUDING OF SENSORIUM: ORIENTED AND CAN DO SERIAL ADDITIONS
TACTILE DISTURBANCES: MILD ITCHING, PINS AND NEEDLES, BURNING OR NUMBNESS
ORIENTATION AND CLOUDING OF SENSORIUM: ORIENTED AND CAN DO SERIAL ADDITIONS
TOTAL SCORE: 10
TREMOR: NOT VISIBLE, BUT CAN BE FELT FINGERTIP TO FINGERTIP
AGITATION: SOMEWHAT MORE THAN NORMAL ACTIVITY
TREMOR: NOT VISIBLE, BUT CAN BE FELT FINGERTIP TO FINGERTIP
BLOOD PRESSURE: 107/68
VISUAL DISTURBANCES: NOT PRESENT
NAUSEA AND VOMITING: INTERMITTENT NAUSEA WITH DRY HEAVES
ORIENTATION AND CLOUDING OF SENSORIUM: ORIENTED AND CAN DO SERIAL ADDITIONS
HEADACHE, FULLNESS IN HEAD: MODERATELY SEVERE
TOTAL SCORE: 6
ANXIETY: 3
PULSE: 102
VISUAL DISTURBANCES: NOT PRESENT
TOTAL SCORE: 0
HEADACHE, FULLNESS IN HEAD: MILD
ORIENTATION AND CLOUDING OF SENSORIUM: ORIENTED AND CAN DO SERIAL ADDITIONS
ANXIETY: 5
TOTAL SCORE: 2
HEADACHE, FULLNESS IN HEAD: VERY MILD
PULSE: 112
TOTAL SCORE: 15
TREMOR: NOT VISIBLE, BUT CAN BE FELT FINGERTIP TO FINGERTIP
BLOOD PRESSURE: 122/89
ORIENTATION AND CLOUDING OF SENSORIUM: ORIENTED AND CAN DO SERIAL ADDITIONS
AUDITORY DISTURBANCES: NOT PRESENT
AGITATION: NORMAL ACTIVITY
PAROXYSMAL SWEATS: 3
VISUAL DISTURBANCES: NOT PRESENT
VISUAL DISTURBANCES: NOT PRESENT
TOTAL SCORE: 17
VISUAL DISTURBANCES: NOT PRESENT
BLOOD PRESSURE: 133/92
TREMOR: NO TREMOR
TACTILE DISTURBANCES: MILD ITCHING, PINS AND NEEDLES, BURNING OR NUMBNESS
AUDITORY DISTURBANCES: NOT PRESENT
TREMOR: NOT VISIBLE, BUT CAN BE FELT FINGERTIP TO FINGERTIP
AGITATION: NORMAL ACTIVITY
TACTILE DISTURBANCES: VERY MILD ITCHING, PINS AND NEEDLES, BURNING OR NUMBNESS
PAROXYSMAL SWEATS: 2
PULSE: 90
AGITATION: NORMAL ACTIVITY
AUDITORY DISTURBANCES: NOT PRESENT
BLOOD PRESSURE: 123/81
TREMOR: 2
NAUSEA AND VOMITING: 2
NAUSEA AND VOMITING: NO NAUSEA AND NO VOMITING
HEADACHE, FULLNESS IN HEAD: NOT PRESENT
VISUAL DISTURBANCES: NOT PRESENT
ORIENTATION AND CLOUDING OF SENSORIUM: ORIENTED AND CAN DO SERIAL ADDITIONS
ORIENTATION AND CLOUDING OF SENSORIUM: ORIENTED AND CAN DO SERIAL ADDITIONS
ANXIETY: NO ANXIETY, AT EASE
ANXIETY: 2
AUDITORY DISTURBANCES: NOT PRESENT
AUDITORY DISTURBANCES: NOT PRESENT
TOTAL SCORE: 9
VISUAL DISTURBANCES: NOT PRESENT
BLOOD PRESSURE: 115/79
VISUAL DISTURBANCES: NOT PRESENT
NAUSEA AND VOMITING: NO NAUSEA AND NO VOMITING
AUDITORY DISTURBANCES: NOT PRESENT
ORIENTATION AND CLOUDING OF SENSORIUM: ORIENTED AND CAN DO SERIAL ADDITIONS
AUDITORY DISTURBANCES: NOT PRESENT
ANXIETY: MODERATELY ANXIOUS, OR GUARDED, SO ANXIETY IS INFERRED
HEADACHE, FULLNESS IN HEAD: MODERATE
AGITATION: NORMAL ACTIVITY
ANXIETY: MILDLY ANXIOUS
ANXIETY: NO ANXIETY, AT EASE
NAUSEA AND VOMITING: NO NAUSEA AND NO VOMITING
TREMOR: NO TREMOR
TOTAL SCORE: 17
BLOOD PRESSURE: 141/83
ANXIETY: 2
AUDITORY DISTURBANCES: NOT PRESENT
HEADACHE, FULLNESS IN HEAD: NOT PRESENT
PAROXYSMAL SWEATS: 2
PULSE: 107
TACTILE DISTURBANCES: MILD ITCHING, PINS AND NEEDLES, BURNING OR NUMBNESS
NAUSEA AND VOMITING: MILD NAUSEA WITH NO VOMITING
TACTILE DISTURBANCES: VERY MILD ITCHING, PINS AND NEEDLES, BURNING OR NUMBNESS
PAROXYSMAL SWEATS: 2
TOTAL SCORE: 7
NAUSEA AND VOMITING: NO NAUSEA AND NO VOMITING
PAROXYSMAL SWEATS: 2
HEADACHE, FULLNESS IN HEAD: MILD
AUDITORY DISTURBANCES: NOT PRESENT
PAROXYSMAL SWEATS: 2
PAROXYSMAL SWEATS: 2
AGITATION: 2
AGITATION: NORMAL ACTIVITY
TREMOR: NO TREMOR
PAROXYSMAL SWEATS: BARELY PERCEPTIBLE SWEATING, PALMS MOIST
VISUAL DISTURBANCES: NOT PRESENT
VISUAL DISTURBANCES: NOT PRESENT
ORIENTATION AND CLOUDING OF SENSORIUM: ORIENTED AND CAN DO SERIAL ADDITIONS
ANXIETY: NO ANXIETY, AT EASE
AGITATION: SOMEWHAT MORE THAN NORMAL ACTIVITY
HEADACHE, FULLNESS IN HEAD: MILD
HEADACHE, FULLNESS IN HEAD: MILD
TREMOR: NOT VISIBLE, BUT CAN BE FELT FINGERTIP TO FINGERTIP
TACTILE DISTURBANCES: MILD ITCHING, PINS AND NEEDLES, BURNING OR NUMBNESS
PAROXYSMAL SWEATS: 2
NAUSEA AND VOMITING: MILD NAUSEA WITH NO VOMITING
PAROXYSMAL SWEATS: BEADS OF SWEAT OBVIOUS ON FOREHEAD

## 2024-01-02 ASSESSMENT — PAIN SCALES - GENERAL
PAINLEVEL_OUTOF10: 4
PAINLEVEL_OUTOF10: 0 - NO PAIN
PAINLEVEL_OUTOF10: 2
PAINLEVEL_OUTOF10: 0 - NO PAIN

## 2024-01-02 ASSESSMENT — PAIN - FUNCTIONAL ASSESSMENT
PAIN_FUNCTIONAL_ASSESSMENT: 0-10
PAIN_FUNCTIONAL_ASSESSMENT: 0-10

## 2024-01-02 ASSESSMENT — ACTIVITIES OF DAILY LIVING (ADL): LACK_OF_TRANSPORTATION: NO

## 2024-01-02 NOTE — PROGRESS NOTES
"1/2/24 1000 Met with patient at the bedside. Confirmed address and contacts. Patient lives at home alone and states he is independent with all care, ADLs, drives. Without device. PCP is Dr Blu Rodriguez. States he was due for appt. With him and missed it due to this hospitalization. States he obtains and affords his meds without any issues. States he drinks alcohol because of his mental health (anxiety and depression). States he does not get sydnie from it and \"I drink until I pass out because it numbs all my problems and mental health issues.\" States he started about a year ago. Asked if he was interested in any resources to help quitting and he is agreeable. Luis F SAINI updated to provide patient resources. Will follow. Payton Lovell RN TCC   "

## 2024-01-02 NOTE — PROGRESS NOTES
"Deyvi Araujo is a 43 y.o. male on day 4 of admission presenting with Alcohol withdrawal syndrome, uncomplicated (CMS/HCC).    SUBJECTIVE:  Patient  has mild withdrawal symptoms from alcohol use.  He rates his mood to be 5 out of 10 with 10 being bad.  Patient denies any suicidal thoughts.  He has been feeling helpless but denies any hopeless or worthlessness.  He wants to get better for the sake of his children and get back to work.  Patient is agreeable to do outpatient program for chemical dependency treatment.  He is interested to see a psychiatrist on discharge.  I recommend patient to be given contact details about Dr. Olivier's office so the patient can follow-up    Exam:  Vital Signs: /89   Pulse 102   Temp 36.2 °C (97.2 °F)   Resp 18   Ht 1.88 m (6' 2\")   Wt 132 kg (291 lb 7.2 oz)   SpO2 96%   BMI 37.42 kg/m²   Musculoskeletal:  tremor  Gait/Station: normal      Mental Status Exam:  General Appearance: {BH Psych Appearance:[id number]  Speech: soft  Mood: depressed  Affect: appropriate  Thought Process: Linear, goal directed  Thought Associations: No loosening of associations  Thought Content: normal  Perception: No perceptual abnormalities noted  Level of Consciousness: Alert  Orientation: Alert and oriented to person, place, time and situation  Attention and Concentration: Mild impairment in attention  Cognition: Intact  Insight: Fair  Judgment: Fair     Results for orders placed or performed during the hospital encounter of 12/29/23 (from the past 96 hour(s))   Vitamin D 25-Hydroxy,Total (for eval of Vitamin D levels)   Result Value Ref Range    Vitamin D, 25-Hydroxy, Total 21 (L) 30 - 100 ng/mL   CBC   Result Value Ref Range    WBC 6.3 4.4 - 11.3 x10*3/uL    nRBC 0.0 0.0 - 0.0 /100 WBCs    RBC 3.98 (L) 4.50 - 5.90 x10*6/uL    Hemoglobin 13.9 13.5 - 17.5 g/dL    Hematocrit 41.6 41.0 - 52.0 %     (H) 80 - 100 fL    MCH 34.9 (H) 26.0 - 34.0 pg    MCHC 33.4 32.0 - 36.0 g/dL    RDW 13.0 " 11.5 - 14.5 %    Platelets 170 150 - 450 x10*3/uL   Vitamin B12   Result Value Ref Range    Vitamin B12 423 211 - 911 pg/mL   Comprehensive metabolic panel   Result Value Ref Range    Glucose 113 (H) 74 - 99 mg/dL    Sodium 135 (L) 136 - 145 mmol/L    Potassium 3.7 3.5 - 5.3 mmol/L    Chloride 100 98 - 107 mmol/L    Bicarbonate 25 21 - 32 mmol/L    Anion Gap 14 10 - 20 mmol/L    Urea Nitrogen 11 6 - 23 mg/dL    Creatinine 0.75 0.50 - 1.30 mg/dL    eGFR >90 >60 mL/min/1.73m*2    Calcium 8.6 8.6 - 10.3 mg/dL    Albumin 3.4 3.4 - 5.0 g/dL    Alkaline Phosphatase 73 33 - 120 U/L    Total Protein 6.5 6.4 - 8.2 g/dL    AST 83 (H) 9 - 39 U/L    Bilirubin, Total 0.6 0.0 - 1.2 mg/dL     (H) 10 - 52 U/L   CBC   Result Value Ref Range    WBC 6.3 4.4 - 11.3 x10*3/uL    nRBC 0.0 0.0 - 0.0 /100 WBCs    RBC 4.37 (L) 4.50 - 5.90 x10*6/uL    Hemoglobin 15.4 13.5 - 17.5 g/dL    Hematocrit 44.5 41.0 - 52.0 %     (H) 80 - 100 fL    MCH 35.2 (H) 26.0 - 34.0 pg    MCHC 34.6 32.0 - 36.0 g/dL    RDW 12.6 11.5 - 14.5 %    Platelets 208 150 - 450 x10*3/uL   Comprehensive metabolic panel   Result Value Ref Range    Glucose 104 (H) 74 - 99 mg/dL    Sodium 133 (L) 136 - 145 mmol/L    Potassium 3.5 3.5 - 5.3 mmol/L    Chloride 98 98 - 107 mmol/L    Bicarbonate 27 21 - 32 mmol/L    Anion Gap 12 10 - 20 mmol/L    Urea Nitrogen 11 6 - 23 mg/dL    Creatinine 0.71 0.50 - 1.30 mg/dL    eGFR >90 >60 mL/min/1.73m*2    Calcium 9.3 8.6 - 10.3 mg/dL    Albumin 3.9 3.4 - 5.0 g/dL    Alkaline Phosphatase 89 33 - 120 U/L    Total Protein 7.4 6.4 - 8.2 g/dL    AST 89 (H) 9 - 39 U/L    Bilirubin, Total 0.9 0.0 - 1.2 mg/dL     (H) 10 - 52 U/L   CBC   Result Value Ref Range    WBC 9.6 4.4 - 11.3 x10*3/uL    nRBC 0.0 0.0 - 0.0 /100 WBCs    RBC 4.95 4.50 - 5.90 x10*6/uL    Hemoglobin 17.7 (H) 13.5 - 17.5 g/dL    Hematocrit 49.9 41.0 - 52.0 %     (H) 80 - 100 fL    MCH 35.8 (H) 26.0 - 34.0 pg    MCHC 35.5 32.0 - 36.0 g/dL    RDW 12.9 11.5  - 14.5 %    Platelets 252 150 - 450 x10*3/uL   Comprehensive metabolic panel   Result Value Ref Range    Glucose 123 (H) 74 - 99 mg/dL    Sodium 132 (L) 136 - 145 mmol/L    Potassium 3.8 3.5 - 5.3 mmol/L    Chloride 97 (L) 98 - 107 mmol/L    Bicarbonate 24 21 - 32 mmol/L    Anion Gap 15 10 - 20 mmol/L    Urea Nitrogen 16 6 - 23 mg/dL    Creatinine 0.89 0.50 - 1.30 mg/dL    eGFR >90 >60 mL/min/1.73m*2    Calcium 10.0 8.6 - 10.3 mg/dL    Albumin 4.2 3.4 - 5.0 g/dL    Alkaline Phosphatase 97 33 - 120 U/L    Total Protein 8.2 6.4 - 8.2 g/dL    AST 82 (H) 9 - 39 U/L    Bilirubin, Total 0.8 0.0 - 1.2 mg/dL     (H) 10 - 52 U/L   CBC   Result Value Ref Range    WBC 9.1 4.4 - 11.3 x10*3/uL    nRBC 0.0 0.0 - 0.0 /100 WBCs    RBC 4.84 4.50 - 5.90 x10*6/uL    Hemoglobin 17.1 13.5 - 17.5 g/dL    Hematocrit 49.7 41.0 - 52.0 %     (H) 80 - 100 fL    MCH 35.3 (H) 26.0 - 34.0 pg    MCHC 34.4 32.0 - 36.0 g/dL    RDW 12.9 11.5 - 14.5 %    Platelets 267 150 - 450 x10*3/uL   Comprehensive metabolic panel   Result Value Ref Range    Glucose 122 (H) 74 - 99 mg/dL    Sodium 133 (L) 136 - 145 mmol/L    Potassium 3.6 3.5 - 5.3 mmol/L    Chloride 99 98 - 107 mmol/L    Bicarbonate 25 21 - 32 mmol/L    Anion Gap 13 10 - 20 mmol/L    Urea Nitrogen 26 (H) 6 - 23 mg/dL    Creatinine 1.04 0.50 - 1.30 mg/dL    eGFR >90 >60 mL/min/1.73m*2    Calcium 9.6 8.6 - 10.3 mg/dL    Albumin 4.1 3.4 - 5.0 g/dL    Alkaline Phosphatase 87 33 - 120 U/L    Total Protein 7.7 6.4 - 8.2 g/dL    AST 86 (H) 9 - 39 U/L    Bilirubin, Total 0.7 0.0 - 1.2 mg/dL     (H) 10 - 52 U/L         Impression: Alcohol dependence severe  Substance-induced mood disorder  MDD recurrent       Recommendations:    Impression:    Medication reviewed.  Continue CIWA protocol  Taper and stop the Valium  Effexor .5 mg  Continue Seroquel  Patient is not at imminent risk of suicide.  Patient will be benefiting from chemical dependency rehabilitation treatment.    Consult  to discuss with the patient about chemical dependency treatment options and OP psychiatry treatment  DC from psych standpoint when medically stable      Thank you for allowing us to participate in the care of this patient.       Brown Babb MD  1/2/2024  4:03 PM

## 2024-01-02 NOTE — PROGRESS NOTES
MRN: [id number]  SERVICE DATE:  1/2/2024   SERVICE TIME:  8:55 AM  Today's Date: 01/02/24  Admission Date: 12/29/2023  Hospital Day: #4    Subjective   Patient seen today resting comfortably.  Still on CIWA Case discussed with nursing staff           Objective      ROS:   General: Denies any change in weight, fever, chills, fatigue.   Head and Neck: Denies any headaches, syncope or history of head injury.   Eyes/Ears: Denies any cataracts, glaucoma, blurred vision, tinnitus.   Nose: Denies any epistaxis or sinus problems   Respiratory: Denies any cough, hemoptysis, wheezing, asthma, dyspnea.   Cardiovascular: No orthopnea, dyspnea, palpitations, congestive heart failure.   Gastrointestinal: Denies any indigestion, nausea, vomiting, diarrhea, constipation.   Neuro: Intermittent confusion     MEDICATIONS:  Current Facility-Administered Medications   Medication Dose Route Frequency Provider Last Rate Last Admin    amLODIPine (Norvasc) tablet 10 mg  10 mg oral Daily DIMITRI Gibson-CNP   10 mg at 01/02/24 0834    buprenorphine-naloxone (Suboxone) 8-2 mg per SL tablet 2 tablet  2 tablet sublingual Daily DIMITRI Cohen-CNP   2 tablet at 01/02/24 0834    diazePAM (Valium) tablet 5 mg  5 mg oral q12h Thania Jackson PA-C   5 mg at 01/02/24 0834    enoxaparin (Lovenox) syringe 40 mg  40 mg subcutaneous q24h Nikky Mei APRN-CNP   40 mg at 01/01/24 1111    folic acid (Folvite) tablet 1 mg  1 mg oral Daily DIMITRI Gibson-CNP   1 mg at 01/02/24 0834    gabapentin (Neurontin) tablet 600 mg  600 mg oral TID Nikky Mei APRN-CNP   600 mg at 01/02/24 0834    lactated Ringer's bolus 1,000 mL  1,000 mL intravenous Once Norman Soto PA-C 76.9 mL/hr at 01/02/24 0616 Rate Verify at 01/02/24 0616    LORazepam (Ativan) injection 0.5 mg  0.5 mg intravenous q2h PRN DIMITRI Gibson-CNP   0.5 mg at 01/02/24 0615    Or    LORazepam (Ativan) injection 1 mg  1 mg intravenous q2h PRN Nikky Mei,  "APRN-CNP   1 mg at 01/01/24 0621    Or    LORazepam (Ativan) injection 2 mg  2 mg intravenous q2h PRN Nikky Mei APRN-CNP   2 mg at 01/02/24 0833    losartan 100 mg, hydroCHLOROthiazide 12.5 mg for Hyzaar 100/12.5   oral Daily Nikky Mei APRN-CNP   Given at 01/02/24 0834    multivitamin with minerals 1 tablet  1 tablet oral Daily Nikky Mei APRN-CNP   1 tablet at 01/02/24 0834    nicotine (Nicoderm CQ) 14 mg/24 hr patch 1 patch  1 patch transdermal Daily Nikky Mei APRN-CNP   1 patch at 01/02/24 0835    Followed by    [START ON 2/9/2024] nicotine (Nicoderm CQ) 7 mg/24 hr patch 1 patch  1 patch transdermal Daily Nikky Mei APRN-CNP        ondansetron (Zofran) injection 4 mg  4 mg intravenous q6h PRN Nikky Mei APRN-CNP   4 mg at 01/02/24 0833    pantoprazole (ProtoNix) EC tablet 40 mg  40 mg oral Daily before breakfast Nikky Mei APRN-CNP   40 mg at 01/02/24 0611    polyethylene glycol (Glycolax, Miralax) packet 17 g  17 g oral Daily PRN Nikky Mei APRN-CNP        QUEtiapine (SEROquel) tablet 25 mg  25 mg oral Nightly Nikky Mei APRN-CNP   25 mg at 01/01/24 2016    thiamine (Vitamin B-1) tablet 100 mg  100 mg oral Daily Yvonne Wakefield APRN-CNP   100 mg at 01/02/24 0834    venlafaxine XR (Effexor-XR) 24 hr capsule 187.5 mg  187.5 mg oral Daily Brown Babb MD   187.5 mg at 01/02/24 0834         PHYSICAL EXAM:   /79   Pulse 90   Temp 36.3 °C (97.3 °F)   Resp 18   Ht 1.88 m (6' 2\")   Wt 132 kg (291 lb 7.2 oz)   SpO2 95%   BMI 37.42 kg/m²      CONSTITUTIONAL - well nourished, well developed, looks like stated age, in no acute distress, not ill-appearing   SKIN - normal skin color and pigmentation, normal skin turgor without rash   HEAD - no trauma, normocephalic  EYES - pupils are equal and reactive to light, extraocular muscles are intact, and normal external exam  ENT - TM's intact, no injection, no signs of infection, uvula midline, normal tongue " "movement and throat normal  NECK - supple without rigidity, no neck mass was observed, no thyromegaly    CHEST - clear to auscultation, no wheezing, no crackles and no rales, good effort  CARDIAC - regular rate and regular rhythm, no skipped beats, no murmur  ABDOMEN - no organomegaly, soft, nontender, nondistended, normal bowel sounds   NEUROLOGICAL - normal gait, normal balance, normal motor, no ataxia, DTRs equal and symmetrical; alert, oriented and no focal signs  PSYCHIATRIC - alert, pleasant and cordial, age-appropriate  IMMUNOLOGIC - no cervical lymphadenopathy       Labs:  Lab Results   Component Value Date    WBC 9.1 01/02/2024    HGB 17.1 01/02/2024    HCT 49.7 01/02/2024     (H) 01/02/2024     01/02/2024     Lab Results   Component Value Date    GLUCOSE 122 (H) 01/02/2024    CALCIUM 9.6 01/02/2024     (L) 01/02/2024    K 3.6 01/02/2024    CO2 25 01/02/2024    CL 99 01/02/2024    BUN 26 (H) 01/02/2024    CREATININE 1.04 01/02/2024   ESR: --No results found for: \"SEDRATE\"No results found for: \"CRP\"  Lab Results   Component Value Date     (H) 01/02/2024    AST 86 (H) 01/02/2024    ALKPHOS 87 01/02/2024    BILITOT 0.7 01/02/2024                      Problem List Items Addressed This Visit             ICD-10-CM    * (Principal) Alcohol withdrawal syndrome, uncomplicated (CMS/[facility]) - Primary F10.930     Other Visit Diagnoses         Codes    Chest pain, unspecified type     R07.9    Other specified diabetes mellitus with other specified complication, without long-term current use of insulin (CMS/[facility])     E13.69    Transaminitis     R74.01    Alcohol abuse     F10.10          Continue meds  CIWA protocol  Recheck electrolytes  DVT prophylaxis  Increase activity as tolerated              David Chadwick MD  This note was created using Insight [employer]. Any inadvertent grammar, spelling or syntax errors are do to voice recognition software error and are not intentional.      "

## 2024-01-02 NOTE — CARE PLAN
Problem: Fall/Injury  Goal: Not fall by end of shift  Outcome: Progressing     Problem: Diabetes  Goal: No changes in neurological exam by end of shift  Outcome: Progressing     Problem: Pain - Adult  Goal: Verbalizes/displays adequate comfort level or baseline comfort level  Outcome: Progressing     Problem: Safety - Adult  Goal: Free from fall injury  Outcome: Progressing   The patient's goals for the shift include      The clinical goals for the shift include see care plan    Patient continuing to go through alcohol withdrawal. Patient CIWA scoring anywhere between 0-13. Patient scoring with tremors, headache, sweating, anxiety, and nausea and vomiting. Patient reports feeling better after ativan administration. Patient complained of feeling dehydrated which was also alleviated with LR infusion.

## 2024-01-02 NOTE — PROGRESS NOTES
Spiritual Care Visit    Clinical Encounter Type  Visited With: Patient  Routine Visit: Introduction  Continue Visiting: No                                            Taxonomy  Intended Effects: Preserve dignity and respect, Promote sense of peace  Methods: Offer support  Interventions: Share words of hope and inspiration    Patient talked about the troubles of withdrawal.   listened carefully and was present with the patient.   encouraged the patient to keep pressing on toward good things in life and spoke encouraging words.

## 2024-01-03 LAB
ALBUMIN SERPL BCP-MCNC: 4.1 G/DL (ref 3.4–5)
ALP SERPL-CCNC: 82 U/L (ref 33–120)
ALT SERPL W P-5'-P-CCNC: 129 U/L (ref 10–52)
ANION GAP SERPL CALC-SCNC: 14 MMOL/L (ref 10–20)
AST SERPL W P-5'-P-CCNC: 75 U/L (ref 9–39)
BILIRUB SERPL-MCNC: 0.7 MG/DL (ref 0–1.2)
BUN SERPL-MCNC: 26 MG/DL (ref 6–23)
CALCIUM SERPL-MCNC: 9.6 MG/DL (ref 8.6–10.3)
CHLORIDE SERPL-SCNC: 97 MMOL/L (ref 98–107)
CO2 SERPL-SCNC: 26 MMOL/L (ref 21–32)
CREAT SERPL-MCNC: 1.17 MG/DL (ref 0.5–1.3)
ERYTHROCYTE [DISTWIDTH] IN BLOOD BY AUTOMATED COUNT: 12.8 % (ref 11.5–14.5)
GFR SERPL CREATININE-BSD FRML MDRD: 79 ML/MIN/1.73M*2
GLUCOSE SERPL-MCNC: 124 MG/DL (ref 74–99)
HCT VFR BLD AUTO: 50.3 % (ref 41–52)
HGB BLD-MCNC: 16.9 G/DL (ref 13.5–17.5)
MAGNESIUM SERPL-MCNC: 2.59 MG/DL (ref 1.6–2.4)
MCH RBC QN AUTO: 34.9 PG (ref 26–34)
MCHC RBC AUTO-ENTMCNC: 33.6 G/DL (ref 32–36)
MCV RBC AUTO: 104 FL (ref 80–100)
NRBC BLD-RTO: 0 /100 WBCS (ref 0–0)
PLATELET # BLD AUTO: 256 X10*3/UL (ref 150–450)
POTASSIUM SERPL-SCNC: 3.8 MMOL/L (ref 3.5–5.3)
PROT SERPL-MCNC: 7.8 G/DL (ref 6.4–8.2)
RBC # BLD AUTO: 4.84 X10*6/UL (ref 4.5–5.9)
SODIUM SERPL-SCNC: 133 MMOL/L (ref 136–145)
WBC # BLD AUTO: 8.6 X10*3/UL (ref 4.4–11.3)

## 2024-01-03 PROCEDURE — 2500000004 HC RX 250 GENERAL PHARMACY W/ HCPCS (ALT 636 FOR OP/ED): Performed by: NURSE PRACTITIONER

## 2024-01-03 PROCEDURE — 2500000002 HC RX 250 W HCPCS SELF ADMINISTERED DRUGS (ALT 637 FOR MEDICARE OP, ALT 636 FOR OP/ED): Performed by: NURSE PRACTITIONER

## 2024-01-03 PROCEDURE — S4991 NICOTINE PATCH NONLEGEND: HCPCS | Performed by: NURSE PRACTITIONER

## 2024-01-03 PROCEDURE — 36415 COLL VENOUS BLD VENIPUNCTURE: CPT | Performed by: NURSE PRACTITIONER

## 2024-01-03 PROCEDURE — 2500000001 HC RX 250 WO HCPCS SELF ADMINISTERED DRUGS (ALT 637 FOR MEDICARE OP): Performed by: NURSE PRACTITIONER

## 2024-01-03 PROCEDURE — 1200000002 HC GENERAL ROOM WITH TELEMETRY DAILY

## 2024-01-03 PROCEDURE — 80053 COMPREHEN METABOLIC PANEL: CPT | Performed by: NURSE PRACTITIONER

## 2024-01-03 PROCEDURE — 85027 COMPLETE CBC AUTOMATED: CPT | Performed by: NURSE PRACTITIONER

## 2024-01-03 PROCEDURE — 2500000001 HC RX 250 WO HCPCS SELF ADMINISTERED DRUGS (ALT 637 FOR MEDICARE OP): Performed by: PSYCHIATRY & NEUROLOGY

## 2024-01-03 PROCEDURE — 2500000004 HC RX 250 GENERAL PHARMACY W/ HCPCS (ALT 636 FOR OP/ED): Performed by: PSYCHIATRY & NEUROLOGY

## 2024-01-03 PROCEDURE — 83735 ASSAY OF MAGNESIUM: CPT | Performed by: NURSE PRACTITIONER

## 2024-01-03 PROCEDURE — 96372 THER/PROPH/DIAG INJ SC/IM: CPT | Performed by: NURSE PRACTITIONER

## 2024-01-03 RX ORDER — POTASSIUM CHLORIDE 20 MEQ/1
20 TABLET, EXTENDED RELEASE ORAL ONCE
Status: COMPLETED | OUTPATIENT
Start: 2024-01-03 | End: 2024-01-03

## 2024-01-03 RX ORDER — IBUPROFEN 400 MG/1
400 TABLET ORAL ONCE
Status: COMPLETED | OUTPATIENT
Start: 2024-01-03 | End: 2024-01-03

## 2024-01-03 RX ADMIN — LORAZEPAM 2 MG: 1 TABLET ORAL at 12:21

## 2024-01-03 RX ADMIN — LORAZEPAM 1 MG: 1 TABLET ORAL at 20:10

## 2024-01-03 RX ADMIN — PANTOPRAZOLE SODIUM 40 MG: 40 TABLET, DELAYED RELEASE ORAL at 06:02

## 2024-01-03 RX ADMIN — FOLIC ACID 1 MG: 1 TABLET ORAL at 08:28

## 2024-01-03 RX ADMIN — LORAZEPAM 0.5 MG: 0.5 TABLET ORAL at 23:47

## 2024-01-03 RX ADMIN — THIAMINE HCL TAB 100 MG 100 MG: 100 TAB at 08:28

## 2024-01-03 RX ADMIN — GABAPENTIN 600 MG: 300 CAPSULE ORAL at 08:28

## 2024-01-03 RX ADMIN — LORAZEPAM 0.5 MG: 0.5 TABLET ORAL at 08:38

## 2024-01-03 RX ADMIN — LOSARTAN POTASSIUM: 100 TABLET, FILM COATED ORAL at 08:27

## 2024-01-03 RX ADMIN — Medication 1 TABLET: at 08:27

## 2024-01-03 RX ADMIN — NICOTINE 1 PATCH: 14 PATCH, EXTENDED RELEASE TRANSDERMAL at 08:26

## 2024-01-03 RX ADMIN — GABAPENTIN 600 MG: 300 CAPSULE ORAL at 14:54

## 2024-01-03 RX ADMIN — QUETIAPINE FUMARATE 25 MG: 25 TABLET ORAL at 19:42

## 2024-01-03 RX ADMIN — BUPRENORPHINE AND NALOXONE 2 TABLET: 8; 2 TABLET SUBLINGUAL at 08:28

## 2024-01-03 RX ADMIN — LORAZEPAM 0.5 MG: 0.5 TABLET ORAL at 06:04

## 2024-01-03 RX ADMIN — VENLAFAXINE HYDROCHLORIDE 187.5 MG: 75 CAPSULE, EXTENDED RELEASE ORAL at 08:27

## 2024-01-03 RX ADMIN — DIAZEPAM 5 MG: 5 TABLET ORAL at 08:27

## 2024-01-03 RX ADMIN — IBUPROFEN 400 MG: 400 TABLET, FILM COATED ORAL at 20:10

## 2024-01-03 RX ADMIN — GABAPENTIN 600 MG: 300 CAPSULE ORAL at 19:42

## 2024-01-03 RX ADMIN — ENOXAPARIN SODIUM 40 MG: 40 INJECTION SUBCUTANEOUS at 11:34

## 2024-01-03 RX ADMIN — AMLODIPINE BESYLATE 10 MG: 10 TABLET ORAL at 08:28

## 2024-01-03 RX ADMIN — POTASSIUM CHLORIDE 20 MEQ: 1500 TABLET, EXTENDED RELEASE ORAL at 11:34

## 2024-01-03 ASSESSMENT — LIFESTYLE VARIABLES
TREMOR: NO TREMOR
AGITATION: NORMAL ACTIVITY
ORIENTATION AND CLOUDING OF SENSORIUM: ORIENTED AND CAN DO SERIAL ADDITIONS
TOTAL SCORE: 4
ORIENTATION AND CLOUDING OF SENSORIUM: ORIENTED AND CAN DO SERIAL ADDITIONS
NAUSEA AND VOMITING: 2
AUDITORY DISTURBANCES: NOT PRESENT
ORIENTATION AND CLOUDING OF SENSORIUM: ORIENTED AND CAN DO SERIAL ADDITIONS
PULSE: 94
VISUAL DISTURBANCES: NOT PRESENT
ANXIETY: MILDLY ANXIOUS
ORIENTATION AND CLOUDING OF SENSORIUM: ORIENTED AND CAN DO SERIAL ADDITIONS
NAUSEA AND VOMITING: NO NAUSEA AND NO VOMITING
BLOOD PRESSURE: 122/60
TOTAL SCORE: 9
AGITATION: NORMAL ACTIVITY
VISUAL DISTURBANCES: NOT PRESENT
ORIENTATION AND CLOUDING OF SENSORIUM: ORIENTED AND CAN DO SERIAL ADDITIONS
NAUSEA AND VOMITING: NO NAUSEA AND NO VOMITING
AUDITORY DISTURBANCES: NOT PRESENT
HEADACHE, FULLNESS IN HEAD: VERY MILD
PAROXYSMAL SWEATS: BARELY PERCEPTIBLE SWEATING, PALMS MOIST
VISUAL DISTURBANCES: NOT PRESENT
VISUAL DISTURBANCES: VERY MILD SENSITIVITY
HEADACHE, FULLNESS IN HEAD: MILD
AUDITORY DISTURBANCES: NOT PRESENT
TOTAL SCORE: 2
VISUAL DISTURBANCES: NOT PRESENT
PAROXYSMAL SWEATS: BARELY PERCEPTIBLE SWEATING, PALMS MOIST
TACTILE DISTURBANCES: VERY MILD ITCHING, PINS AND NEEDLES, BURNING OR NUMBNESS
ORIENTATION AND CLOUDING OF SENSORIUM: ORIENTED AND CAN DO SERIAL ADDITIONS
ORIENTATION AND CLOUDING OF SENSORIUM: ORIENTED AND CAN DO SERIAL ADDITIONS
AUDITORY DISTURBANCES: NOT PRESENT
ANXIETY: 3
TOTAL SCORE: 4
TREMOR: NOT VISIBLE, BUT CAN BE FELT FINGERTIP TO FINGERTIP
ANXIETY: 2
ORIENTATION AND CLOUDING OF SENSORIUM: ORIENTED AND CAN DO SERIAL ADDITIONS
TOTAL SCORE: 10
TREMOR: NO TREMOR
TREMOR: NO TREMOR
PAROXYSMAL SWEATS: 2
PAROXYSMAL SWEATS: BARELY PERCEPTIBLE SWEATING, PALMS MOIST
PULSE: 95
TREMOR: NO TREMOR
TOTAL SCORE: 3
TACTILE DISTURBANCES: VERY MILD ITCHING, PINS AND NEEDLES, BURNING OR NUMBNESS
TOTAL SCORE: 5
AUDITORY DISTURBANCES: NOT PRESENT
AUDITORY DISTURBANCES: NOT PRESENT
TREMOR: NO TREMOR
TREMOR: NO TREMOR
HEADACHE, FULLNESS IN HEAD: VERY MILD
PAROXYSMAL SWEATS: BARELY PERCEPTIBLE SWEATING, PALMS MOIST
HEADACHE, FULLNESS IN HEAD: VERY MILD
VISUAL DISTURBANCES: NOT PRESENT
AUDITORY DISTURBANCES: NOT PRESENT
ANXIETY: MILDLY ANXIOUS
NAUSEA AND VOMITING: NO NAUSEA AND NO VOMITING
VISUAL DISTURBANCES: VERY MILD SENSITIVITY
BLOOD PRESSURE: 112/60
ORIENTATION AND CLOUDING OF SENSORIUM: ORIENTED AND CAN DO SERIAL ADDITIONS
TOTAL SCORE: 7
NAUSEA AND VOMITING: MILD NAUSEA WITH NO VOMITING
NAUSEA AND VOMITING: MILD NAUSEA WITH NO VOMITING
HEADACHE, FULLNESS IN HEAD: VERY MILD
ANXIETY: MILDLY ANXIOUS
TREMOR: NO TREMOR
HEADACHE, FULLNESS IN HEAD: VERY MILD
VISUAL DISTURBANCES: NOT PRESENT
TREMOR: NO TREMOR
AGITATION: NORMAL ACTIVITY
HEADACHE, FULLNESS IN HEAD: NOT PRESENT
PAROXYSMAL SWEATS: BARELY PERCEPTIBLE SWEATING, PALMS MOIST
PULSE: 101
AGITATION: NORMAL ACTIVITY
AUDITORY DISTURBANCES: NOT PRESENT
TACTILE DISTURBANCES: VERY MILD ITCHING, PINS AND NEEDLES, BURNING OR NUMBNESS
AGITATION: NORMAL ACTIVITY
ORIENTATION AND CLOUDING OF SENSORIUM: ORIENTED AND CAN DO SERIAL ADDITIONS
ANXIETY: MILDLY ANXIOUS
AUDITORY DISTURBANCES: NOT PRESENT
AGITATION: NORMAL ACTIVITY
TOTAL SCORE: 6
ANXIETY: MILDLY ANXIOUS
NAUSEA AND VOMITING: MILD NAUSEA WITH NO VOMITING
BLOOD PRESSURE: 107/63
TREMOR: NO TREMOR
AUDITORY DISTURBANCES: NOT PRESENT
BLOOD PRESSURE: 132/79
PAROXYSMAL SWEATS: BARELY PERCEPTIBLE SWEATING, PALMS MOIST
TACTILE DISTURBANCES: VERY MILD ITCHING, PINS AND NEEDLES, BURNING OR NUMBNESS
NAUSEA AND VOMITING: MILD NAUSEA WITH NO VOMITING
AGITATION: NORMAL ACTIVITY
VISUAL DISTURBANCES: NOT PRESENT
AGITATION: NORMAL ACTIVITY
TOTAL SCORE: 4
NAUSEA AND VOMITING: NO NAUSEA AND NO VOMITING
VISUAL DISTURBANCES: VERY MILD SENSITIVITY
TOTAL SCORE: 3
TREMOR: NO TREMOR
HEADACHE, FULLNESS IN HEAD: VERY MILD
ANXIETY: 3
ORIENTATION AND CLOUDING OF SENSORIUM: ORIENTED AND CAN DO SERIAL ADDITIONS
NAUSEA AND VOMITING: NO NAUSEA AND NO VOMITING
TACTILE DISTURBANCES: VERY MILD ITCHING, PINS AND NEEDLES, BURNING OR NUMBNESS
HEADACHE, FULLNESS IN HEAD: VERY MILD
ANXIETY: NO ANXIETY, AT EASE
ANXIETY: MILDLY ANXIOUS
PAROXYSMAL SWEATS: BARELY PERCEPTIBLE SWEATING, PALMS MOIST
PAROXYSMAL SWEATS: BARELY PERCEPTIBLE SWEATING, PALMS MOIST
AUDITORY DISTURBANCES: NOT PRESENT
ANXIETY: 2
BLOOD PRESSURE: 137/87
AUDITORY DISTURBANCES: NOT PRESENT
PULSE: 95
AGITATION: NORMAL ACTIVITY
NAUSEA AND VOMITING: NO NAUSEA AND NO VOMITING
HEADACHE, FULLNESS IN HEAD: VERY MILD
HEADACHE, FULLNESS IN HEAD: NOT PRESENT
ORIENTATION AND CLOUDING OF SENSORIUM: ORIENTED AND CAN DO SERIAL ADDITIONS
AGITATION: NORMAL ACTIVITY
VISUAL DISTURBANCES: NOT PRESENT
PAROXYSMAL SWEATS: NO SWEAT VISIBLE
AGITATION: NORMAL ACTIVITY
NAUSEA AND VOMITING: NO NAUSEA AND NO VOMITING
TACTILE DISTURBANCES: VERY MILD ITCHING, PINS AND NEEDLES, BURNING OR NUMBNESS
ANXIETY: MILDLY ANXIOUS
TOTAL SCORE: 4
PAROXYSMAL SWEATS: 2
HEADACHE, FULLNESS IN HEAD: MODERATE
AGITATION: NORMAL ACTIVITY
TREMOR: NO TREMOR
PAROXYSMAL SWEATS: 2
VISUAL DISTURBANCES: NOT PRESENT
TACTILE DISTURBANCES: VERY MILD ITCHING, PINS AND NEEDLES, BURNING OR NUMBNESS
PULSE: 89

## 2024-01-03 ASSESSMENT — COGNITIVE AND FUNCTIONAL STATUS - GENERAL
DAILY ACTIVITIY SCORE: 24
MOBILITY SCORE: 24

## 2024-01-03 ASSESSMENT — PAIN - FUNCTIONAL ASSESSMENT
PAIN_FUNCTIONAL_ASSESSMENT: 0-10

## 2024-01-03 ASSESSMENT — PAIN SCALES - GENERAL
PAINLEVEL_OUTOF10: 2
PAINLEVEL_OUTOF10: 0 - NO PAIN
PAINLEVEL_OUTOF10: 6

## 2024-01-03 NOTE — PROGRESS NOTES
Physical Therapy                 Therapy Communication Note    Patient Name: Deyvi Araujo  MRN: [id number]  Today's Date: 1/3/2024     Discipline: Physical Therapy    Comment: PT order received, chart reviewed, pt is independent with functional mobility and has no skilled PT needs at this time.  Discharge PT.

## 2024-01-03 NOTE — CARE PLAN
Problem: Fall/Injury  Goal: Not fall by end of shift  Outcome: Progressing     Problem: Pain - Adult  Goal: Verbalizes/displays adequate comfort level or baseline comfort level  Outcome: Progressing     Problem: Safety - Adult  Goal: Free from fall injury  Outcome: Progressing     Problem: Chronic Conditions and Co-morbidities  Goal: Patient's chronic conditions and co-morbidity symptoms are monitored and maintained or improved  Outcome: Progressing   The patient's goals for the shift include      The clinical goals for the shift include patient will score less than 10 on CIWA scale throughout shift.     Patient had a restful night, patient still having q2h vitals and CIWA assessment. Patient expressing that oral ativan is helping and he feels he is through the worst of the withdrawal symptoms. Patient VSS, call light within reach. No needs expressed.

## 2024-01-03 NOTE — PROGRESS NOTES
MRN: [id number]  SERVICE DATE:  1/3/2024   SERVICE TIME:  9:41 AM  Today's Date: 01/03/24  Admission Date: 12/29/2023  Hospital Day: #5    Subjective      Patient seen today resting comfortably still on CIWA slowly improving has been ambulating on his own        Objective      ROS:   General: Denies any change in weight, fever, chills, fatigue.   Head and Neck: Denies any headaches, syncope or history of head injury.   Eyes/Ears: Denies any cataracts, glaucoma, blurred vision, tinnitus.   Nose: Denies any epistaxis or sinus problems   Respiratory: Denies any cough, hemoptysis, wheezing, asthma, dyspnea.   Cardiovascular: No orthopnea, dyspnea, palpitations, congestive heart failure.   Gastrointestinal: Denies any indigestion, nausea, vomiting, diarrhea, constipation.   Neuro: No dizzyness, headache or syncope   ID: No fever or chills.   Endocrine: No weight loss or weight gain.  No thyroid or diabetic complaints     MEDICATIONS:  Current Facility-Administered Medications   Medication Dose Route Frequency Provider Last Rate Last Admin    amLODIPine (Norvasc) tablet 10 mg  10 mg oral Daily Nikky Mei, APRN-CNP   10 mg at 01/03/24 0828    buprenorphine-naloxone (Suboxone) 8-2 mg per SL tablet 2 tablet  2 tablet sublingual Daily Abi Bañuelos APRN-CNP   2 tablet at 01/03/24 0828    diazePAM (Valium) tablet 5 mg  5 mg oral Daily Corrina R Cavazos, APRN-CNP   5 mg at 01/03/24 0827    enoxaparin (Lovenox) syringe 40 mg  40 mg subcutaneous q24h Nikky Mei, APRN-CNP   40 mg at 01/02/24 1218    folic acid (Folvite) tablet 1 mg  1 mg oral Daily Nikky Mei, APRN-CNP   1 mg at 01/03/24 0828    gabapentin (Neurontin) tablet 600 mg  600 mg oral TID Nikky Mei, APRN-CNP   600 mg at 01/03/24 0828    LORazepam (Ativan) tablet 2 mg  2 mg oral q2h PRN Corrina R Cavazos, APRN-CNP   2 mg at 01/02/24 2212    Or    LORazepam (Ativan) tablet 1 mg  1 mg oral q2h PRN Corrina R Cavazos, APRN-CNP   1 mg at 01/02/24 2013  "   Or    LORazepam (Ativan) tablet 0.5 mg  0.5 mg oral q2h PRN Corrina Cavazos APRN-CNP   0.5 mg at 01/03/24 0838    losartan 100 mg, hydroCHLOROthiazide 12.5 mg for Hyzaar 100/12.5   oral Daily Nikky M HANNAH MeiN-CNP   Given at 01/03/24 0827    multivitamin with minerals 1 tablet  1 tablet oral Daily Nikky Mei, APRN-CNP   1 tablet at 01/03/24 0827    nicotine (Nicoderm CQ) 14 mg/24 hr patch 1 patch  1 patch transdermal Daily Nikky MARTINEZ HANNAH MeiN-CNP   1 patch at 01/03/24 0826    Followed by    [START ON 2/9/2024] nicotine (Nicoderm CQ) 7 mg/24 hr patch 1 patch  1 patch transdermal Daily Nikky M Hamida APRN-CNP        ondansetron (Zofran) injection 4 mg  4 mg intravenous q6h PRN Nikky M HANNAH MeiN-CNP   4 mg at 01/02/24 1818    pantoprazole (ProtoNix) EC tablet 40 mg  40 mg oral Daily before breakfast Nikky M Hamida APRN-CNP   40 mg at 01/03/24 0602    polyethylene glycol (Glycolax, Miralax) packet 17 g  17 g oral Daily PRN Nikky MARTINEZ Hamida APRN-CNP        QUEtiapine (SEROquel) tablet 25 mg  25 mg oral Nightly Nikky M HANNAH MeiN-CNP   25 mg at 01/02/24 2013    thiamine (Vitamin B-1) tablet 100 mg  100 mg oral Daily Yvonne Wakefield APRN-CNP   100 mg at 01/03/24 0828    venlafaxine XR (Effexor-XR) 24 hr capsule 187.5 mg  187.5 mg oral Daily Brown Babb MD   187.5 mg at 01/03/24 0827         PHYSICAL EXAM:   /59   Pulse 94   Temp 36.1 °C (97 °F)   Resp 18   Ht 1.88 m (6' 2\")   Wt 132 kg (291 lb 7.2 oz)   SpO2 91%   BMI 37.42 kg/m²      CONSTITUTIONAL - well nourished, well developed, looks like stated age, in no acute distress, not ill-appearing   SKIN - normal skin color and pigmentation, normal skin turgor without rash   HEAD - no trauma, normocephalic  EYES - pupils are equal and reactive to light, extraocular muscles are intact, and normal external exam  ENT - TM's intact, no injection, no signs of infection, uvula midline, normal tongue movement and throat normal  NECK - " "supple without rigidity, no neck mass was observed, no thyromegaly    CHEST - clear to auscultation, no wheezing, no crackles and no rales, good effort  CARDIAC - regular rate and regular rhythm, no skipped beats, no murmur  ABDOMEN - no organomegaly, soft, nontender, nondistended, normal bowel sounds   NEUROLOGICAL - normal gait, normal balance, normal motor, no ataxia, DTRs equal and symmetrical; alert, oriented and no focal signs  PSYCHIATRIC - alert, pleasant and cordial, age-appropriate  IMMUNOLOGIC - no cervical lymphadenopathy       Labs:  Lab Results   Component Value Date    WBC 8.6 01/03/2024    HGB 16.9 01/03/2024    HCT 50.3 01/03/2024     (H) 01/03/2024     01/03/2024     Lab Results   Component Value Date    GLUCOSE 124 (H) 01/03/2024    CALCIUM 9.6 01/03/2024     (L) 01/03/2024    K 3.8 01/03/2024    CO2 26 01/03/2024    CL 97 (L) 01/03/2024    BUN 26 (H) 01/03/2024    CREATININE 1.17 01/03/2024   ESR: --No results found for: \"SEDRATE\"No results found for: \"CRP\"  Lab Results   Component Value Date     (H) 01/03/2024    AST 75 (H) 01/03/2024    ALKPHOS 82 01/03/2024    BILITOT 0.7 01/03/2024                      Problem List Items Addressed This Visit             ICD-10-CM    * (Principal) Alcohol withdrawal syndrome, uncomplicated (CMS/[facility]) - Primary F10.930     Other Visit Diagnoses         Codes    Chest pain, unspecified type     R07.9    Other specified diabetes mellitus with other specified complication, without long-term current use of insulin (CMS/[facility])     E13.69    Transaminitis     R74.01    Alcohol abuse     F10.10        Continue CIWA  Recheck electrolytes  PT OT  DVT prophylaxis  Other consults on board                 David Chadwick MD  This note was created using [employer]. Any inadvertent grammar, spelling or syntax errors are do to voice recognition software error and are not intentional.      "

## 2024-01-04 ENCOUNTER — APPOINTMENT (OUTPATIENT)
Dept: RADIOLOGY | Facility: HOSPITAL | Age: 44
End: 2024-01-04
Payer: COMMERCIAL

## 2024-01-04 LAB
ALBUMIN SERPL BCP-MCNC: 3.8 G/DL (ref 3.4–5)
ALP SERPL-CCNC: 73 U/L (ref 33–120)
ALT SERPL W P-5'-P-CCNC: 108 U/L (ref 10–52)
ANION GAP SERPL CALC-SCNC: 13 MMOL/L (ref 10–20)
ANION GAP SERPL CALC-SCNC: 15 MMOL/L (ref 10–20)
ANION GAP SERPL CALC-SCNC: 17 MMOL/L (ref 10–20)
APPEARANCE UR: CLEAR
AST SERPL W P-5'-P-CCNC: 57 U/L (ref 9–39)
BILIRUB SERPL-MCNC: 0.5 MG/DL (ref 0–1.2)
BILIRUB UR STRIP.AUTO-MCNC: NEGATIVE MG/DL
BUN SERPL-MCNC: 44 MG/DL (ref 6–23)
BUN SERPL-MCNC: 47 MG/DL (ref 6–23)
BUN SERPL-MCNC: 55 MG/DL (ref 6–23)
CALCIUM SERPL-MCNC: 9.1 MG/DL (ref 8.6–10.3)
CALCIUM SERPL-MCNC: 9.2 MG/DL (ref 8.6–10.3)
CALCIUM SERPL-MCNC: 9.5 MG/DL (ref 8.6–10.3)
CHLORIDE SERPL-SCNC: 96 MMOL/L (ref 98–107)
CHLORIDE SERPL-SCNC: 97 MMOL/L (ref 98–107)
CHLORIDE SERPL-SCNC: 98 MMOL/L (ref 98–107)
CO2 SERPL-SCNC: 23 MMOL/L (ref 21–32)
CO2 SERPL-SCNC: 23 MMOL/L (ref 21–32)
CO2 SERPL-SCNC: 26 MMOL/L (ref 21–32)
COLOR UR: YELLOW
CREAT SERPL-MCNC: 3.4 MG/DL (ref 0.5–1.3)
CREAT SERPL-MCNC: 3.56 MG/DL (ref 0.5–1.3)
CREAT SERPL-MCNC: 3.59 MG/DL (ref 0.5–1.3)
CREAT UR-MCNC: 92.8 MG/DL (ref 20–370)
ERYTHROCYTE [DISTWIDTH] IN BLOOD BY AUTOMATED COUNT: 12.8 % (ref 11.5–14.5)
GFR SERPL CREATININE-BSD FRML MDRD: 21 ML/MIN/1.73M*2
GFR SERPL CREATININE-BSD FRML MDRD: 21 ML/MIN/1.73M*2
GFR SERPL CREATININE-BSD FRML MDRD: 22 ML/MIN/1.73M*2
GLUCOSE SERPL-MCNC: 102 MG/DL (ref 74–99)
GLUCOSE SERPL-MCNC: 107 MG/DL (ref 74–99)
GLUCOSE SERPL-MCNC: 116 MG/DL (ref 74–99)
GLUCOSE UR STRIP.AUTO-MCNC: NEGATIVE MG/DL
HCT VFR BLD AUTO: 46.5 % (ref 41–52)
HGB BLD-MCNC: 15.8 G/DL (ref 13.5–17.5)
KETONES UR STRIP.AUTO-MCNC: NEGATIVE MG/DL
LEUKOCYTE ESTERASE UR QL STRIP.AUTO: NEGATIVE
MCH RBC QN AUTO: 35.7 PG (ref 26–34)
MCHC RBC AUTO-ENTMCNC: 34 G/DL (ref 32–36)
MCV RBC AUTO: 105 FL (ref 80–100)
NITRITE UR QL STRIP.AUTO: NEGATIVE
NRBC BLD-RTO: 0 /100 WBCS (ref 0–0)
PH UR STRIP.AUTO: 5 [PH]
PLATELET # BLD AUTO: 234 X10*3/UL (ref 150–450)
POTASSIUM SERPL-SCNC: 4.2 MMOL/L (ref 3.5–5.3)
POTASSIUM SERPL-SCNC: 4.8 MMOL/L (ref 3.5–5.3)
POTASSIUM SERPL-SCNC: 4.9 MMOL/L (ref 3.5–5.3)
PROT SERPL-MCNC: 7.3 G/DL (ref 6.4–8.2)
PROT UR STRIP.AUTO-MCNC: NEGATIVE MG/DL
RBC # BLD AUTO: 4.42 X10*6/UL (ref 4.5–5.9)
RBC # UR STRIP.AUTO: NEGATIVE /UL
SODIUM SERPL-SCNC: 131 MMOL/L (ref 136–145)
SODIUM SERPL-SCNC: 131 MMOL/L (ref 136–145)
SODIUM SERPL-SCNC: 132 MMOL/L (ref 136–145)
SODIUM UR-SCNC: 42 MMOL/L
SODIUM/CREAT UR-RTO: 45 MMOL/G CREAT
SP GR UR STRIP.AUTO: 1.01
UROBILINOGEN UR STRIP.AUTO-MCNC: <2 MG/DL
WBC # BLD AUTO: 8.1 X10*3/UL (ref 4.4–11.3)

## 2024-01-04 PROCEDURE — S4991 NICOTINE PATCH NONLEGEND: HCPCS | Performed by: NURSE PRACTITIONER

## 2024-01-04 PROCEDURE — 76770 US EXAM ABDO BACK WALL COMP: CPT | Performed by: RADIOLOGY

## 2024-01-04 PROCEDURE — 2500000001 HC RX 250 WO HCPCS SELF ADMINISTERED DRUGS (ALT 637 FOR MEDICARE OP): Performed by: NURSE PRACTITIONER

## 2024-01-04 PROCEDURE — 2500000004 HC RX 250 GENERAL PHARMACY W/ HCPCS (ALT 636 FOR OP/ED): Performed by: NURSE PRACTITIONER

## 2024-01-04 PROCEDURE — 2500000001 HC RX 250 WO HCPCS SELF ADMINISTERED DRUGS (ALT 637 FOR MEDICARE OP): Performed by: PSYCHIATRY & NEUROLOGY

## 2024-01-04 PROCEDURE — 2500000002 HC RX 250 W HCPCS SELF ADMINISTERED DRUGS (ALT 637 FOR MEDICARE OP, ALT 636 FOR OP/ED): Performed by: NURSE PRACTITIONER

## 2024-01-04 PROCEDURE — 76770 US EXAM ABDO BACK WALL COMP: CPT

## 2024-01-04 PROCEDURE — 81003 URINALYSIS AUTO W/O SCOPE: CPT | Performed by: UROLOGY

## 2024-01-04 PROCEDURE — 36415 COLL VENOUS BLD VENIPUNCTURE: CPT | Performed by: NURSE PRACTITIONER

## 2024-01-04 PROCEDURE — 1200000002 HC GENERAL ROOM WITH TELEMETRY DAILY

## 2024-01-04 PROCEDURE — 84075 ASSAY ALKALINE PHOSPHATASE: CPT | Performed by: NURSE PRACTITIONER

## 2024-01-04 PROCEDURE — 82570 ASSAY OF URINE CREATININE: CPT | Performed by: NURSE PRACTITIONER

## 2024-01-04 PROCEDURE — 85027 COMPLETE CBC AUTOMATED: CPT | Performed by: NURSE PRACTITIONER

## 2024-01-04 PROCEDURE — 80048 BASIC METABOLIC PNL TOTAL CA: CPT | Mod: CCI | Performed by: NURSE PRACTITIONER

## 2024-01-04 PROCEDURE — 2500000004 HC RX 250 GENERAL PHARMACY W/ HCPCS (ALT 636 FOR OP/ED): Performed by: PSYCHIATRY & NEUROLOGY

## 2024-01-04 RX ORDER — GABAPENTIN 300 MG/1
300 CAPSULE ORAL 3 TIMES DAILY
Status: DISCONTINUED | OUTPATIENT
Start: 2024-01-04 | End: 2024-01-07

## 2024-01-04 RX ADMIN — GABAPENTIN 300 MG: 300 CAPSULE ORAL at 20:19

## 2024-01-04 RX ADMIN — DIAZEPAM 5 MG: 5 TABLET ORAL at 09:40

## 2024-01-04 RX ADMIN — ENOXAPARIN SODIUM 40 MG: 40 INJECTION SUBCUTANEOUS at 13:54

## 2024-01-04 RX ADMIN — Medication 1 TABLET: at 09:40

## 2024-01-04 RX ADMIN — GABAPENTIN 600 MG: 300 CAPSULE ORAL at 09:40

## 2024-01-04 RX ADMIN — PANTOPRAZOLE SODIUM 40 MG: 40 TABLET, DELAYED RELEASE ORAL at 06:20

## 2024-01-04 RX ADMIN — LORAZEPAM 0.5 MG: 0.5 TABLET ORAL at 09:41

## 2024-01-04 RX ADMIN — NICOTINE 1 PATCH: 14 PATCH, EXTENDED RELEASE TRANSDERMAL at 09:00

## 2024-01-04 RX ADMIN — GABAPENTIN 300 MG: 300 CAPSULE ORAL at 15:36

## 2024-01-04 RX ADMIN — AMLODIPINE BESYLATE 10 MG: 10 TABLET ORAL at 09:39

## 2024-01-04 RX ADMIN — LORAZEPAM 0.5 MG: 0.5 TABLET ORAL at 20:19

## 2024-01-04 RX ADMIN — THIAMINE HCL TAB 100 MG 100 MG: 100 TAB at 09:39

## 2024-01-04 RX ADMIN — QUETIAPINE FUMARATE 25 MG: 25 TABLET ORAL at 20:19

## 2024-01-04 RX ADMIN — FOLIC ACID 1 MG: 1 TABLET ORAL at 09:40

## 2024-01-04 RX ADMIN — VENLAFAXINE HYDROCHLORIDE 187.5 MG: 75 CAPSULE, EXTENDED RELEASE ORAL at 09:00

## 2024-01-04 RX ADMIN — ONDANSETRON 4 MG: 2 INJECTION INTRAMUSCULAR; INTRAVENOUS at 15:35

## 2024-01-04 RX ADMIN — BUPRENORPHINE AND NALOXONE 2 TABLET: 8; 2 TABLET SUBLINGUAL at 09:40

## 2024-01-04 ASSESSMENT — LIFESTYLE VARIABLES
NAUSEA AND VOMITING: NO NAUSEA AND NO VOMITING
TREMOR: NO TREMOR
ANXIETY: MILDLY ANXIOUS
AGITATION: NORMAL ACTIVITY
PAROXYSMAL SWEATS: NO SWEAT VISIBLE
TOTAL SCORE: 7
TACTILE DISTURBANCES: VERY MILD ITCHING, PINS AND NEEDLES, BURNING OR NUMBNESS
NAUSEA AND VOMITING: NO NAUSEA AND NO VOMITING
PAROXYSMAL SWEATS: 3
TOTAL SCORE: 2
HEADACHE, FULLNESS IN HEAD: VERY MILD
TACTILE DISTURBANCES: VERY MILD ITCHING, PINS AND NEEDLES, BURNING OR NUMBNESS
TACTILE DISTURBANCES: VERY MILD ITCHING, PINS AND NEEDLES, BURNING OR NUMBNESS
TREMOR: NO TREMOR
HEADACHE, FULLNESS IN HEAD: NOT PRESENT
PULSE: 81
HEADACHE, FULLNESS IN HEAD: VERY MILD
PAROXYSMAL SWEATS: NO SWEAT VISIBLE
TACTILE DISTURBANCES: VERY MILD ITCHING, PINS AND NEEDLES, BURNING OR NUMBNESS
ANXIETY: 2
TREMOR: NO TREMOR
AUDITORY DISTURBANCES: VERY MILD HARSHNESS OR ABILITY TO FRIGHTEN
ANXIETY: MODERATELY ANXIOUS, OR GUARDED, SO ANXIETY IS INFERRED
AUDITORY DISTURBANCES: NOT PRESENT
AUDITORY DISTURBANCES: NOT PRESENT
VISUAL DISTURBANCES: NOT PRESENT
TREMOR: NO TREMOR
NAUSEA AND VOMITING: NO NAUSEA AND NO VOMITING
ORIENTATION AND CLOUDING OF SENSORIUM: ORIENTED AND CAN DO SERIAL ADDITIONS
TOTAL SCORE: 7
AUDITORY DISTURBANCES: NOT PRESENT
ORIENTATION AND CLOUDING OF SENSORIUM: ORIENTED AND CAN DO SERIAL ADDITIONS
TREMOR: NO TREMOR
AUDITORY DISTURBANCES: NOT PRESENT
AUDITORY DISTURBANCES: NOT PRESENT
BLOOD PRESSURE: 118/62
AGITATION: NORMAL ACTIVITY
PULSE: 71
ORIENTATION AND CLOUDING OF SENSORIUM: ORIENTED AND CAN DO SERIAL ADDITIONS
VISUAL DISTURBANCES: NOT PRESENT
TACTILE DISTURBANCES: VERY MILD ITCHING, PINS AND NEEDLES, BURNING OR NUMBNESS
ORIENTATION AND CLOUDING OF SENSORIUM: ORIENTED AND CAN DO SERIAL ADDITIONS
VISUAL DISTURBANCES: NOT PRESENT
PAROXYSMAL SWEATS: 2
NAUSEA AND VOMITING: NO NAUSEA AND NO VOMITING
AUDITORY DISTURBANCES: NOT PRESENT
TACTILE DISTURBANCES: VERY MILD ITCHING, PINS AND NEEDLES, BURNING OR NUMBNESS
HEADACHE, FULLNESS IN HEAD: VERY MILD
TREMOR: NO TREMOR
ORIENTATION AND CLOUDING OF SENSORIUM: ORIENTED AND CAN DO SERIAL ADDITIONS
AGITATION: NORMAL ACTIVITY
TREMOR: NO TREMOR
ANXIETY: MILDLY ANXIOUS
VISUAL DISTURBANCES: NOT PRESENT
NAUSEA AND VOMITING: MILD NAUSEA WITH NO VOMITING
BLOOD PRESSURE: 123/61
ANXIETY: NO ANXIETY, AT EASE
PAROXYSMAL SWEATS: BARELY PERCEPTIBLE SWEATING, PALMS MOIST
AGITATION: NORMAL ACTIVITY
HEADACHE, FULLNESS IN HEAD: VERY MILD
AGITATION: NORMAL ACTIVITY
PAROXYSMAL SWEATS: NO SWEAT VISIBLE
VISUAL DISTURBANCES: NOT PRESENT
TOTAL SCORE: 3
NAUSEA AND VOMITING: NO NAUSEA AND NO VOMITING
TOTAL SCORE: 4
HEADACHE, FULLNESS IN HEAD: VERY MILD
VISUAL DISTURBANCES: NOT PRESENT
AGITATION: NORMAL ACTIVITY
NAUSEA AND VOMITING: NO NAUSEA AND NO VOMITING
PAROXYSMAL SWEATS: BARELY PERCEPTIBLE SWEATING, PALMS MOIST
ANXIETY: MODERATELY ANXIOUS, OR GUARDED, SO ANXIETY IS INFERRED
TOTAL SCORE: 6
ORIENTATION AND CLOUDING OF SENSORIUM: ORIENTED AND CAN DO SERIAL ADDITIONS
HEADACHE, FULLNESS IN HEAD: NOT PRESENT

## 2024-01-04 ASSESSMENT — COGNITIVE AND FUNCTIONAL STATUS - GENERAL
MOBILITY SCORE: 24
DAILY ACTIVITIY SCORE: 24

## 2024-01-04 ASSESSMENT — PAIN - FUNCTIONAL ASSESSMENT
PAIN_FUNCTIONAL_ASSESSMENT: 0-10
PAIN_FUNCTIONAL_ASSESSMENT: 0-10

## 2024-01-04 ASSESSMENT — PAIN SCALES - GENERAL
PAINLEVEL_OUTOF10: 0 - NO PAIN

## 2024-01-04 NOTE — NURSING NOTE
1500 pt sitting up to chair, c/o nausea. Will medicate with Zofran per order. Pt was able to eat 100% of lunch. Pt encouraged to sit up in chair as long as he can tolerate. Pt advised to urinate in urinal to measure output d/t kidney function. Pt sts understanding.

## 2024-01-04 NOTE — PROGRESS NOTES
Met with the pt. He reports drinking mainly to address his anxiety which he believes is due to PTSD which was diagnosed when he was 11. He is on suboxone and sees a counselor for that but he does not see anyone specifically for the PTSD. He accepted the info for ETOH recovery  and said that he is interested in ongoing  counseling for the PTSD and anxiety. Provided the pt with info for the [facility]  ([phone]) for mental health counseling. He stated he plans to call them.     Luis F Kruse

## 2024-01-04 NOTE — PROGRESS NOTES
MRN: [id number]  SERVICE DATE:  1/4/2024   SERVICE TIME:  9:52 AM  Today's Date: 01/04/24  Admission Date: 12/29/2023  Hospital Day: #6    Subjective      Patient seen today resting comfortably.  Having some issues with incontinence.  Kidney function has deteriorated        Objective      ROS:   General: Denies any change in weight, fever, chills, fatigue.   Head and Neck: Denies any headaches, syncope or history of head injury.   Eyes/Ears: Denies any cataracts, glaucoma, blurred vision, tinnitus.   Nose: Denies any epistaxis or sinus problems   Respiratory: Denies any cough, hemoptysis, wheezing, asthma, dyspnea.   Cardiovascular: No orthopnea, dyspnea, palpitations, congestive heart failure.   Gastrointestinal: Denies any indigestion, nausea, vomiting, diarrhea, constipation.   Neuro: No dizzyness, headache or syncope   ID: No fever or chills.   Endocrine: No weight loss or weight gain.  No thyroid or diabetic complaints     MEDICATIONS:  Current Facility-Administered Medications   Medication Dose Route Frequency Provider Last Rate Last Admin    amLODIPine (Norvasc) tablet 10 mg  10 mg oral Daily Abi Bañuelos APRN-CNP   10 mg at 01/04/24 0939    buprenorphine-naloxone (Suboxone) 8-2 mg per SL tablet 2 tablet  2 tablet sublingual Daily Abi Bañuelos APRN-CNP   2 tablet at 01/04/24 0940    diazePAM (Valium) tablet 5 mg  5 mg oral Daily Corrina R Cavazos, APRN-CNP   5 mg at 01/04/24 0940    enoxaparin (Lovenox) syringe 40 mg  40 mg subcutaneous q24h Nikky Mei APRN-CNP   40 mg at 01/03/24 1134    folic acid (Folvite) tablet 1 mg  1 mg oral Daily Nikky Mei, APRN-CNP   1 mg at 01/04/24 0940    gabapentin (Neurontin) tablet 600 mg  600 mg oral TID Nikky Mei, APRN-CNP   600 mg at 01/04/24 0940    LORazepam (Ativan) tablet 2 mg  2 mg oral q2h PRN Corrina R Cavazos, APRN-CNP   2 mg at 01/03/24 1221    Or    LORazepam (Ativan) tablet 1 mg  1 mg oral q2h PRN Corrina R Cavazos, APRN-CNP   1 mg at  "01/03/24 2010    Or    LORazepam (Ativan) tablet 0.5 mg  0.5 mg oral q2h PRN DIMITRI Sharma-CNP   0.5 mg at 01/04/24 0941    [Held by provider] losartan 100 mg, hydroCHLOROthiazide 12.5 mg for Hyzaar 100/12.5   oral Daily Nikky MICHELLE HANNAH MeiN-CNP   Given at 01/03/24 0827    multivitamin with minerals 1 tablet  1 tablet oral Daily Nikky MICHELLE HANNAH MeiN-CNP   1 tablet at 01/04/24 0940    nicotine (Nicoderm CQ) 14 mg/24 hr patch 1 patch  1 patch transdermal Daily Nikky MICHELLE HANNAH MeiN-CNP   1 patch at 01/03/24 0826    Followed by    [START ON 2/9/2024] nicotine (Nicoderm CQ) 7 mg/24 hr patch 1 patch  1 patch transdermal Daily Nikky MICHELLE HANNAH MeiN-CNP        ondansetron (Zofran) injection 4 mg  4 mg intravenous q6h PRN Nikky Mei APRN-CNP   4 mg at 01/02/24 1818    pantoprazole (ProtoNix) EC tablet 40 mg  40 mg oral Daily before breakfast Nikky MICHELLE HANNAH MeiN-CNP   40 mg at 01/04/24 0620    polyethylene glycol (Glycolax, Miralax) packet 17 g  17 g oral Daily PRN Nikky Mei APRN-CNP        QUEtiapine (SEROquel) tablet 25 mg  25 mg oral Nightly Nikky Mei APRN-CNP   25 mg at 01/03/24 1942    thiamine (Vitamin B-1) tablet 100 mg  100 mg oral Daily DIMITRI Sims-CNP   100 mg at 01/04/24 0939    venlafaxine XR (Effexor-XR) 24 hr capsule 187.5 mg  187.5 mg oral Daily Brown Babb MD   187.5 mg at 01/03/24 0827         PHYSICAL EXAM:   /72 (BP Location: Left arm, Patient Position: Lying)   Pulse 77   Temp 36 °C (96.8 °F) (Temporal)   Resp 15   Ht 1.88 m (6' 2\")   Wt 132 kg (291 lb 7.2 oz)   SpO2 99%   BMI 37.42 kg/m²      CONSTITUTIONAL - well nourished, well developed, looks like stated age, in no acute distress, not ill-appearing   SKIN - normal skin color and pigmentation, normal skin turgor without rash   HEAD - no trauma, normocephalic  EYES - pupils are equal and reactive to light, extraocular muscles are intact, and normal external exam  ENT - TM's intact, no " "injection, no signs of infection, uvula midline, normal tongue movement and throat normal  NECK - supple without rigidity, no neck mass was observed, no thyromegaly    CHEST - clear to auscultation, no wheezing, no crackles and no rales, good effort  CARDIAC - regular rate and regular rhythm, no skipped beats, no murmur  ABDOMEN - no organomegaly, soft, nontender, nondistended, normal bowel sounds   NEUROLOGICAL - normal gait, normal balance, normal motor, no ataxia, DTRs equal and symmetrical; alert, oriented and no focal signs  PSYCHIATRIC - alert, pleasant and cordial, age-appropriate  IMMUNOLOGIC - no cervical lymphadenopathy       Labs:  Lab Results   Component Value Date    WBC 8.1 01/04/2024    HGB 15.8 01/04/2024    HCT 46.5 01/04/2024     (H) 01/04/2024     01/04/2024     Lab Results   Component Value Date    GLUCOSE 102 (H) 01/04/2024    CALCIUM 9.1 01/04/2024     (L) 01/04/2024    K 4.2 01/04/2024    CO2 23 01/04/2024    CL 98 01/04/2024    BUN 44 (H) 01/04/2024    CREATININE 3.40 (H) 01/04/2024   ESR: --No results found for: \"SEDRATE\"No results found for: \"CRP\"  Lab Results   Component Value Date     (H) 01/04/2024    AST 57 (H) 01/04/2024    ALKPHOS 73 01/04/2024    BILITOT 0.5 01/04/2024                      Problem List Items Addressed This Visit             ICD-10-CM    * (Principal) Alcohol withdrawal syndrome, uncomplicated (CMS/[facility]) - Primary F10.930     Other Visit Diagnoses         Codes    Chest pain, unspecified type     R07.9    Other specified diabetes mellitus with other specified complication, without long-term current use of insulin (CMS/[facility])     E13.69    Transaminitis     R74.01    Alcohol abuse     F10.10          Continue CIWA  No tremors  PT OT  Urology evaluation  Straight cath if necessary              David Chadwick MD  This note was created using [employer]. Any inadvertent grammar, spelling or syntax errors are do to voice recognition software error " and are not intentional.

## 2024-01-04 NOTE — NURSING NOTE
Shift summary:  Uneventful night. Pt is still a Q2 CIWA until noon today unless score goes above 9. Ativan given twice, 1mg one time and 0.5 the second time. Seroquel and gabapentin also given PM.  Tylenol was effective for headache. No tremors present. Paresthesia is baseline per pt. Sweating and anxiety were primary S/s. Pt fell asleep at midnight and slept well for a awhile. Tele showed NSR and slight sinus tach at beginning of shift. VS stable. Safety maintained. Seizure pads on. Bed alarm on. Pt ambulated fine without assistance.

## 2024-01-05 LAB
ALBUMIN SERPL BCP-MCNC: 3.8 G/DL (ref 3.4–5)
ALP SERPL-CCNC: 77 U/L (ref 33–120)
ALT SERPL W P-5'-P-CCNC: 91 U/L (ref 10–52)
ANION GAP SERPL CALC-SCNC: 13 MMOL/L (ref 10–20)
AST SERPL W P-5'-P-CCNC: 43 U/L (ref 9–39)
BILIRUB SERPL-MCNC: 0.5 MG/DL (ref 0–1.2)
BUN SERPL-MCNC: 56 MG/DL (ref 6–23)
CALCIUM SERPL-MCNC: 9.1 MG/DL (ref 8.6–10.3)
CHLORIDE SERPL-SCNC: 98 MMOL/L (ref 98–107)
CO2 SERPL-SCNC: 26 MMOL/L (ref 21–32)
CREAT SERPL-MCNC: 3.39 MG/DL (ref 0.5–1.3)
ERYTHROCYTE [DISTWIDTH] IN BLOOD BY AUTOMATED COUNT: 12.7 % (ref 11.5–14.5)
GFR SERPL CREATININE-BSD FRML MDRD: 22 ML/MIN/1.73M*2
GLUCOSE SERPL-MCNC: 112 MG/DL (ref 74–99)
HCT VFR BLD AUTO: 45.9 % (ref 41–52)
HGB BLD-MCNC: 15.4 G/DL (ref 13.5–17.5)
MCH RBC QN AUTO: 35.3 PG (ref 26–34)
MCHC RBC AUTO-ENTMCNC: 33.6 G/DL (ref 32–36)
MCV RBC AUTO: 105 FL (ref 80–100)
NRBC BLD-RTO: 0 /100 WBCS (ref 0–0)
PLATELET # BLD AUTO: 231 X10*3/UL (ref 150–450)
POTASSIUM SERPL-SCNC: 4.2 MMOL/L (ref 3.5–5.3)
PROT SERPL-MCNC: 7.2 G/DL (ref 6.4–8.2)
RBC # BLD AUTO: 4.36 X10*6/UL (ref 4.5–5.9)
SODIUM SERPL-SCNC: 133 MMOL/L (ref 136–145)
WBC # BLD AUTO: 7.5 X10*3/UL (ref 4.4–11.3)

## 2024-01-05 PROCEDURE — 80053 COMPREHEN METABOLIC PANEL: CPT | Performed by: NURSE PRACTITIONER

## 2024-01-05 PROCEDURE — 2500000001 HC RX 250 WO HCPCS SELF ADMINISTERED DRUGS (ALT 637 FOR MEDICARE OP): Performed by: NURSE PRACTITIONER

## 2024-01-05 PROCEDURE — 2500000002 HC RX 250 W HCPCS SELF ADMINISTERED DRUGS (ALT 637 FOR MEDICARE OP, ALT 636 FOR OP/ED): Performed by: NURSE PRACTITIONER

## 2024-01-05 PROCEDURE — 2500000004 HC RX 250 GENERAL PHARMACY W/ HCPCS (ALT 636 FOR OP/ED): Performed by: INTERNAL MEDICINE

## 2024-01-05 PROCEDURE — 2500000004 HC RX 250 GENERAL PHARMACY W/ HCPCS (ALT 636 FOR OP/ED): Performed by: NURSE PRACTITIONER

## 2024-01-05 PROCEDURE — 94760 N-INVAS EAR/PLS OXIMETRY 1: CPT

## 2024-01-05 PROCEDURE — 85027 COMPLETE CBC AUTOMATED: CPT | Performed by: NURSE PRACTITIONER

## 2024-01-05 PROCEDURE — 2500000004 HC RX 250 GENERAL PHARMACY W/ HCPCS (ALT 636 FOR OP/ED): Performed by: PSYCHIATRY & NEUROLOGY

## 2024-01-05 PROCEDURE — 1200000002 HC GENERAL ROOM WITH TELEMETRY DAILY

## 2024-01-05 PROCEDURE — 2500000001 HC RX 250 WO HCPCS SELF ADMINISTERED DRUGS (ALT 637 FOR MEDICARE OP): Performed by: PHYSICIAN ASSISTANT

## 2024-01-05 PROCEDURE — S4991 NICOTINE PATCH NONLEGEND: HCPCS | Performed by: NURSE PRACTITIONER

## 2024-01-05 PROCEDURE — 36415 COLL VENOUS BLD VENIPUNCTURE: CPT | Performed by: NURSE PRACTITIONER

## 2024-01-05 PROCEDURE — 2500000001 HC RX 250 WO HCPCS SELF ADMINISTERED DRUGS (ALT 637 FOR MEDICARE OP): Performed by: PSYCHIATRY & NEUROLOGY

## 2024-01-05 RX ORDER — BISACODYL 5 MG
5 TABLET, DELAYED RELEASE (ENTERIC COATED) ORAL DAILY PRN
Status: DISCONTINUED | OUTPATIENT
Start: 2024-01-05 | End: 2024-01-07 | Stop reason: HOSPADM

## 2024-01-05 RX ORDER — SODIUM CHLORIDE, SODIUM LACTATE, POTASSIUM CHLORIDE, CALCIUM CHLORIDE 600; 310; 30; 20 MG/100ML; MG/100ML; MG/100ML; MG/100ML
100 INJECTION, SOLUTION INTRAVENOUS CONTINUOUS
Status: DISCONTINUED | OUTPATIENT
Start: 2024-01-05 | End: 2024-01-06

## 2024-01-05 RX ADMIN — GABAPENTIN 300 MG: 300 CAPSULE ORAL at 08:33

## 2024-01-05 RX ADMIN — SODIUM CHLORIDE, POTASSIUM CHLORIDE, SODIUM LACTATE AND CALCIUM CHLORIDE 100 ML/HR: 600; 310; 30; 20 INJECTION, SOLUTION INTRAVENOUS at 15:28

## 2024-01-05 RX ADMIN — THIAMINE HCL TAB 100 MG 100 MG: 100 TAB at 08:33

## 2024-01-05 RX ADMIN — GABAPENTIN 300 MG: 300 CAPSULE ORAL at 20:28

## 2024-01-05 RX ADMIN — QUETIAPINE FUMARATE 25 MG: 25 TABLET ORAL at 20:28

## 2024-01-05 RX ADMIN — ONDANSETRON 4 MG: 2 INJECTION INTRAMUSCULAR; INTRAVENOUS at 16:55

## 2024-01-05 RX ADMIN — PANTOPRAZOLE SODIUM 40 MG: 40 TABLET, DELAYED RELEASE ORAL at 05:40

## 2024-01-05 RX ADMIN — POLYETHYLENE GLYCOL 3350 17 G: 17 POWDER, FOR SOLUTION ORAL at 15:28

## 2024-01-05 RX ADMIN — LORAZEPAM 2 MG: 1 TABLET ORAL at 16:55

## 2024-01-05 RX ADMIN — VENLAFAXINE HYDROCHLORIDE 187.5 MG: 75 CAPSULE, EXTENDED RELEASE ORAL at 08:33

## 2024-01-05 RX ADMIN — DIAZEPAM 5 MG: 5 TABLET ORAL at 08:33

## 2024-01-05 RX ADMIN — BUPRENORPHINE AND NALOXONE 2 TABLET: 8; 2 TABLET SUBLINGUAL at 08:33

## 2024-01-05 RX ADMIN — Medication 1 TABLET: at 08:33

## 2024-01-05 RX ADMIN — BISACODYL 5 MG: 5 TABLET, COATED ORAL at 15:28

## 2024-01-05 RX ADMIN — FOLIC ACID 1 MG: 1 TABLET ORAL at 08:34

## 2024-01-05 RX ADMIN — GABAPENTIN 300 MG: 300 CAPSULE ORAL at 15:28

## 2024-01-05 RX ADMIN — AMLODIPINE BESYLATE 10 MG: 10 TABLET ORAL at 08:34

## 2024-01-05 RX ADMIN — ENOXAPARIN SODIUM 40 MG: 40 INJECTION SUBCUTANEOUS at 12:07

## 2024-01-05 RX ADMIN — NICOTINE 1 PATCH: 14 PATCH, EXTENDED RELEASE TRANSDERMAL at 08:32

## 2024-01-05 ASSESSMENT — LIFESTYLE VARIABLES
TREMOR: NO TREMOR
AGITATION: NORMAL ACTIVITY
ORIENTATION AND CLOUDING OF SENSORIUM: ORIENTED AND CAN DO SERIAL ADDITIONS
ANXIETY: 2
TACTILE DISTURBANCES: VERY MILD ITCHING, PINS AND NEEDLES, BURNING OR NUMBNESS
ANXIETY: MILDLY ANXIOUS
BLOOD PRESSURE: 102/64
TACTILE DISTURBANCES: VERY MILD ITCHING, PINS AND NEEDLES, BURNING OR NUMBNESS
TOTAL SCORE: 2
NAUSEA AND VOMITING: MILD NAUSEA WITH NO VOMITING
AGITATION: NORMAL ACTIVITY
VISUAL DISTURBANCES: NOT PRESENT
PULSE: 86
TREMOR: NO TREMOR
HEADACHE, FULLNESS IN HEAD: NOT PRESENT
ORIENTATION AND CLOUDING OF SENSORIUM: ORIENTED AND CAN DO SERIAL ADDITIONS
AGITATION: NORMAL ACTIVITY
NAUSEA AND VOMITING: 2
PULSE: 82
NAUSEA AND VOMITING: NO NAUSEA AND NO VOMITING
ANXIETY: MODERATELY ANXIOUS, OR GUARDED, SO ANXIETY IS INFERRED
ORIENTATION AND CLOUDING OF SENSORIUM: ORIENTED AND CAN DO SERIAL ADDITIONS
AGITATION: NORMAL ACTIVITY
VISUAL DISTURBANCES: NOT PRESENT
NAUSEA AND VOMITING: NO NAUSEA AND NO VOMITING
AGITATION: 2
PAROXYSMAL SWEATS: NO SWEAT VISIBLE
VISUAL DISTURBANCES: NOT PRESENT
VISUAL DISTURBANCES: NOT PRESENT
ORIENTATION AND CLOUDING OF SENSORIUM: ORIENTED AND CAN DO SERIAL ADDITIONS
HEADACHE, FULLNESS IN HEAD: NOT PRESENT
ORIENTATION AND CLOUDING OF SENSORIUM: ORIENTED AND CAN DO SERIAL ADDITIONS
TREMOR: NO TREMOR
TREMOR: NO TREMOR
BLOOD PRESSURE: 125/64
ANXIETY: NO ANXIETY, AT EASE
TACTILE DISTURBANCES: VERY MILD ITCHING, PINS AND NEEDLES, BURNING OR NUMBNESS
TOTAL SCORE: 4
TREMOR: NO TREMOR
PAROXYSMAL SWEATS: NO SWEAT VISIBLE
PAROXYSMAL SWEATS: BARELY PERCEPTIBLE SWEATING, PALMS MOIST
VISUAL DISTURBANCES: NOT PRESENT
TOTAL SCORE: 2
PAROXYSMAL SWEATS: NO SWEAT VISIBLE
HEADACHE, FULLNESS IN HEAD: NOT PRESENT
AUDITORY DISTURBANCES: NOT PRESENT
HEADACHE, FULLNESS IN HEAD: NOT PRESENT
TACTILE DISTURBANCES: VERY MILD ITCHING, PINS AND NEEDLES, BURNING OR NUMBNESS
AUDITORY DISTURBANCES: NOT PRESENT
NAUSEA AND VOMITING: NO NAUSEA AND NO VOMITING
ANXIETY: NO ANXIETY, AT EASE
AGITATION: NORMAL ACTIVITY
AGITATION: NORMAL ACTIVITY
BLOOD PRESSURE: 151/95
TOTAL SCORE: 2
HEADACHE, FULLNESS IN HEAD: VERY MILD
VISUAL DISTURBANCES: NOT PRESENT
AUDITORY DISTURBANCES: NOT PRESENT
AUDITORY DISTURBANCES: NOT PRESENT
TREMOR: NO TREMOR
TACTILE DISTURBANCES: VERY MILD ITCHING, PINS AND NEEDLES, BURNING OR NUMBNESS
AUDITORY DISTURBANCES: NOT PRESENT
AUDITORY DISTURBANCES: NOT PRESENT
NAUSEA AND VOMITING: NO NAUSEA AND NO VOMITING
PAROXYSMAL SWEATS: BEADS OF SWEAT OBVIOUS ON FOREHEAD
AUDITORY DISTURBANCES: NOT PRESENT
ANXIETY: 2
PAROXYSMAL SWEATS: BARELY PERCEPTIBLE SWEATING, PALMS MOIST
ORIENTATION AND CLOUDING OF SENSORIUM: ORIENTED AND CAN DO SERIAL ADDITIONS
ANXIETY: NO ANXIETY, AT EASE
TOTAL SCORE: 3
TOTAL SCORE: 13
PAROXYSMAL SWEATS: NO SWEAT VISIBLE
NAUSEA AND VOMITING: NO NAUSEA AND NO VOMITING
HEADACHE, FULLNESS IN HEAD: VERY MILD
VISUAL DISTURBANCES: NOT PRESENT
HEADACHE, FULLNESS IN HEAD: NOT PRESENT
TACTILE DISTURBANCES: VERY MILD ITCHING, PINS AND NEEDLES, BURNING OR NUMBNESS
TREMOR: NO TREMOR
ORIENTATION AND CLOUDING OF SENSORIUM: ORIENTED AND CAN DO SERIAL ADDITIONS
PULSE: 76
TOTAL SCORE: 2

## 2024-01-05 ASSESSMENT — PAIN - FUNCTIONAL ASSESSMENT
PAIN_FUNCTIONAL_ASSESSMENT: 0-10

## 2024-01-05 ASSESSMENT — PAIN SCALES - GENERAL
PAINLEVEL_OUTOF10: 0 - NO PAIN
PAINLEVEL_OUTOF10: 0 - NO PAIN

## 2024-01-05 NOTE — CARE PLAN
The patient's goals for the shift include      The clinical goals for the shift include see care plan      Problem: Fall/Injury  Goal: Not fall by end of shift  Outcome: Progressing  Goal: Be free from injury by end of the shift  Outcome: Progressing  Goal: Verbalize understanding of personal risk factors for fall in the hospital  Outcome: Progressing  Goal: Verbalize understanding of risk factor reduction measures to prevent injury from fall in the home  Outcome: Progressing  Goal: Use assistive devices by end of the shift  Outcome: Progressing  Goal: Pace activities to prevent fatigue by end of the shift  Outcome: Progressing     Problem: Diabetes  Goal: Achieve decreasing blood glucose levels by end of shift  Outcome: Progressing  Goal: Increase stability of blood glucose readings by end of shift  Outcome: Progressing  Goal: Decrease in ketones present in urine by end of shift  Outcome: Progressing  Goal: Maintain electrolyte levels within acceptable range throughout shift  Outcome: Progressing  Goal: Maintain glucose levels >70mg/dl to <250mg/dl throughout shift  Outcome: Progressing  Goal: No changes in neurological exam by end of shift  Outcome: Progressing  Goal: Learn about and adhere to nutrition recommendations by end of shift  Outcome: Progressing  Goal: Vital signs within normal range for age by end of shift  Outcome: Progressing  Goal: Increase self care and/or family involovement by end of shift  Outcome: Progressing  Goal: Receive DSME education by end of shift  Outcome: Progressing     Problem: Pain - Adult  Goal: Verbalizes/displays adequate comfort level or baseline comfort level  Outcome: Progressing     Problem: Safety - Adult  Goal: Free from fall injury  Outcome: Progressing     Problem: Discharge Planning  Goal: Discharge to home or other facility with appropriate resources  Outcome: Progressing     Problem: Chronic Conditions and Co-morbidities  Goal: Patient's chronic conditions and  co-morbidity symptoms are monitored and maintained or improved  Outcome: Progressing     Problem: Skin  Goal: Decreased wound size/increased tissue granulation at next dressing change  Outcome: Progressing  Flowsheets (Taken 1/5/2024 0429)  Decreased wound size/increased tissue granulation at next dressing change: Promote sleep for wound healing  Goal: Participates in plan/prevention/treatment measures  Outcome: Progressing

## 2024-01-05 NOTE — PROGRESS NOTES
MRN: [id number]  SERVICE DATE:  1/5/2024   SERVICE TIME:  8:53 AM  Today's Date: 01/05/24  Admission Date: 12/29/2023  Hospital Day: #7    Subjective      Patient seen today resting comfortably he is slowly voiding.  Kidney function slowly improving        Objective      ROS:   General: Denies any change in weight, fever, chills, fatigue.   Head and Neck: Denies any headaches, syncope or history of head injury.   Eyes/Ears: Denies any cataracts, glaucoma, blurred vision, tinnitus.   Nose: Denies any epistaxis or sinus problems   Respiratory: Denies any cough, hemoptysis, wheezing, asthma, dyspnea.   Cardiovascular: No orthopnea, dyspnea, palpitations, congestive heart failure.   Gastrointestinal: Denies any indigestion, nausea, vomiting, diarrhea, constipation.   Neuro: No dizzyness, headache or syncope   ID: No fever or chills.   Endocrine: No weight loss or weight gain.  No thyroid or diabetic complaints     MEDICATIONS:  Current Facility-Administered Medications   Medication Dose Route Frequency Provider Last Rate Last Admin    amLODIPine (Norvasc) tablet 10 mg  10 mg oral Daily Abi Bañuelos APRN-CNP   10 mg at 01/05/24 0834    buprenorphine-naloxone (Suboxone) 8-2 mg per SL tablet 2 tablet  2 tablet sublingual Daily Abi Bañuelos APRN-CNP   2 tablet at 01/05/24 0833    diazePAM (Valium) tablet 5 mg  5 mg oral Daily Corrina R Cavazos, APRN-CNP   5 mg at 01/05/24 0833    enoxaparin (Lovenox) syringe 40 mg  40 mg subcutaneous q24h Nikky Mei APRN-CNP   40 mg at 01/04/24 1354    folic acid (Folvite) tablet 1 mg  1 mg oral Daily Nikky Mei, APRN-CNP   1 mg at 01/05/24 0834    gabapentin (Neurontin) capsule 300 mg  300 mg oral TID Abi Bañuelos APRN-CNP   300 mg at 01/05/24 0833    LORazepam (Ativan) tablet 2 mg  2 mg oral q2h PRN Corrina R Cavazos, APRN-CNP   2 mg at 01/03/24 1221    Or    LORazepam (Ativan) tablet 1 mg  1 mg oral q2h PRN Corrina R Cavazos, APRN-CNP   1 mg at 01/03/24 2010    Or  "   LORazepam (Ativan) tablet 0.5 mg  0.5 mg oral q2h PRN MAILE Sharma   0.5 mg at 01/04/24 2019    [Held by provider] losartan 100 mg, hydroCHLOROthiazide 12.5 mg for Hyzaar 100/12.5   oral Daily Nikky M HANNAH MeiN-CNP   Given at 01/03/24 0827    multivitamin with minerals 1 tablet  1 tablet oral Daily Nikky MARTINEZ HANNAH MeiN-CNP   1 tablet at 01/05/24 0833    nicotine (Nicoderm CQ) 14 mg/24 hr patch 1 patch  1 patch transdermal Daily Nikky MARTINEZ HANNAH MeiN-CNP   1 patch at 01/05/24 0832    Followed by    [START ON 2/9/2024] nicotine (Nicoderm CQ) 7 mg/24 hr patch 1 patch  1 patch transdermal Daily Nikky M HANNAH MeiN-CNP        ondansetron (Zofran) injection 4 mg  4 mg intravenous q6h PRN Nikky M DIMITRI Mei-CNP   4 mg at 01/04/24 1535    pantoprazole (ProtoNix) EC tablet 40 mg  40 mg oral Daily before breakfast Nikky MARTINEZ HANNAH MeiN-CNP   40 mg at 01/05/24 0540    polyethylene glycol (Glycolax, Miralax) packet 17 g  17 g oral Daily PRN Nikky M HANNAH MieN-CNP        QUEtiapine (SEROquel) tablet 25 mg  25 mg oral Nightly Nikky M HANNAH MeiN-CNP   25 mg at 01/04/24 2019    thiamine (Vitamin B-1) tablet 100 mg  100 mg oral Daily DIMITRI Sims-CNP   100 mg at 01/05/24 0833    venlafaxine XR (Effexor-XR) 24 hr capsule 187.5 mg  187.5 mg oral Daily Brown Babb MD   187.5 mg at 01/05/24 0833         PHYSICAL EXAM:   /70 (BP Location: Left arm, Patient Position: Lying)   Pulse 71   Temp 35.7 °C (96.3 °F) (Temporal)   Resp 15   Ht 1.88 m (6' 2\")   Wt 132 kg (291 lb 7.2 oz)   SpO2 97%   BMI 37.42 kg/m²      CONSTITUTIONAL - well nourished, well developed, looks like stated age, in no acute distress, not ill-appearing   SKIN - normal skin color and pigmentation, normal skin turgor without rash   HEAD - no trauma, normocephalic  EYES - pupils are equal and reactive to light, extraocular muscles are intact, and normal external exam  ENT - TM's intact, no injection, no signs of " "infection, uvula midline, normal tongue movement and throat normal  NECK - supple without rigidity, no neck mass was observed, no thyromegaly    CHEST - clear to auscultation, no wheezing, no crackles and no rales, good effort  CARDIAC - regular rate and regular rhythm, no skipped beats, no murmur  ABDOMEN - no organomegaly, soft, nontender, nondistended, normal bowel sounds   NEUROLOGICAL - normal gait, normal balance, normal motor, no ataxia, DTRs equal and symmetrical; alert, oriented and no focal signs  PSYCHIATRIC - alert, pleasant and cordial, age-appropriate  IMMUNOLOGIC - no cervical lymphadenopathy  Generalized muscular weakness    Labs:  Lab Results   Component Value Date    WBC 7.5 01/05/2024    HGB 15.4 01/05/2024    HCT 45.9 01/05/2024     (H) 01/05/2024     01/05/2024     Lab Results   Component Value Date    GLUCOSE 112 (H) 01/05/2024    CALCIUM 9.1 01/05/2024     (L) 01/05/2024    K 4.2 01/05/2024    CO2 26 01/05/2024    CL 98 01/05/2024    BUN 56 (H) 01/05/2024    CREATININE 3.39 (H) 01/05/2024   ESR: --No results found for: \"SEDRATE\"No results found for: \"CRP\"  Lab Results   Component Value Date    ALT 91 (H) 01/05/2024    AST 43 (H) 01/05/2024    ALKPHOS 77 01/05/2024    BILITOT 0.5 01/05/2024                      Problem List Items Addressed This Visit             ICD-10-CM    * (Principal) Alcohol withdrawal syndrome, uncomplicated (CMS/[facility]) - Primary F10.930     Other Visit Diagnoses         Codes    Chest pain, unspecified type     R07.9    Other specified diabetes mellitus with other specified complication, without long-term current use of insulin (CMS/[facility])     E13.69    Transaminitis     R74.01    Alcohol abuse     F10.10          Acute kidney injury    Continue current medications  Awaiting final consult review  Monitor kidney function  Monitor intake I's and O's  Nephrology consult pending              David Chadwick MD  This note was created using [employer]. Any " inadvertent grammar, spelling or syntax errors are do to voice recognition software error and are not intentional.

## 2024-01-05 NOTE — CONSULTS
INPATIENT NEPHROLOGY CONSULT NOTE        CONSULT: Nephrology SERVICE    PATIENT NAME: Deyvi Araujo    MRN: [id number]  DATE of SERVICE: January 5, 2024  TIME of SERVICE: 9:06 AM      REASON FOR CONSULT: HÉCTOR  REQUESTING PHYSICIAN: Dr. Reyna  PRIMARY CARE PHYSICIAN: No primary care provider on file.    HPI:  Mr. Araujo is a 43 y.o. male who presents to [facility] December 29, 2023 for evaluation of nausea, vomiting, chest pain, anxiety.    Patient with medical history significant for alcohol abuse, posttraumatic stress disorder.     Course complicated with acute kidney injury serum creatinine started to uptrend from 0.8 mg deciliter on presentation up to 1.1 mg/dL on January 2 and up to 3.6 mg/dL January 4th.  With drop in GFR to 21 mill per minute per 1.73 m², hyponatremia sodium level 131.    Vitals noted for intermittent episodes of hypotension with systolic blood pressure low 80/50 intermittent episodes of tachycardia heart rate in the upper 90s.  Afebrile sats well on room air.    Chest x-ray unremarkable for pleural effusions or pulmonary edema.  Lumbar spine images no evidence of fracture or infection demonstrated degenerative changes.  Kidney ultrasound January 4 unremarkable for hydronephrosis right kidney 11.5 left kidney 12.7     Home medications including losartan/hydrochlorothiazide 100/12.5 mg, amlodipine 10 mg, Suboxone, Effexor.    Inpatient treatment: Received 1 dose of ibuprofen.  LR bolus 1 L January 1.  Losartan/hydrochlorothiazide given until January 3.    ASSESSMENT AND PLAN:  Acute kidney injury likely hemodynamically mediated in the setting of hypotension with concomitant hydrochlorothiazide/losartan use.  -- Baseline serum creatinine 0.9 mg deciliter EGFR 95 mill per minute per 1.73 m².  -- Peak serum creatinine to 3.5 mg/dL with drop in GFR to 21 mill per minute per 1.73 m²  -- No signs of obstruction ultrasound  without hydronephrosis  -- No contrast exposure  -- No nephrotoxic medication    Hyponatremia likely hypovolemic    Hypotension likely secondary to increased insensible losses in the setting of alcohol withdrawal symptoms.    Volume: No signs of volume overload.  Patient had a few days of increased insensible losses due to alcohol withdrawal    Alcohol withdrawal symptoms treated with CIWA protocol    Polysubstance abuse with substance-induced mood disorder evaluated by psychiatry with recommendations to continue Effexor      Plan:  Hemodynamically mediated acute kidney injury with peak serum creatinine 3.5 mg/dL in the setting of hypotension with concomitant hydrochlorothiazide and losartan use.      To continue to hold hydrochlorothiazide and losartan.    To give 1 L of LR at 100 cc/h.  Education provided to minimize alcohol intake.  Education provided regarding compliance with outpatient medication, patient reports that he does not take his medications regularly.    Strict input and output to guide volume management.  To minimize NSAIDs use  No uremic symptoms, no indication for renal replacement therapy.  To renally dose gabapentin to minimize encephalopathy    Will follow closely, thank you!    I sincerely, thank you Dr. Reyna for this opportunity to participate in the care of your patient, I will follow from Nephrology point view!    PAST MEDICAL HISTORY:    Past Medical History:   Diagnosis Date    Alcohol abuse     Anxiety and depression     GERD (gastroesophageal reflux disease)     HLD (hyperlipidemia)     HTN (hypertension)     Obesity (BMI 30.0-34.9)     Polysubstance abuse (CMS/HCC)     PTSD (post-traumatic stress disorder)        PAST SURGICAL HISTORY:  No past surgical history on file.    FAMILY HISTORY:    Family History   Problem Relation Name Age of Onset    Alcohol abuse Father         SOCIAL HISTORY:    Social History     Tobacco Use    Smoking status: Every Day     Packs/day: .5     Types:  Cigarettes    Smokeless tobacco: Never   Substance Use Topics    Drug use: Not Currently       MEDICATIONS:  Prior to Admission Medications:  Medications Prior to Admission   Medication Sig Dispense Refill Last Dose    amLODIPine (Norvasc) 10 mg tablet Take 1 tablet (10 mg) by mouth once daily.   12/28/2023 at AM    buprenorphine-naloxone (Suboxone) 8-2 mg SL tablet Place 2 tablets under the tongue once daily.   12/29/2023 at AM    gabapentin (Neurontin) 600 mg tablet Take 1 tablet (600 mg) by mouth 3 times a day.   12/28/2023 at PM    hydrOXYzine HCL (Atarax) 25 mg tablet Take 1 tablet (25 mg) by mouth 3 times a day.   12/28/2023 at PM    losartan-hydrochlorothiazide (Hyzaar) 100-12.5 mg tablet Take 1 tablet by mouth once daily.   12/28/2023 at AM    omeprazole (PriLOSEC) 20 mg DR capsule Take 1 capsule (20 mg) by mouth once daily in the morning. Take before meals.   12/28/2023 at AM    venlafaxine XR (Effexor-XR) 150 mg 24 hr capsule Take 1 capsule (150 mg) by mouth once daily.   12/28/2023 at AM       INPATIENT MEDICATIONS:    Current Facility-Administered Medications:     amLODIPine (Norvasc) tablet 10 mg, 10 mg, oral, Daily, DIMITRI Cohen-CNP, 10 mg at 01/05/24 0834    buprenorphine-naloxone (Suboxone) 8-2 mg per SL tablet 2 tablet, 2 tablet, sublingual, Daily, DIMITRI Cohen-CNP, 2 tablet at 01/05/24 0833    diazePAM (Valium) tablet 5 mg, 5 mg, oral, Daily, Corrina Cavazos APRN-CNP, 5 mg at 01/05/24 0833    enoxaparin (Lovenox) syringe 40 mg, 40 mg, subcutaneous, q24h, DIMITRI Gibson-CNP, 40 mg at 01/04/24 1354    folic acid (Folvite) tablet 1 mg, 1 mg, oral, Daily, DIMITRI Gibson-CNP, 1 mg at 01/05/24 0834    gabapentin (Neurontin) capsule 300 mg, 300 mg, oral, TID, DIMITRI Cohen-CNP, 300 mg at 01/05/24 0833    LORazepam (Ativan) tablet 2 mg, 2 mg, oral, q2h PRN, 2 mg at 01/03/24 1221 **OR** LORazepam (Ativan) tablet 1 mg, 1 mg, oral, q2h PRN, 1 mg at 01/03/24 2010  **OR** LORazepam (Ativan) tablet 0.5 mg, 0.5 mg, oral, q2h PRN, DIMITRI Sharma-CNP, 0.5 mg at 01/04/24 2019    [Held by provider] losartan 100 mg, hydroCHLOROthiazide 12.5 mg for Hyzaar 100/12.5, , oral, Daily, DIMITRI Gibson-CNP, Given at 01/03/24 0827    multivitamin with minerals 1 tablet, 1 tablet, oral, Daily, DIMITRI Gibson-CNP, 1 tablet at 01/05/24 0833    nicotine (Nicoderm CQ) 14 mg/24 hr patch 1 patch, 1 patch, transdermal, Daily, 1 patch at 01/05/24 0832 **FOLLOWED BY** [START ON 2/9/2024] nicotine (Nicoderm CQ) 7 mg/24 hr patch 1 patch, 1 patch, transdermal, Daily, DIMITRI Gibson-CNP    ondansetron (Zofran) injection 4 mg, 4 mg, intravenous, q6h PRN, DIMITRI Gibson-CNP, 4 mg at 01/04/24 1535    pantoprazole (ProtoNix) EC tablet 40 mg, 40 mg, oral, Daily before breakfast, DIMITRI Gibson-CNP, 40 mg at 01/05/24 0540    polyethylene glycol (Glycolax, Miralax) packet 17 g, 17 g, oral, Daily PRN, Nikky Mei APRN-CNP    QUEtiapine (SEROquel) tablet 25 mg, 25 mg, oral, Nightly, DIMITRI Gibson-CNP, 25 mg at 01/04/24 2019    thiamine (Vitamin B-1) tablet 100 mg, 100 mg, oral, Daily, Yvonne Wakefield, DIMITRI-CNP, 100 mg at 01/05/24 0833    venlafaxine XR (Effexor-XR) 24 hr capsule 187.5 mg, 187.5 mg, oral, Daily, Brown Babb MD, 187.5 mg at 01/05/24 0833    ALLERGIES:  No Known Allergies    COMPLETE REVIEW OF SYSTEMS:    A comprehensive 14 point review of systems was obtained.  Constitutional: Reports weakness, fatigue  HENT: Negative for congestion and sore throat.    Eyes: Negative for pain and visual disturbance.  Respiratory: Negative for cough and shortness of breath.    Cardiovascular: Negative for chest pain and palpitations.  Gastrointestinal: Reports nausea and vomiting  Genitourinary: Reports urgency and frequency  Musculoskeletal: Negative for back pain and myalgias.  Skin: negative for wound. Negative for color change and rash.  Neurological:  Negative for weakness and headaches.  Hematological: Negative for adenopathy. Does not bruise/bleed easily.  Psychiatric/Behavioral: Negative for agitation and confusion.  All other reviewed and negative other than HPI.    PHYSICAL EXAM: Physical exam performed.  Patient Vitals for the past 24 hrs:   BP Temp Temp src Pulse Resp SpO2   01/05/24 0800 113/70 35.7 °C (96.3 °F) Temporal 71 15 97 %   01/05/24 0400 115/75 35.8 °C (96.4 °F) Temporal 96 18 --   01/05/24 0000 125/64 -- -- 76 -- --   01/04/24 2000 126/73 36.6 °C (97.9 °F) Temporal 82 14 97 %   01/04/24 1600 105/70 35.8 °C (96.4 °F) Temporal 93 16 95 %   01/04/24 1200 123/61 -- -- 71 -- --   01/04/24 1000 118/62 -- -- 81 -- --     Body mass index is 37.42 kg/m².    CONSTITUTIONAL:  Vital signs reviewed.  Relative episodes of hypotension  GENERAL: Well developed, well nourished.  With facial plethora  SKIN: Facial plethora  HEAD/SINUSES: Atraumatic  EYES: PERRLA, + pallor  OROPHARYNX: Dry mucous membranes  NECK: no  jugulovenous distention, No carotid bruits, Carotid pulse normal contour, Supple  LUNGS: Diminished air entry bilaterally, no Rales  CARDIAC: distant S1 and S2; no rubs, murmurs, or gallops  ABDOMEN: Abdomen soft, non-tender, BS normal, distended  EXTREMITIES: Good capillary refill, no edema.  NEUROLOGIC: Awake, alert and oriented ×3, No focal deficit  HEMATOLOGIC/Lymphatic/Immunologic: No abnormal bleeding, echymosis, inflammation. No cervical or supraclavicular lymphadenopathy.    Intake/Output last 3 shifts:  I/O last 3 completed shifts:  In: 1180 (8.9 mL/kg) [P.O.:1180]  Out: 1820 (13.8 mL/kg) [Urine:1820 (0.4 mL/kg/hr)]  Weight: 132.2 kg     Diagnostic tests reviewed for today's visit:    CBC, Coags, RNP, Mg, Phos   Results from last 7 days   Lab Units 01/05/24  0607   WBC AUTO x10*3/uL 7.5   RBC AUTO x10*6/uL 4.36*   HEMOGLOBIN g/dL 15.4   HEMATOCRIT % 45.9     Results from last 7 days   Lab Units 01/05/24  0607 01/04/24  0548 01/03/24  0628  "  SODIUM mmol/L 133*   < > 133*   POTASSIUM mmol/L 4.2   < > 3.8   CHLORIDE mmol/L 98   < > 97*   CO2 mmol/L 26   < > 26   BUN mg/dL 56*   < > 26*   CREATININE mg/dL 3.39*   < > 1.17   CALCIUM mg/dL 9.1   < > 9.6   MAGNESIUM mg/dL  --   --  2.59*   BILIRUBIN TOTAL mg/dL 0.5   < > 0.7   ALT U/L 91*   < > 129*   AST U/L 43*   < > 75*    < > = values in this interval not displayed.     Results from last 7 days   Lab Units 01/04/24 2031   COLOR U  Yellow   APPEARANCE U  Clear   PH U  5.0   SPEC GRAV UR  1.009   PROTEIN U mg/dL NEGATIVE   BLOOD UR  NEGATIVE   NITRITE U  NEGATIVE     IMAGING: CXR reviewed in  images.      SIGNATURE: Mari Yanez MD  Nephrology and Hypertension  [address] [place]  Office phone: [phone]  FAX: [phone]      This note was partially created using voice recognition software and is inherently subject to errors including those of syntax and \"sound-alike\" substitutions which may escape proofreading.  In such instances, original meaning may be extrapolated by contextual derivation.    "

## 2024-01-05 NOTE — PROGRESS NOTES
Patient is asking about getting his medications refilled prior to discharge.  He has missed his appointment with Dr. Navarro and he now on vacation. He will need Rx for gabapentin, atarax and Effexor. Plan Meds to bed to be ordered  Latesha Toro RN

## 2024-01-05 NOTE — CONSULTS
"Reason For Consult  incontinence    History Of Present Illness  Deyvi Araujo is a 43 y.o. male presenting with anxiety and hx of substance abuse has been experinceing urinary frequency urgency and urge incontinence     Past Medical History  He has a past medical history of Alcohol abuse, Anxiety and depression, GERD (gastroesophageal reflux disease), HLD (hyperlipidemia), HTN (hypertension), Obesity (BMI 30.0-34.9), Polysubstance abuse (CMS/HCC), and PTSD (post-traumatic stress disorder).    Surgical History  He has no past surgical history on file.     Social History  He reports that he has been smoking cigarettes. He has been smoking an average of .5 packs per day. He has never used smokeless tobacco. He reports that he does not currently use drugs. No history on file for alcohol use.    Family History  Family History   Problem Relation Name Age of Onset    Alcohol abuse Father          Allergies  Patient has no known allergies.    Review of Systems  No prior gu eval     Physical Exam  Soft abdomen     Last Recorded Vitals  Blood pressure 105/70, pulse 93, temperature 35.8 °C (96.4 °F), temperature source Temporal, resp. rate 16, height 1.88 m (6' 2\"), weight 132 kg (291 lb 7.2 oz), SpO2 95 %.    Relevant Results       Assessment/Plan     Will check pvr if emptying ok can follow up as out pt    I spent 15 minutes in the professional and overall care of this patient.      Yrn King MD    "

## 2024-01-05 NOTE — NURSING NOTE
Patient remains on CIWA q4 hours. Ativan required once at HS, no medications needed for remainder of night. Pt urinating without difficulty. Call light in reach, safety maintained.

## 2024-01-06 LAB
ALBUMIN SERPL BCP-MCNC: 3.8 G/DL (ref 3.4–5)
ALP SERPL-CCNC: 68 U/L (ref 33–120)
ALT SERPL W P-5'-P-CCNC: 79 U/L (ref 10–52)
ANION GAP SERPL CALC-SCNC: 17 MMOL/L (ref 10–20)
AST SERPL W P-5'-P-CCNC: 42 U/L (ref 9–39)
BILIRUB SERPL-MCNC: 0.4 MG/DL (ref 0–1.2)
BUN SERPL-MCNC: 47 MG/DL (ref 6–23)
CALCIUM SERPL-MCNC: 9.3 MG/DL (ref 8.6–10.3)
CHLORIDE SERPL-SCNC: 100 MMOL/L (ref 98–107)
CHLORIDE UR-SCNC: 71 MMOL/L
CHLORIDE/CREATININE (MMOL/G) IN URINE: 105 MMOL/G CREAT (ref 23–275)
CO2 SERPL-SCNC: 22 MMOL/L (ref 21–32)
CREAT SERPL-MCNC: 2.22 MG/DL (ref 0.5–1.3)
CREAT UR-MCNC: 67.7 MG/DL (ref 20–370)
ERYTHROCYTE [DISTWIDTH] IN BLOOD BY AUTOMATED COUNT: 12.5 % (ref 11.5–14.5)
GFR SERPL CREATININE-BSD FRML MDRD: 37 ML/MIN/1.73M*2
GLUCOSE SERPL-MCNC: 101 MG/DL (ref 74–99)
HCT VFR BLD AUTO: 47.6 % (ref 41–52)
HGB BLD-MCNC: 15.6 G/DL (ref 13.5–17.5)
MCH RBC QN AUTO: 35.1 PG (ref 26–34)
MCHC RBC AUTO-ENTMCNC: 32.8 G/DL (ref 32–36)
MCV RBC AUTO: 107 FL (ref 80–100)
NRBC BLD-RTO: 0 /100 WBCS (ref 0–0)
PLATELET # BLD AUTO: 216 X10*3/UL (ref 150–450)
POTASSIUM SERPL-SCNC: 4.6 MMOL/L (ref 3.5–5.3)
PROT SERPL-MCNC: 7.3 G/DL (ref 6.4–8.2)
PROT UR-ACNC: 6 MG/DL (ref 5–25)
PROT/CREAT UR: 0.09 MG/MG CREAT (ref 0–0.17)
RBC # BLD AUTO: 4.45 X10*6/UL (ref 4.5–5.9)
SODIUM SERPL-SCNC: 134 MMOL/L (ref 136–145)
SODIUM UR-SCNC: 63 MMOL/L
SODIUM/CREAT UR-RTO: 93 MMOL/G CREAT
WBC # BLD AUTO: 6 X10*3/UL (ref 4.4–11.3)

## 2024-01-06 PROCEDURE — 2500000001 HC RX 250 WO HCPCS SELF ADMINISTERED DRUGS (ALT 637 FOR MEDICARE OP): Performed by: NURSE PRACTITIONER

## 2024-01-06 PROCEDURE — 84300 ASSAY OF URINE SODIUM: CPT | Performed by: INTERNAL MEDICINE

## 2024-01-06 PROCEDURE — S4991 NICOTINE PATCH NONLEGEND: HCPCS | Performed by: NURSE PRACTITIONER

## 2024-01-06 PROCEDURE — 82436 ASSAY OF URINE CHLORIDE: CPT | Performed by: INTERNAL MEDICINE

## 2024-01-06 PROCEDURE — 1200000002 HC GENERAL ROOM WITH TELEMETRY DAILY

## 2024-01-06 PROCEDURE — 36415 COLL VENOUS BLD VENIPUNCTURE: CPT | Performed by: NURSE PRACTITIONER

## 2024-01-06 PROCEDURE — 84156 ASSAY OF PROTEIN URINE: CPT | Performed by: INTERNAL MEDICINE

## 2024-01-06 PROCEDURE — 2500000002 HC RX 250 W HCPCS SELF ADMINISTERED DRUGS (ALT 637 FOR MEDICARE OP, ALT 636 FOR OP/ED): Performed by: NURSE PRACTITIONER

## 2024-01-06 PROCEDURE — 2500000004 HC RX 250 GENERAL PHARMACY W/ HCPCS (ALT 636 FOR OP/ED): Performed by: NURSE PRACTITIONER

## 2024-01-06 PROCEDURE — 2500000004 HC RX 250 GENERAL PHARMACY W/ HCPCS (ALT 636 FOR OP/ED): Performed by: PSYCHIATRY & NEUROLOGY

## 2024-01-06 PROCEDURE — 80053 COMPREHEN METABOLIC PANEL: CPT | Performed by: NURSE PRACTITIONER

## 2024-01-06 PROCEDURE — 2500000004 HC RX 250 GENERAL PHARMACY W/ HCPCS (ALT 636 FOR OP/ED): Performed by: INTERNAL MEDICINE

## 2024-01-06 PROCEDURE — 2500000001 HC RX 250 WO HCPCS SELF ADMINISTERED DRUGS (ALT 637 FOR MEDICARE OP): Performed by: PSYCHIATRY & NEUROLOGY

## 2024-01-06 PROCEDURE — 85027 COMPLETE CBC AUTOMATED: CPT | Performed by: NURSE PRACTITIONER

## 2024-01-06 RX ORDER — SODIUM CHLORIDE, SODIUM LACTATE, POTASSIUM CHLORIDE, CALCIUM CHLORIDE 600; 310; 30; 20 MG/100ML; MG/100ML; MG/100ML; MG/100ML
100 INJECTION, SOLUTION INTRAVENOUS CONTINUOUS
Status: ACTIVE | OUTPATIENT
Start: 2024-01-06 | End: 2024-01-06

## 2024-01-06 RX ORDER — TALC
3 POWDER (GRAM) TOPICAL NIGHTLY PRN
Status: DISCONTINUED | OUTPATIENT
Start: 2024-01-06 | End: 2024-01-07 | Stop reason: HOSPADM

## 2024-01-06 RX ADMIN — VENLAFAXINE HYDROCHLORIDE 187.5 MG: 75 CAPSULE, EXTENDED RELEASE ORAL at 08:49

## 2024-01-06 RX ADMIN — FOLIC ACID 1 MG: 1 TABLET ORAL at 08:49

## 2024-01-06 RX ADMIN — SODIUM CHLORIDE, POTASSIUM CHLORIDE, SODIUM LACTATE AND CALCIUM CHLORIDE 100 ML/HR: 600; 310; 30; 20 INJECTION, SOLUTION INTRAVENOUS at 02:17

## 2024-01-06 RX ADMIN — GABAPENTIN 300 MG: 300 CAPSULE ORAL at 14:41

## 2024-01-06 RX ADMIN — QUETIAPINE FUMARATE 25 MG: 25 TABLET ORAL at 21:50

## 2024-01-06 RX ADMIN — GABAPENTIN 300 MG: 300 CAPSULE ORAL at 21:50

## 2024-01-06 RX ADMIN — GABAPENTIN 300 MG: 300 CAPSULE ORAL at 08:49

## 2024-01-06 RX ADMIN — DIAZEPAM 5 MG: 5 TABLET ORAL at 08:48

## 2024-01-06 RX ADMIN — ENOXAPARIN SODIUM 40 MG: 40 INJECTION SUBCUTANEOUS at 12:23

## 2024-01-06 RX ADMIN — THIAMINE HCL TAB 100 MG 100 MG: 100 TAB at 08:49

## 2024-01-06 RX ADMIN — Medication 1 TABLET: at 08:49

## 2024-01-06 RX ADMIN — NICOTINE 1 PATCH: 14 PATCH, EXTENDED RELEASE TRANSDERMAL at 08:48

## 2024-01-06 RX ADMIN — BUPRENORPHINE AND NALOXONE 2 TABLET: 8; 2 TABLET SUBLINGUAL at 08:48

## 2024-01-06 RX ADMIN — SODIUM CHLORIDE, POTASSIUM CHLORIDE, SODIUM LACTATE AND CALCIUM CHLORIDE 100 ML/HR: 600; 310; 30; 20 INJECTION, SOLUTION INTRAVENOUS at 14:41

## 2024-01-06 RX ADMIN — AMLODIPINE BESYLATE 10 MG: 10 TABLET ORAL at 08:49

## 2024-01-06 RX ADMIN — Medication 3 MG: at 21:50

## 2024-01-06 RX ADMIN — SODIUM CHLORIDE, POTASSIUM CHLORIDE, SODIUM LACTATE AND CALCIUM CHLORIDE 100 ML/HR: 600; 310; 30; 20 INJECTION, SOLUTION INTRAVENOUS at 11:58

## 2024-01-06 RX ADMIN — PANTOPRAZOLE SODIUM 40 MG: 40 TABLET, DELAYED RELEASE ORAL at 06:10

## 2024-01-06 ASSESSMENT — LIFESTYLE VARIABLES
TACTILE DISTURBANCES: VERY MILD ITCHING, PINS AND NEEDLES, BURNING OR NUMBNESS
AGITATION: NORMAL ACTIVITY
TOTAL SCORE: 0
AUDITORY DISTURBANCES: NOT PRESENT
BLOOD PRESSURE: 135/64
PULSE: 73
TOTAL SCORE: 2
PULSE: 84
AUDITORY DISTURBANCES: NOT PRESENT
PAROXYSMAL SWEATS: NO SWEAT VISIBLE
NAUSEA AND VOMITING: NO NAUSEA AND NO VOMITING
HEADACHE, FULLNESS IN HEAD: NOT PRESENT
NAUSEA AND VOMITING: NO NAUSEA AND NO VOMITING
VISUAL DISTURBANCES: NOT PRESENT
PAROXYSMAL SWEATS: NO SWEAT VISIBLE
ORIENTATION AND CLOUDING OF SENSORIUM: ORIENTED AND CAN DO SERIAL ADDITIONS
PAROXYSMAL SWEATS: NO SWEAT VISIBLE
ORIENTATION AND CLOUDING OF SENSORIUM: ORIENTED AND CAN DO SERIAL ADDITIONS
VISUAL DISTURBANCES: NOT PRESENT
HEADACHE, FULLNESS IN HEAD: NOT PRESENT
AGITATION: NORMAL ACTIVITY
VISUAL DISTURBANCES: NOT PRESENT
ANXIETY: MILDLY ANXIOUS
AUDITORY DISTURBANCES: NOT PRESENT
ANXIETY: NO ANXIETY, AT EASE
AGITATION: NORMAL ACTIVITY
ORIENTATION AND CLOUDING OF SENSORIUM: ORIENTED AND CAN DO SERIAL ADDITIONS
HEADACHE, FULLNESS IN HEAD: NOT PRESENT
ANXIETY: NO ANXIETY, AT EASE
PULSE: 83
NAUSEA AND VOMITING: NO NAUSEA AND NO VOMITING
TOTAL SCORE: 0
BLOOD PRESSURE: 131/60
TREMOR: NO TREMOR
BLOOD PRESSURE: 162/83

## 2024-01-06 ASSESSMENT — COGNITIVE AND FUNCTIONAL STATUS - GENERAL
MOBILITY SCORE: 24
MOBILITY SCORE: 24
DAILY ACTIVITIY SCORE: 24
MOBILITY SCORE: 24
DAILY ACTIVITIY SCORE: 24
DAILY ACTIVITIY SCORE: 24

## 2024-01-06 ASSESSMENT — PAIN SCALES - GENERAL
PAINLEVEL_OUTOF10: 0 - NO PAIN
PAINLEVEL_OUTOF10: 0 - NO PAIN

## 2024-01-06 ASSESSMENT — PAIN - FUNCTIONAL ASSESSMENT: PAIN_FUNCTIONAL_ASSESSMENT: 0-10

## 2024-01-06 NOTE — CARE PLAN
The patient's goals for the shift include      The clinical goals for the shift include See care plan taken 1/6    Pt has done well throughout shift.  CIWAS have been Dc'd.  Kidney function continues to improve.  LR running.  Safety maintained.

## 2024-01-06 NOTE — NURSING NOTE
Patient is A&OX4 patient is up independent. Patient is back on  CIWA q2 hours.  Do to change in CIWA score on day shift.  no medications needed for remainder of night. Pt urinating without difficulty. Call light in reach, safety maintained.

## 2024-01-06 NOTE — PROGRESS NOTES
INPATIENT NEPHROLOGY CONSULT PROGRESS NOTE      Patient Name: Deyvi Araujo MRN: [id number]  DATE of SERVICE: January 6, 2024  TIME of SERVICE: 2:19 PM  CONSULTING SERVICE: Nephrology    REASON for CONSULT: HÉCTOR    SUBJECTIVE:  Seen and evaluated at bedside.  Feeling much better today.  Blood pressure stable 118/74 with a heart rate of 83 sats well on room air.  Tolerated 1 L of LR.  Sodium level up to 134 from 131.  Serum creatinine down to 2.2 from 3.4.  Tolerating amlodipine 10 mg with holding parameters.  Losartan hydrochlorothiazide on hold.  To continue to encourage oral intake  Tentative plan to discontinue LR after 1 more liter    SUMMARY OF STAY:  Mr. Araujo is a 43 y.o. male who presents to [facility] December 29, 2023 for evaluation of nausea, vomiting, chest pain, anxiety.  Patient with medical history significant for alcohol abuse, posttraumatic stress disorder.   Course complicated with acute kidney injury serum creatinine started to uptrend from 0.8 mg deciliter on presentation up to 1.1 mg/dL on January 2 and up to 3.6 mg/dL January 4th.  With drop in GFR to 21 mill per minute per 1.73 m², hyponatremia sodium level 131.    ASSESSMENT:  Acute kidney injury likely hemodynamically mediated in the setting of hypotension with concomitant hydrochlorothiazide/losartan use.  -- Baseline serum creatinine 0.9 mg deciliter EGFR 95 mill per minute per 1.73 m².  -- Peak serum creatinine to 3.5 mg/dL with drop in GFR to 21 mill per minute per 1.73 m²  -- No signs of obstruction ultrasound without hydronephrosis  -- No contrast exposure  -- No nephrotoxic medication  --Improving     Hyponatremia likely hypovolemic  Improving     Hypotension likely secondary to increased insensible losses in the setting of alcohol withdrawal symptoms.     Volume: No signs of volume overload.  Patient had a few days of increased insensible losses due to alcohol  withdrawal     Alcohol withdrawal symptoms treated with [facility] protocol     Polysubstance abuse with substance-induced mood disorder evaluated by psychiatry with recommendations to continue Effexor     Plan:  Hemodynamically mediated acute kidney injury with peak serum creatinine 3.5 mg/dL in the setting of hypotension with concomitant hydrochlorothiazide and losartan use: Improving with serum creatinine down to 2.2 mg deciliter.      To continue to hold hydrochlorothiazide and losartan.    To hold further IV fluid after this liter  Blood pressure controlled with amlodipine 10 mg p.o. daily with holding parameters.    Will follow closely, thank you!      Medications:    Current Facility-Administered Medications:     amLODIPine (Norvasc) tablet 10 mg, 10 mg, oral, Daily, MAILE Cohen, 10 mg at 01/06/24 0849    bisacodyl (Dulcolax) EC tablet 5 mg, 5 mg, oral, Daily PRN, Thania Jackson PA-C, 5 mg at 01/05/24 1528    buprenorphine-naloxone (Suboxone) 8-2 mg per SL tablet 2 tablet, 2 tablet, sublingual, Daily, RONALD CohenCNP, 2 tablet at 01/06/24 0848    diazePAM (Valium) tablet 5 mg, 5 mg, oral, Daily, DIMITRI Sharma-CNP, 5 mg at 01/06/24 0848    enoxaparin (Lovenox) syringe 40 mg, 40 mg, subcutaneous, q24h, DIMITRI Gibson-CNP, 40 mg at 01/06/24 1223    folic acid (Folvite) tablet 1 mg, 1 mg, oral, Daily, MAILE Gibson, 1 mg at 01/06/24 0849    gabapentin (Neurontin) capsule 300 mg, 300 mg, oral, TID, DIMITRI Cohen-CNP, 300 mg at 01/06/24 0849    lactated Ringer's infusion, 100 mL/hr, intravenous, Continuous, Mari Yanez MD, Last Rate: 100 mL/hr at 01/06/24 1158, 100 mL/hr at 01/06/24 1158    [Held by provider] losartan 100 mg, hydroCHLOROthiazide 12.5 mg for Hyzaar 100/12.5, , oral, Daily, MAILE Gibson, Given at 01/03/24 0827    multivitamin with minerals 1 tablet, 1 tablet, oral, Daily, MAILE Gibson, 1 tablet at 01/06/24  0849    nicotine (Nicoderm CQ) 14 mg/24 hr patch 1 patch, 1 patch, transdermal, Daily, 1 patch at 01/06/24 0848 **FOLLOWED BY** [START ON 2/9/2024] nicotine (Nicoderm CQ) 7 mg/24 hr patch 1 patch, 1 patch, transdermal, Daily, Nikky Mei APRN-CNP    ondansetron (Zofran) injection 4 mg, 4 mg, intravenous, q6h PRN, Nikky Mei APRN-CNP, 4 mg at 01/05/24 1655    pantoprazole (ProtoNix) EC tablet 40 mg, 40 mg, oral, Daily before breakfast, Nikky Mei APRN-CNP, 40 mg at 01/06/24 0610    polyethylene glycol (Glycolax, Miralax) packet 17 g, 17 g, oral, Daily PRN, Nikky Mei, APRN-CNP, 17 g at 01/05/24 1528    QUEtiapine (SEROquel) tablet 25 mg, 25 mg, oral, Nightly, Nikky Mei APRN-CNP, 25 mg at 01/05/24 2028    thiamine (Vitamin B-1) tablet 100 mg, 100 mg, oral, Daily, Yvonne Wakefield, APRN-CNP, 100 mg at 01/06/24 0849    venlafaxine XR (Effexor-XR) 24 hr capsule 187.5 mg, 187.5 mg, oral, Daily, Brown Babb MD, 187.5 mg at 01/06/24 0849    PERTINENT ROS:  GENERAL:  positive for fatigue, poor appetite.  No fever/chills  RESPIRATORY:  positive for shortness of breath.  Negative for cough, wheezing.  CARDIOVASCULAR:   Negative for chest pain or palpitation.  GI:  Negative for abdominal pain, diarrhea, heartburn, nausea, vomiting  : Intermittent dysuria    Physical Exam:  Vital signs in last 24 hours:  Temp:  [36 °C (96.8 °F)-36.6 °C (97.9 °F)] 36.1 °C (97 °F)  Heart Rate:  [] 83  Resp:  [14-18] 18  BP: (102-162)/(60-95) 118/74    General: Awake, cooperative, not in acute distress  HEENT:  NCAT,  mucous membranes moist and pink  NECK:  No JVD, no carotid bruit, supple, no cervical mass or thyromegaly  LUNGS;  Diminished breath sounds,  no Rales  CV:  Distant, regular rate and rhythm, no murmurs  ABDOMEN:  abdomen soft, nontender, BS normal, no masses or organomegaly  EDEMA: No lower extremity edema/dependent edema  SKIN:  dry and normal turgor, no clubbing, cyanosis or petechia.  No  daija noted      Intake/Output last 3 shifts:  I/O last 3 completed shifts:  In: 1756.7 (13.3 mL/kg) [P.O.:240; I.V.:1516.7 (11.5 mL/kg)]  Out: 4280 (32.4 mL/kg) [Urine:4280 (0.9 mL/kg/hr)]  Weight: 132.2 kg     DATA:  Diagnotic tests reviewed for Todays visit:  Results from last 7 days   Lab Units 01/06/24  0521   WBC AUTO x10*3/uL 6.0   RBC AUTO x10*6/uL 4.45*   HEMOGLOBIN g/dL 15.6   HEMATOCRIT % 47.6     Results from last 7 days   Lab Units 01/06/24  0521 01/04/24  0548 01/03/24  0628   SODIUM mmol/L 134*   < > 133*   POTASSIUM mmol/L 4.6   < > 3.8   CHLORIDE mmol/L 100   < > 97*   CO2 mmol/L 22   < > 26   BUN mg/dL 47*   < > 26*   CREATININE mg/dL 2.22*   < > 1.17   CALCIUM mg/dL 9.3   < > 9.6   MAGNESIUM mg/dL  --   --  2.59*   BILIRUBIN TOTAL mg/dL 0.4   < > 0.7   ALT U/L 79*   < > 129*   AST U/L 42*   < > 75*    < > = values in this interval not displayed.     Results from last 7 days   Lab Units 01/04/24 2031   COLOR U  Yellow   APPEARANCE U  Clear   PH U  5.0   SPEC GRAV UR  1.009   PROTEIN U mg/dL NEGATIVE   BLOOD UR  NEGATIVE   NITRITE U  NEGATIVE     IMAGING: CXR reviewed in  images      SIGNATURE: Mari Yanez MD  Nephrology and Hypertension  [address] [place]  Office phone: [phone]  FAX: [phone]    This note was partially generated using the Dragon voice recognition system, and there may be some incorrect words, spelling's and punctuation that were not noted in checking the note before saving.

## 2024-01-06 NOTE — PROGRESS NOTES
MRN: [id number]  SERVICE DATE:  1/6/2024   SERVICE TIME:  9:12 AM  Today's Date: 01/06/24  Admission Date: 12/29/2023  Hospital Day: #8    Subjective      Patient seen today resting comfortably going to take him off his CIWA protocol.  He has been seen by nephrology.  Kidney function slowly improving        Objective      ROS:   General: Denies any change in weight, fever, chills, fatigue.   Head and Neck: Denies any headaches, syncope or history of head injury.   Eyes/Ears: Denies any cataracts, glaucoma, blurred vision, tinnitus.   Nose: Denies any epistaxis or sinus problems   Respiratory: Denies any cough, hemoptysis, wheezing, asthma, dyspnea.   Cardiovascular: No orthopnea, dyspnea, palpitations, congestive heart failure.   Gastrointestinal: Denies any indigestion, nausea, vomiting, diarrhea, constipation.   Neuro: No dizzyness, headache or syncope   ID: No fever or chills.   Endocrine: No weight loss or weight gain.  No thyroid or diabetic complaints     MEDICATIONS:  Current Facility-Administered Medications   Medication Dose Route Frequency Provider Last Rate Last Admin    amLODIPine (Norvasc) tablet 10 mg  10 mg oral Daily DIMITRI Cohen-CNP   10 mg at 01/06/24 0849    bisacodyl (Dulcolax) EC tablet 5 mg  5 mg oral Daily PRN Thania Jackson PA-C   5 mg at 01/05/24 1528    buprenorphine-naloxone (Suboxone) 8-2 mg per SL tablet 2 tablet  2 tablet sublingual Daily DIMITRI Cohen-CNP   2 tablet at 01/06/24 0848    diazePAM (Valium) tablet 5 mg  5 mg oral Daily DIMITRI Sharma-CNP   5 mg at 01/06/24 0848    enoxaparin (Lovenox) syringe 40 mg  40 mg subcutaneous q24h MAILE Gibson   40 mg at 01/05/24 1207    folic acid (Folvite) tablet 1 mg  1 mg oral Daily DIMITRI Gibson-CNP   1 mg at 01/06/24 0849    gabapentin (Neurontin) capsule 300 mg  300 mg oral TID DIMITRI Cohen-CNP   300 mg at 01/06/24 0849    lactated Ringer's infusion  100 mL/hr intravenous  "Continuous Mari Yanez  mL/hr at 01/06/24 0638 100 mL/hr at 01/06/24 0638    LORazepam (Ativan) tablet 2 mg  2 mg oral q2h PRN Corrinaeloy Caavzos APRN-CNP   2 mg at 01/05/24 1655    Or    LORazepam (Ativan) tablet 1 mg  1 mg oral q2h PRN Corrinaeloy Cavazos, APRN-CNP   1 mg at 01/03/24 2010    Or    LORazepam (Ativan) tablet 0.5 mg  0.5 mg oral q2h PRN Corrina Cavazos, APRN-CNP   0.5 mg at 01/04/24 2019    [Held by provider] losartan 100 mg, hydroCHLOROthiazide 12.5 mg for Hyzaar 100/12.5   oral Daily DIMITRI Gibson-CNP   Given at 01/03/24 0827    multivitamin with minerals 1 tablet  1 tablet oral Daily Nikky Mei APRN-CNP   1 tablet at 01/06/24 0849    nicotine (Nicoderm CQ) 14 mg/24 hr patch 1 patch  1 patch transdermal Daily DIMITRI Gibson-CNP   1 patch at 01/06/24 0848    Followed by    [START ON 2/9/2024] nicotine (Nicoderm CQ) 7 mg/24 hr patch 1 patch  1 patch transdermal Daily DIMITRI Gibson-CNP        ondansetron (Zofran) injection 4 mg  4 mg intravenous q6h PRN Nikky Mei APRN-CNP   4 mg at 01/05/24 1655    pantoprazole (ProtoNix) EC tablet 40 mg  40 mg oral Daily before breakfast Nikky Mei APRN-CNP   40 mg at 01/06/24 0610    polyethylene glycol (Glycolax, Miralax) packet 17 g  17 g oral Daily PRN Nikky Mei APRN-CNP   17 g at 01/05/24 1528    QUEtiapine (SEROquel) tablet 25 mg  25 mg oral Nightly Nikky Mei APRN-CNP   25 mg at 01/05/24 2028    thiamine (Vitamin B-1) tablet 100 mg  100 mg oral Daily DIMITRI Sims-CNP   100 mg at 01/06/24 0849    venlafaxine XR (Effexor-XR) 24 hr capsule 187.5 mg  187.5 mg oral Daily Brown Babb MD   187.5 mg at 01/06/24 0849         PHYSICAL EXAM:   /87   Pulse 77   Temp 36.3 °C (97.3 °F)   Resp 16   Ht 1.88 m (6' 2\")   Wt 132 kg (291 lb 7.2 oz)   SpO2 98%   BMI 37.42 kg/m²      CONSTITUTIONAL - well nourished, well developed, looks like stated age, in no acute distress, not " "ill-appearing   SKIN - normal skin color and pigmentation, normal skin turgor without rash   HEAD - no trauma, normocephalic  EYES - pupils are equal and reactive to light, extraocular muscles are intact, and normal external exam  ENT - TM's intact, no injection, no signs of infection, uvula midline, normal tongue movement and throat normal  NECK - supple without rigidity, no neck mass was observed, no thyromegaly    CHEST - clear to auscultation, no wheezing, no crackles and no rales, good effort  CARDIAC - regular rate and regular rhythm, no skipped beats, no murmur  ABDOMEN - no organomegaly, soft, nontender, nondistended, normal bowel sounds   NEUROLOGICAL - normal gait, normal balance, normal motor, no ataxia, DTRs equal and symmetrical; alert, oriented and no focal signs  PSYCHIATRIC - alert, pleasant and cordial, age-appropriate  IMMUNOLOGIC - no cervical lymphadenopathy  Generalized muscular weakness    Labs:  Lab Results   Component Value Date    WBC 6.0 01/06/2024    HGB 15.6 01/06/2024    HCT 47.6 01/06/2024     (H) 01/06/2024     01/06/2024     Lab Results   Component Value Date    GLUCOSE 101 (H) 01/06/2024    CALCIUM 9.3 01/06/2024     (L) 01/06/2024    K 4.6 01/06/2024    CO2 22 01/06/2024     01/06/2024    BUN 47 (H) 01/06/2024    CREATININE 2.22 (H) 01/06/2024   ESR: --No results found for: \"SEDRATE\"No results found for: \"CRP\"  Lab Results   Component Value Date    ALT 79 (H) 01/06/2024    AST 42 (H) 01/06/2024    ALKPHOS 68 01/06/2024    BILITOT 0.4 01/06/2024                      Problem List Items Addressed This Visit             ICD-10-CM    * (Principal) Alcohol withdrawal syndrome, uncomplicated (CMS/[facility]) - Primary F10.930     Other Visit Diagnoses         Codes    Chest pain, unspecified type     R07.9    Other specified diabetes mellitus with other specified complication, without long-term current use of insulin (CMS/[facility])     E13.69    Transaminitis     R74.01    " Alcohol abuse     F10.10    Hyponatremia     E87.1    HÉCTOR (acute kidney injury) (CMS/HCC)     N17.9    Dehydration     E86.0    Hypotension due to hypovolemia     I95.89, E86.1             1 L IV fluids  Monitor kidney function  Minimize NSAID use  Kidney function slowly improving            David Chadwick MD  This note was created using EzLike. Any inadvertent grammar, spelling or syntax errors are do to voice recognition software error and are not intentional.

## 2024-01-07 VITALS
TEMPERATURE: 97 F | HEART RATE: 82 BPM | DIASTOLIC BLOOD PRESSURE: 66 MMHG | SYSTOLIC BLOOD PRESSURE: 165 MMHG | HEIGHT: 74 IN | OXYGEN SATURATION: 99 % | RESPIRATION RATE: 18 BRPM | WEIGHT: 291.45 LBS | BODY MASS INDEX: 37.4 KG/M2

## 2024-01-07 PROBLEM — R73.9 HYPERGLYCEMIA: Status: RESOLVED | Noted: 2023-12-29 | Resolved: 2024-01-07

## 2024-01-07 LAB
ALBUMIN SERPL BCP-MCNC: 3.9 G/DL (ref 3.4–5)
ALP SERPL-CCNC: 65 U/L (ref 33–120)
ALT SERPL W P-5'-P-CCNC: 74 U/L (ref 10–52)
ANION GAP SERPL CALC-SCNC: 11 MMOL/L (ref 10–20)
AST SERPL W P-5'-P-CCNC: 40 U/L (ref 9–39)
BILIRUB SERPL-MCNC: 0.4 MG/DL (ref 0–1.2)
BUN SERPL-MCNC: 32 MG/DL (ref 6–23)
CALCIUM SERPL-MCNC: 9.6 MG/DL (ref 8.6–10.3)
CHLORIDE SERPL-SCNC: 99 MMOL/L (ref 98–107)
CO2 SERPL-SCNC: 30 MMOL/L (ref 21–32)
CREAT SERPL-MCNC: 1.53 MG/DL (ref 0.5–1.3)
ERYTHROCYTE [DISTWIDTH] IN BLOOD BY AUTOMATED COUNT: 12.3 % (ref 11.5–14.5)
GFR SERPL CREATININE-BSD FRML MDRD: 57 ML/MIN/1.73M*2
GLUCOSE SERPL-MCNC: 104 MG/DL (ref 74–99)
HCT VFR BLD AUTO: 43.4 % (ref 41–52)
HGB BLD-MCNC: 14.9 G/DL (ref 13.5–17.5)
MCH RBC QN AUTO: 35 PG (ref 26–34)
MCHC RBC AUTO-ENTMCNC: 34.3 G/DL (ref 32–36)
MCV RBC AUTO: 102 FL (ref 80–100)
NRBC BLD-RTO: 0 /100 WBCS (ref 0–0)
PLATELET # BLD AUTO: 242 X10*3/UL (ref 150–450)
POTASSIUM SERPL-SCNC: 4.1 MMOL/L (ref 3.5–5.3)
PROT SERPL-MCNC: 7.4 G/DL (ref 6.4–8.2)
RBC # BLD AUTO: 4.26 X10*6/UL (ref 4.5–5.9)
SODIUM SERPL-SCNC: 136 MMOL/L (ref 136–145)
WBC # BLD AUTO: 5.9 X10*3/UL (ref 4.4–11.3)

## 2024-01-07 PROCEDURE — 2500000001 HC RX 250 WO HCPCS SELF ADMINISTERED DRUGS (ALT 637 FOR MEDICARE OP): Performed by: NURSE PRACTITIONER

## 2024-01-07 PROCEDURE — 2500000004 HC RX 250 GENERAL PHARMACY W/ HCPCS (ALT 636 FOR OP/ED): Performed by: NURSE PRACTITIONER

## 2024-01-07 PROCEDURE — 85027 COMPLETE CBC AUTOMATED: CPT | Performed by: NURSE PRACTITIONER

## 2024-01-07 PROCEDURE — 2500000002 HC RX 250 W HCPCS SELF ADMINISTERED DRUGS (ALT 637 FOR MEDICARE OP, ALT 636 FOR OP/ED): Performed by: NURSE PRACTITIONER

## 2024-01-07 PROCEDURE — S4991 NICOTINE PATCH NONLEGEND: HCPCS | Performed by: NURSE PRACTITIONER

## 2024-01-07 PROCEDURE — 80053 COMPREHEN METABOLIC PANEL: CPT | Performed by: NURSE PRACTITIONER

## 2024-01-07 PROCEDURE — 2500000004 HC RX 250 GENERAL PHARMACY W/ HCPCS (ALT 636 FOR OP/ED): Performed by: PSYCHIATRY & NEUROLOGY

## 2024-01-07 PROCEDURE — 36415 COLL VENOUS BLD VENIPUNCTURE: CPT | Performed by: NURSE PRACTITIONER

## 2024-01-07 RX ORDER — GABAPENTIN 600 MG/1
600 TABLET ORAL 3 TIMES DAILY
Qty: 42 TABLET | Refills: 0 | Status: SHIPPED | OUTPATIENT
Start: 2024-01-07 | End: 2024-02-07 | Stop reason: HOSPADM

## 2024-01-07 RX ORDER — GABAPENTIN 300 MG/1
600 CAPSULE ORAL 3 TIMES DAILY
Status: DISCONTINUED | OUTPATIENT
Start: 2024-01-07 | End: 2024-01-07 | Stop reason: HOSPADM

## 2024-01-07 RX ORDER — LANOLIN ALCOHOL/MO/W.PET/CERES
100 CREAM (GRAM) TOPICAL DAILY
Qty: 30 TABLET | Refills: 0 | Status: SHIPPED | OUTPATIENT
Start: 2024-01-08 | End: 2024-02-07

## 2024-01-07 RX ORDER — AMLODIPINE BESYLATE 10 MG/1
10 TABLET ORAL DAILY
Qty: 14 TABLET | Refills: 0 | Status: SHIPPED | OUTPATIENT
Start: 2024-01-07 | End: 2024-02-07 | Stop reason: HOSPADM

## 2024-01-07 RX ORDER — OMEPRAZOLE 20 MG/1
20 CAPSULE, DELAYED RELEASE ORAL
Qty: 14 CAPSULE | Refills: 0 | Status: SHIPPED | OUTPATIENT
Start: 2024-01-07 | End: 2024-01-21

## 2024-01-07 RX ORDER — MULTIVIT-MIN/IRON FUM/FOLIC AC 7.5 MG-4
1 TABLET ORAL DAILY
Qty: 30 TABLET | Refills: 0 | Status: SHIPPED | OUTPATIENT
Start: 2024-01-08 | End: 2024-02-07

## 2024-01-07 RX ORDER — FOLIC ACID 1 MG/1
1 TABLET ORAL DAILY
Qty: 30 TABLET | Refills: 0 | Status: SHIPPED | OUTPATIENT
Start: 2024-01-08 | End: 2024-02-07

## 2024-01-07 RX ORDER — QUETIAPINE FUMARATE 25 MG/1
25 TABLET, FILM COATED ORAL NIGHTLY
Qty: 30 TABLET | Refills: 0 | Status: SHIPPED | OUTPATIENT
Start: 2024-01-07 | End: 2024-02-07 | Stop reason: HOSPADM

## 2024-01-07 RX ORDER — HYDROXYZINE HYDROCHLORIDE 25 MG/1
25 TABLET, FILM COATED ORAL 3 TIMES DAILY
Qty: 42 TABLET | Refills: 0 | Status: SHIPPED | OUTPATIENT
Start: 2024-01-07 | End: 2024-02-07 | Stop reason: HOSPADM

## 2024-01-07 RX ORDER — IBUPROFEN 200 MG
1 TABLET ORAL DAILY
Qty: 28 PATCH | Refills: 0 | Status: SHIPPED | OUTPATIENT
Start: 2024-01-08 | End: 2024-02-05

## 2024-01-07 RX ORDER — VENLAFAXINE HYDROCHLORIDE 150 MG/1
150 CAPSULE, EXTENDED RELEASE ORAL DAILY
Qty: 14 CAPSULE | Refills: 0 | Status: SHIPPED | OUTPATIENT
Start: 2024-01-07 | End: 2024-02-07 | Stop reason: HOSPADM

## 2024-01-07 RX ADMIN — AMLODIPINE BESYLATE 10 MG: 10 TABLET ORAL at 08:57

## 2024-01-07 RX ADMIN — FOLIC ACID 1 MG: 1 TABLET ORAL at 08:57

## 2024-01-07 RX ADMIN — THIAMINE HCL TAB 100 MG 100 MG: 100 TAB at 08:57

## 2024-01-07 RX ADMIN — Medication 1 TABLET: at 08:57

## 2024-01-07 RX ADMIN — NICOTINE 1 PATCH: 14 PATCH, EXTENDED RELEASE TRANSDERMAL at 08:58

## 2024-01-07 RX ADMIN — VENLAFAXINE HYDROCHLORIDE 187.5 MG: 75 CAPSULE, EXTENDED RELEASE ORAL at 08:57

## 2024-01-07 RX ADMIN — GABAPENTIN 600 MG: 300 CAPSULE ORAL at 14:38

## 2024-01-07 RX ADMIN — BUPRENORPHINE AND NALOXONE 2 TABLET: 8; 2 TABLET SUBLINGUAL at 08:57

## 2024-01-07 RX ADMIN — DIAZEPAM 5 MG: 5 TABLET ORAL at 08:57

## 2024-01-07 RX ADMIN — PANTOPRAZOLE SODIUM 40 MG: 40 TABLET, DELAYED RELEASE ORAL at 06:23

## 2024-01-07 RX ADMIN — GABAPENTIN 300 MG: 300 CAPSULE ORAL at 08:57

## 2024-01-07 ASSESSMENT — PAIN SCALES - GENERAL: PAINLEVEL_OUTOF10: 0 - NO PAIN

## 2024-01-07 ASSESSMENT — COGNITIVE AND FUNCTIONAL STATUS - GENERAL: DAILY ACTIVITIY SCORE: 24

## 2024-01-07 NOTE — CARE PLAN
The patient's goals for the shift include      The clinical goals for the shift include See care plan taken 1/7/2024    Pt will be discharged home in good condition.  Medications and follow up care has been discussed.  Psychiatric treatment and substance abuse treatment have been discussed.  Pt states he has no questions about POC, medications changes, follow up labs or MD visits.

## 2024-01-07 NOTE — NURSING NOTE
EOS: Patient has rested well through the night. He has not complained of any pain or discomfort. His safety has been maintained. He is stable at the time of writing.

## 2024-01-08 NOTE — PROGRESS NOTES
INPATIENT NEPHROLOGY CONSULT PROGRESS NOTE      Patient Name: Deyvi Araujo MRN: [id number]  DATE of SERVICE: January 7, 2024  TIME of SERVICE:  02:25 PM  CONSULTING SERVICE: Nephrology    REASON for CONSULT: HÉCTOR    SUBJECTIVE:  Seen and evaluated at bedside.  Feeling much better today.  Anxious to be discharged, however he is requesting his home medication to be refilled    SUMMARY OF STAY:  Mr. Araujo is a 43 y.o. male who presents to [facility] December 29, 2023 for evaluation of nausea, vomiting, chest pain, anxiety.  Patient with medical history significant for alcohol abuse, posttraumatic stress disorder.   Course complicated with acute kidney injury serum creatinine started to uptrend from 0.8 mg deciliter on presentation up to 1.1 mg/dL on January 2 and up to 3.6 mg/dL January 4th.  With drop in GFR to 21 mill per minute per 1.73 m², hyponatremia sodium level 131.    ASSESSMENT:  Acute kidney injury likely hemodynamically mediated in the setting of hypotension with concomitant hydrochlorothiazide/losartan use.  -- Baseline serum creatinine 0.9 mg deciliter EGFR 95 mill per minute per 1.73 m².  -- Peak serum creatinine to 3.5 mg/dL with drop in GFR to 21 mill per minute per 1.73 m²  -- No signs of obstruction ultrasound without hydronephrosis  -- No contrast exposure  -- No nephrotoxic medication  --Improving     Hyponatremia likely hypovolemic  Improving     Hypotension likely secondary to increased insensible losses in the setting of alcohol withdrawal symptoms.     Volume: No signs of volume overload.  Patient had a few days of increased insensible losses due to alcohol withdrawal     Alcohol withdrawal symptoms treated with CIWA protocol     Polysubstance abuse with substance-induced mood disorder evaluated by psychiatry with recommendations to continue Effexor     Plan:  Acute kidney injury improving serum creatinine down to 1.5 mg  deciliter.  Upon discharge to continue amlodipine 10 mg p.o. daily.  To hold losartan and hydrochlorothiazide for the time being.  Education provided not to combine hydrochlorothiazide with alcohol.  Cleared for discharge from nephrology standpoint  Follow-up in 1 to 2 weeks to monitor proteinuria and blood pressure management    Medications:  No current facility-administered medications for this encounter.    Current Outpatient Medications:     amLODIPine (Norvasc) 10 mg tablet, Take 1 tablet (10 mg) by mouth once daily for 14 days., Disp: 14 tablet, Rfl: 0    buprenorphine-naloxone (Suboxone) 8-2 mg SL tablet, Place 2 tablets under the tongue once daily., Disp: , Rfl:     [START ON 1/8/2024] folic acid (Folvite) 1 mg tablet, Take 1 tablet (1 mg) by mouth once daily. Do not start before January 8, 2024., Disp: 30 tablet, Rfl: 0    gabapentin (Neurontin) 600 mg tablet, Take 1 tablet (600 mg) by mouth 3 times a day for 14 days., Disp: 42 tablet, Rfl: 0    hydrOXYzine HCL (Atarax) 25 mg tablet, Take 1 tablet (25 mg) by mouth 3 times a day for 14 days., Disp: 42 tablet, Rfl: 0    [START ON 1/8/2024] multivitamin with minerals tablet, Take 1 tablet by mouth once daily. Do not start before January 8, 2024., Disp: 30 tablet, Rfl: 0    [START ON 1/8/2024] nicotine (Nicoderm CQ) 14 mg/24 hr patch, Place 1 patch over 24 hours on the skin once daily for 28 doses. Do not start before January 8, 2024., Disp: 28 patch, Rfl: 0    omeprazole (PriLOSEC) 20 mg DR capsule, Take 1 capsule (20 mg) by mouth once daily in the morning. Take before meals for 14 days., Disp: 14 capsule, Rfl: 0    QUEtiapine (SEROquel) 25 mg tablet, Take 1 tablet (25 mg) by mouth once daily at bedtime., Disp: 30 tablet, Rfl: 0    [START ON 1/8/2024] thiamine (Vitamin B-1) 100 mg tablet, Take 1 tablet (100 mg) by mouth once daily. Do not start before January 8, 2024., Disp: 30 tablet, Rfl: 0    venlafaxine XR (Effexor-XR) 150 mg 24 hr capsule, Take 1 capsule  (150 mg) by mouth once daily for 14 days., Disp: 14 capsule, Rfl: 0    PERTINENT ROS:  GENERAL:  positive for fatigue, poor appetite.  No fever/chills  RESPIRATORY:  positive for shortness of breath.  Negative for cough, wheezing.  CARDIOVASCULAR:   Negative for chest pain or palpitation.  GI:  Negative for abdominal pain, diarrhea, heartburn, nausea, vomiting  : Intermittent dysuria    Physical Exam:  Vital signs in last 24 hours:  Temp:  [35.8 °C (96.4 °F)-36.1 °C (97 °F)] 36.1 °C (97 °F)  Heart Rate:  [55-82] 82  Resp:  [16-18] 18  BP: (105-165)/(66-82) 165/66    General: Awake, cooperative, not in acute distress  HEENT:  NCAT,  mucous membranes moist and pink  NECK:  No JVD, no carotid bruit, supple, no cervical mass or thyromegaly  LUNGS;  Diminished breath sounds,  no Rales  CV:  Distant, regular rate and rhythm, no murmurs  ABDOMEN:  abdomen soft, nontender, BS normal, no masses or organomegaly  EDEMA: No lower extremity edema/dependent edema  SKIN:  dry and normal turgor, no clubbing, cyanosis or petechia.  No rashes noted      Intake/Output last 3 shifts:  I/O last 3 completed shifts:  In: 1951.7 (14.8 mL/kg) [P.O.:360; I.V.:1591.7 (12 mL/kg)]  Out: 4550 (34.4 mL/kg) [Urine:4550 (1 mL/kg/hr)]  Weight: 132.2 kg     DATA:  Diagnotic tests reviewed for Todays visit:  Results from last 7 days   Lab Units 01/07/24  0701   WBC AUTO x10*3/uL 5.9   RBC AUTO x10*6/uL 4.26*   HEMOGLOBIN g/dL 14.9   HEMATOCRIT % 43.4       Results from last 7 days   Lab Units 01/07/24  0701 01/04/24  0548 01/03/24  0628   SODIUM mmol/L 136   < > 133*   POTASSIUM mmol/L 4.1   < > 3.8   CHLORIDE mmol/L 99   < > 97*   CO2 mmol/L 30   < > 26   BUN mg/dL 32*   < > 26*   CREATININE mg/dL 1.53*   < > 1.17   CALCIUM mg/dL 9.6   < > 9.6   MAGNESIUM mg/dL  --   --  2.59*   BILIRUBIN TOTAL mg/dL 0.4   < > 0.7   ALT U/L 74*   < > 129*   AST U/L 40*   < > 75*    < > = values in this interval not displayed.       Results from last 7 days   Lab  Units 01/04/24 2031   COLOR U  Yellow   APPEARANCE U  Clear   PH U  5.0   SPEC GRAV UR  1.009   PROTEIN U mg/dL NEGATIVE   BLOOD UR  NEGATIVE   NITRITE U  NEGATIVE       IMAGING: CXR reviewed in  images      SIGNATURE: Mari Yanez MD  Nephrology and Hypertension  [address]  Office phone: [phone]  FAX: [phone]    This note was partially generated using the Dragon voice recognition system, and there may be some incorrect words, spelling's and punctuation that were not noted in checking the note before saving.

## 2024-01-12 NOTE — DISCHARGE SUMMARY
HOSPITAL COURSE AND DETAILS INCLUDING HPI/PHYSICAL EXAM /AND ADMISSION INFO    Deyvi Araujo is a 43 y.o. male presenting with 43-year-old patient has a history of PTSD and alcohol abuse as well as polysubstance abuse.  Patient comes in with anxiety chest pain nausea and vomiting.  He states that he is now having issues with alcohol.  He drinks 10-10 small bottles of fireball's a day.  He is tried trazodone for insomnia.  Otherwise he has been having some numbness and tingling in all of his extremities..    Eyes:      Extraocular Movements: Extraocular movements intact.      Pupils: Pupils are equal, round, and reactive to light.   Cardiovascular:      Rate and Rhythm: Normal rate and regular rhythm.      Pulses: Normal pulses.      Heart sounds: Normal heart sounds.   Pulmonary:      Effort: Pulmonary effort is normal.      Breath sounds: Normal breath sounds.   Abdominal:      General: Abdomen is flat. Bowel sounds are normal.      Palpations: Abdomen is soft.   Musculoskeletal:         General: Normal range of motion.   Skin:     General: Skin is warm and dry.      Capillary Refill: Capillary refill takes less than 2 seconds.   Neurological:      General: No focal deficit present.      Mental Status: He is alert and oriented to person, place, and time.   Psychiatric:         Behavior: Behavior normal.         Thought Content: Thought content normal     Alcohol withdrawal syndrome, uncomplicated (CMS/HCC)  Active Problems:    Anxiety and depression    Elevated LFTs    Hyperglycemia    Alcohol-induced insomnia (CMS/HCC)    Neuropathy        CIWA protocol  Electrolyte repletion  IV fluids  Monitor LFTs  DVT prophylaxis  Continue current care    This patient was admitted to the hospital with alcohol withdrawal syndrome, hyperglycemia and HÉCTOR.  Patient was brought to the hospital he started on CIWA protocol.  Patient did have a bump in his creatinine.  He was given fluid resuscitation holding his diuretics and we  corrected his electrolytes after weaning down his CIWA protocol and signout from consults patient was discharged for follow-up      DISCHARGE DIAGNOSIS          Problem List Items Addressed This Visit             ICD-10-CM    * (Principal) Alcohol withdrawal syndrome, uncomplicated (CMS/[facility]) - Primary F10.930    Anxiety and depression F41.9, F32.A    Relevant Medications    hydrOXYzine HCL (Atarax) 25 mg tablet    venlafaxine XR (Effexor-XR) 150 mg 24 hr capsule    Neuropathy G62.9    Relevant Medications    gabapentin (Neurontin) 600 mg tablet     Other Visit Diagnoses         Codes    Chest pain, unspecified type     R07.9    Other specified diabetes mellitus with other specified complication, without long-term current use of insulin (CMS/HCC)     E13.69    Transaminitis     R74.01    Alcohol abuse     F10.10    Relevant Medications    thiamine (Vitamin B-1) 100 mg tablet    QUEtiapine (SEROquel) 25 mg tablet    folic acid (Folvite) 1 mg tablet    multivitamin with minerals tablet    Hyponatremia     E87.1    HÉCTOR (acute kidney injury) (CMS/[facility])     N17.9    Relevant Orders    Basic metabolic panel    Dehydration     E86.0    Hypotension due to hypovolemia     I95.89, E86.1    Cigarette nicotine dependence with nicotine-induced disorder     F17.219    Relevant Medications    nicotine (Nicoderm CQ) 14 mg/24 hr patch    Primary hypertension     I10    Relevant Medications    amLODIPine (Norvasc) 10 mg tablet    Chronic GERD     K21.9    Relevant Medications    omeprazole (PriLOSEC) 20 mg DR capsule                                      Last Recorded Vitals:  Vitals:    01/07/24 0400 01/07/24 0800 01/07/24 1200 01/07/24 1600   BP: 146/80 105/78 111/82 165/66   BP Location: Right arm      Patient Position: Lying      Pulse: 73 72 76 82   Resp: 16 18 18 18   Temp: 35.9 °C (96.6 °F) 35.8 °C (96.4 °F) 35.9 °C (96.6 °F) 36.1 °C (97 °F)   TempSrc: Temporal      SpO2: 98% 99% 96% 99%   Weight:       Height:                  DISCHARGE MEDICATIONS       Your medication list        START taking these medications        Instructions Last Dose Given Next Dose Due   folic acid 1 mg tablet  Commonly known as: Folvite  Start taking on: January 8, 2024      Take 1 tablet (1 mg) by mouth once daily. Do not start before January 8, 2024.       multivitamin with minerals tablet  Start taking on: January 8, 2024      Take 1 tablet by mouth once daily. Do not start before January 8, 2024.       nicotine 14 mg/24 hr patch  Commonly known as: Nicoderm CQ  Start taking on: January 8, 2024      Place 1 patch over 24 hours on the skin once daily for 28 doses. Do not start before January 8, 2024.       QUEtiapine 25 mg tablet  Commonly known as: SEROquel      Take 1 tablet (25 mg) by mouth once daily at bedtime.       thiamine 100 mg tablet  Commonly known as: Vitamin B-1  Start taking on: January 8, 2024      Take 1 tablet (100 mg) by mouth once daily. Do not start before January 8, 2024.              CONTINUE taking these medications        Instructions Last Dose Given Next Dose Due   amLODIPine 10 mg tablet  Commonly known as: Norvasc      Take 1 tablet (10 mg) by mouth once daily for 14 days.       buprenorphine-naloxone 8-2 mg SL tablet  Commonly known as: Suboxone           gabapentin 600 mg tablet  Commonly known as: Neurontin      Take 1 tablet (600 mg) by mouth 3 times a day for 14 days.       hydrOXYzine HCL 25 mg tablet  Commonly known as: Atarax      Take 1 tablet (25 mg) by mouth 3 times a day for 14 days.       omeprazole 20 mg DR capsule  Commonly known as: PriLOSEC      Take 1 capsule (20 mg) by mouth once daily in the morning. Take before meals for 14 days.       venlafaxine  mg 24 hr capsule  Commonly known as: Effexor-XR      Take 1 capsule (150 mg) by mouth once daily for 14 days.              STOP taking these medications      losartan-hydrochlorothiazide 100-12.5 mg tablet  Commonly known as: Hyzaar                   Where to Get Your Medications        These medications were sent to [facility]/pharmacy #[id number] - [place], [place] - [address] [facility]  [address], [place] [id number]      Phone: [phone]   amLODIPine 10 mg tablet  folic acid 1 mg tablet  gabapentin 600 mg tablet  hydrOXYzine HCL 25 mg tablet  multivitamin with minerals tablet  nicotine 14 mg/24 hr patch  omeprazole 20 mg DR capsule  QUEtiapine 25 mg tablet  thiamine 100 mg tablet  venlafaxine  mg 24 hr capsule           OUTPATIENT FOLLOW-UP    No future appointments.    Patient has been given the appropriate discharge instructions discharge medications and follow-up with appropriate physicians.  For all of the pertinent data  including labs- consults-imaging please see the chart.

## 2024-01-29 ENCOUNTER — APPOINTMENT (OUTPATIENT)
Dept: RADIOLOGY | Facility: HOSPITAL | Age: 44
End: 2024-01-29
Payer: COMMERCIAL

## 2024-01-29 ENCOUNTER — APPOINTMENT (OUTPATIENT)
Dept: CARDIOLOGY | Facility: HOSPITAL | Age: 44
End: 2024-01-29
Payer: COMMERCIAL

## 2024-01-29 ENCOUNTER — HOSPITAL ENCOUNTER (EMERGENCY)
Facility: HOSPITAL | Age: 44
Discharge: HOME | End: 2024-01-29
Attending: EMERGENCY MEDICINE
Payer: COMMERCIAL

## 2024-01-29 VITALS
WEIGHT: 280 LBS | SYSTOLIC BLOOD PRESSURE: 142 MMHG | HEIGHT: 74 IN | DIASTOLIC BLOOD PRESSURE: 77 MMHG | OXYGEN SATURATION: 100 % | HEART RATE: 84 BPM | TEMPERATURE: 97.5 F | BODY MASS INDEX: 35.94 KG/M2 | RESPIRATION RATE: 18 BRPM

## 2024-01-29 DIAGNOSIS — R07.9 CHEST PAIN, UNSPECIFIED TYPE: Primary | ICD-10-CM

## 2024-01-29 DIAGNOSIS — F41.0 PANIC ATTACK: ICD-10-CM

## 2024-01-29 LAB
ALBUMIN SERPL BCP-MCNC: 4 G/DL (ref 3.4–5)
ALP SERPL-CCNC: 77 U/L (ref 33–120)
ALT SERPL W P-5'-P-CCNC: 127 U/L (ref 10–52)
AMPHETAMINES UR QL SCN: NORMAL
ANION GAP SERPL CALC-SCNC: 16 MMOL/L (ref 10–20)
APAP SERPL-MCNC: <10 UG/ML
APPEARANCE UR: CLEAR
AST SERPL W P-5'-P-CCNC: 71 U/L (ref 9–39)
BARBITURATES UR QL SCN: NORMAL
BASOPHILS # BLD AUTO: 0.07 X10*3/UL (ref 0–0.1)
BASOPHILS NFR BLD AUTO: 1 %
BENZODIAZ UR QL SCN: NORMAL
BILIRUB SERPL-MCNC: 0.5 MG/DL (ref 0–1.2)
BILIRUB UR STRIP.AUTO-MCNC: NEGATIVE MG/DL
BNP SERPL-MCNC: 6 PG/ML (ref 0–99)
BUN SERPL-MCNC: 8 MG/DL (ref 6–23)
BZE UR QL SCN: NORMAL
CALCIUM SERPL-MCNC: 9.9 MG/DL (ref 8.6–10.3)
CANNABINOIDS UR QL SCN: NORMAL
CARDIAC TROPONIN I PNL SERPL HS: 3 NG/L (ref 0–20)
CARDIAC TROPONIN I PNL SERPL HS: 3 NG/L (ref 0–20)
CHLORIDE SERPL-SCNC: 100 MMOL/L (ref 98–107)
CO2 SERPL-SCNC: 22 MMOL/L (ref 21–32)
COLOR UR: ABNORMAL
CREAT SERPL-MCNC: 0.92 MG/DL (ref 0.5–1.3)
D DIMER PPP FEU-MCNC: 624 NG/ML FEU
EGFRCR SERPLBLD CKD-EPI 2021: >90 ML/MIN/1.73M*2
EOSINOPHIL # BLD AUTO: 0.07 X10*3/UL (ref 0–0.7)
EOSINOPHIL NFR BLD AUTO: 1 %
ERYTHROCYTE [DISTWIDTH] IN BLOOD BY AUTOMATED COUNT: 12.6 % (ref 11.5–14.5)
ETHANOL SERPL-MCNC: 51 MG/DL
FENTANYL+NORFENTANYL UR QL SCN: NORMAL
GLUCOSE SERPL-MCNC: 230 MG/DL (ref 74–99)
GLUCOSE UR STRIP.AUTO-MCNC: ABNORMAL MG/DL
HCT VFR BLD AUTO: 43.4 % (ref 41–52)
HGB BLD-MCNC: 15.5 G/DL (ref 13.5–17.5)
HOLD SPECIMEN: NORMAL
IMM GRANULOCYTES # BLD AUTO: 0.03 X10*3/UL (ref 0–0.7)
IMM GRANULOCYTES NFR BLD AUTO: 0.4 % (ref 0–0.9)
KETONES UR STRIP.AUTO-MCNC: NEGATIVE MG/DL
LEUKOCYTE ESTERASE UR QL STRIP.AUTO: NEGATIVE
LIPASE SERPL-CCNC: 91 U/L (ref 9–82)
LYMPHOCYTES # BLD AUTO: 1.89 X10*3/UL (ref 1.2–4.8)
LYMPHOCYTES NFR BLD AUTO: 26.5 %
MAGNESIUM SERPL-MCNC: 1.79 MG/DL (ref 1.6–2.4)
MCH RBC QN AUTO: 36 PG (ref 26–34)
MCHC RBC AUTO-ENTMCNC: 35.7 G/DL (ref 32–36)
MCV RBC AUTO: 101 FL (ref 80–100)
MONOCYTES # BLD AUTO: 0.54 X10*3/UL (ref 0.1–1)
MONOCYTES NFR BLD AUTO: 7.6 %
NEUTROPHILS # BLD AUTO: 4.52 X10*3/UL (ref 1.2–7.7)
NEUTROPHILS NFR BLD AUTO: 63.5 %
NITRITE UR QL STRIP.AUTO: NEGATIVE
NRBC BLD-RTO: 0 /100 WBCS (ref 0–0)
OPIATES UR QL SCN: NORMAL
OXYCODONE+OXYMORPHONE UR QL SCN: NORMAL
PCP UR QL SCN: NORMAL
PH UR STRIP.AUTO: 5 [PH]
PLATELET # BLD AUTO: 200 X10*3/UL (ref 150–450)
POTASSIUM SERPL-SCNC: 3.6 MMOL/L (ref 3.5–5.3)
PROT SERPL-MCNC: 7.6 G/DL (ref 6.4–8.2)
PROT UR STRIP.AUTO-MCNC: NEGATIVE MG/DL
RBC # BLD AUTO: 4.3 X10*6/UL (ref 4.5–5.9)
RBC # UR STRIP.AUTO: NEGATIVE /UL
SALICYLATES SERPL-MCNC: <3 MG/DL
SODIUM SERPL-SCNC: 134 MMOL/L (ref 136–145)
SP GR UR STRIP.AUTO: 1.01
UROBILINOGEN UR STRIP.AUTO-MCNC: <2 MG/DL
WBC # BLD AUTO: 7.1 X10*3/UL (ref 4.4–11.3)

## 2024-01-29 PROCEDURE — 80053 COMPREHEN METABOLIC PANEL: CPT | Performed by: EMERGENCY MEDICINE

## 2024-01-29 PROCEDURE — 96374 THER/PROPH/DIAG INJ IV PUSH: CPT

## 2024-01-29 PROCEDURE — 2550000001 HC RX 255 CONTRASTS: Performed by: EMERGENCY MEDICINE

## 2024-01-29 PROCEDURE — 99284 EMERGENCY DEPT VISIT MOD MDM: CPT | Performed by: EMERGENCY MEDICINE

## 2024-01-29 PROCEDURE — 96361 HYDRATE IV INFUSION ADD-ON: CPT

## 2024-01-29 PROCEDURE — 83690 ASSAY OF LIPASE: CPT | Performed by: EMERGENCY MEDICINE

## 2024-01-29 PROCEDURE — 2500000004 HC RX 250 GENERAL PHARMACY W/ HCPCS (ALT 636 FOR OP/ED): Performed by: EMERGENCY MEDICINE

## 2024-01-29 PROCEDURE — 71275 CT ANGIOGRAPHY CHEST: CPT

## 2024-01-29 PROCEDURE — 85025 COMPLETE CBC W/AUTO DIFF WBC: CPT | Performed by: EMERGENCY MEDICINE

## 2024-01-29 PROCEDURE — 93005 ELECTROCARDIOGRAM TRACING: CPT | Mod: 59

## 2024-01-29 PROCEDURE — 80143 DRUG ASSAY ACETAMINOPHEN: CPT | Performed by: EMERGENCY MEDICINE

## 2024-01-29 PROCEDURE — 93005 ELECTROCARDIOGRAM TRACING: CPT

## 2024-01-29 PROCEDURE — 83735 ASSAY OF MAGNESIUM: CPT | Performed by: EMERGENCY MEDICINE

## 2024-01-29 PROCEDURE — 84484 ASSAY OF TROPONIN QUANT: CPT | Performed by: EMERGENCY MEDICINE

## 2024-01-29 PROCEDURE — 36415 COLL VENOUS BLD VENIPUNCTURE: CPT | Performed by: EMERGENCY MEDICINE

## 2024-01-29 PROCEDURE — 81003 URINALYSIS AUTO W/O SCOPE: CPT | Mod: 59 | Performed by: EMERGENCY MEDICINE

## 2024-01-29 PROCEDURE — 80307 DRUG TEST PRSMV CHEM ANLYZR: CPT | Performed by: EMERGENCY MEDICINE

## 2024-01-29 PROCEDURE — 85379 FIBRIN DEGRADATION QUANT: CPT | Performed by: EMERGENCY MEDICINE

## 2024-01-29 PROCEDURE — 71045 X-RAY EXAM CHEST 1 VIEW: CPT | Performed by: RADIOLOGY

## 2024-01-29 PROCEDURE — 99285 EMERGENCY DEPT VISIT HI MDM: CPT

## 2024-01-29 PROCEDURE — 83880 ASSAY OF NATRIURETIC PEPTIDE: CPT | Performed by: EMERGENCY MEDICINE

## 2024-01-29 PROCEDURE — 71045 X-RAY EXAM CHEST 1 VIEW: CPT

## 2024-01-29 PROCEDURE — 71275 CT ANGIOGRAPHY CHEST: CPT | Performed by: RADIOLOGY

## 2024-01-29 RX ORDER — LORAZEPAM 2 MG/ML
2 INJECTION INTRAMUSCULAR ONCE
Status: COMPLETED | OUTPATIENT
Start: 2024-01-29 | End: 2024-01-29

## 2024-01-29 RX ADMIN — IOHEXOL 75 ML: 350 INJECTION, SOLUTION INTRAVENOUS at 10:53

## 2024-01-29 RX ADMIN — SODIUM CHLORIDE 1000 ML: 9 INJECTION, SOLUTION INTRAVENOUS at 08:51

## 2024-01-29 RX ADMIN — LORAZEPAM 2 MG: 2 INJECTION, SOLUTION INTRAMUSCULAR; INTRAVENOUS at 08:51

## 2024-01-29 ASSESSMENT — PAIN SCALES - GENERAL: PAINLEVEL_OUTOF10: 5 - MODERATE PAIN

## 2024-01-29 ASSESSMENT — HEART SCORE
AGE: <45
ECG: NORMAL
TROPONIN: LESS THAN OR EQUAL TO NORMAL LIMIT
HEART SCORE: 0
RISK FACTORS: NO KNOWN RISK FACTORS
HISTORY: SLIGHTLY SUSPICIOUS

## 2024-01-29 ASSESSMENT — COLUMBIA-SUICIDE SEVERITY RATING SCALE - C-SSRS
6. HAVE YOU EVER DONE ANYTHING, STARTED TO DO ANYTHING, OR PREPARED TO DO ANYTHING TO END YOUR LIFE?: NO
2. HAVE YOU ACTUALLY HAD ANY THOUGHTS OF KILLING YOURSELF?: NO
1. IN THE PAST MONTH, HAVE YOU WISHED YOU WERE DEAD OR WISHED YOU COULD GO TO SLEEP AND NOT WAKE UP?: NO

## 2024-01-29 ASSESSMENT — LIFESTYLE VARIABLES
HAVE PEOPLE ANNOYED YOU BY CRITICIZING YOUR DRINKING: NO
HAVE PEOPLE ANNOYED YOU BY CRITICIZING YOUR DRINKING: NO
EVER FELT BAD OR GUILTY ABOUT YOUR DRINKING: NO
REASON UNABLE TO ASSESS: NO
EVER HAD A DRINK FIRST THING IN THE MORNING TO STEADY YOUR NERVES TO GET RID OF A HANGOVER: NO
REASON UNABLE TO ASSESS: NO
EVER HAD A DRINK FIRST THING IN THE MORNING TO STEADY YOUR NERVES TO GET RID OF A HANGOVER: NO
HAVE YOU EVER FELT YOU SHOULD CUT DOWN ON YOUR DRINKING: NO
HAVE YOU EVER FELT YOU SHOULD CUT DOWN ON YOUR DRINKING: NO

## 2024-01-29 ASSESSMENT — PAIN DESCRIPTION - LOCATION: LOCATION: CHEST

## 2024-01-29 ASSESSMENT — PAIN DESCRIPTION - PAIN TYPE: TYPE: ACUTE PAIN

## 2024-01-29 ASSESSMENT — PAIN - FUNCTIONAL ASSESSMENT: PAIN_FUNCTIONAL_ASSESSMENT: 0-10

## 2024-01-29 NOTE — ED PROVIDER NOTES
HPI   Chief Complaint   Patient presents with    Chest Pain         History provided by:  Patient and EMS personnel  History of Present Illness:  43-year-old male presents by EMS with panic attack and chest pain.  The patient shares that he dropped his son off at school this morning and then started to feel very anxious.  He thought it was one of his typical panic attacks but he could not get it under control this time.  He tried to do his breathing exercises and even stopped at the gas station and drank 2 many liquor bottles.  He then started with the sharp stabbing chest pain so he came to the emergency department for evaluation.  No fever, chills or cough.  No back or flank pain.  No abdominal pain.  No nausea or vomiting.  No hematemesis, hematochezia or melena.  No leg swelling.  Nothing seems to make his symptoms worse or better.  No new inciting events.  No trauma or travel.      PMFSH:   As per HPI, otherwise nurses notes reviewed in EMR.    Past Medical History:   Active Ambulatory Problems     Diagnosis Date Noted    Alcohol withdrawal syndrome, uncomplicated (CMS/HCC) 12/29/2023    Anxiety and depression 12/29/2023    Elevated LFTs 12/29/2023    Alcohol-induced insomnia (CMS/HCC) 12/29/2023    Neuropathy 12/29/2023     Resolved Ambulatory Problems     Diagnosis Date Noted    Hyperglycemia 12/29/2023     Past Medical History:   Diagnosis Date    Alcohol abuse     GERD (gastroesophageal reflux disease)     HLD (hyperlipidemia)     HTN (hypertension)     Obesity (BMI 30.0-34.9)     Polysubstance abuse (CMS/HCC)     PTSD (post-traumatic stress disorder)       Past Surgical History: History reviewed. No pertinent surgical history.   Family History:   Family History   Problem Relation Name Age of Onset    Alcohol abuse Father        Social History:    Social History     Socioeconomic History    Marital status: Single     Spouse name: Not on file    Number of children: Not on file    Years of education: Not on  file    Highest education level: Not on file   Occupational History    Not on file   Tobacco Use    Smoking status: Every Day     Packs/day: .5     Types: Cigarettes    Smokeless tobacco: Never   Substance and Sexual Activity    Alcohol use: Not on file    Drug use: Not Currently    Sexual activity: Not on file   Other Topics Concern    Not on file   Social History Narrative    Not on file     Social Determinants of Health     Financial Resource Strain: Low Risk  (1/2/2024)    Overall Financial Resource Strain (CARDIA)     Difficulty of Paying Living Expenses: Not hard at all   Food Insecurity: Not on file   Transportation Needs: No Transportation Needs (1/2/2024)    PRAPARE - Transportation     Lack of Transportation (Medical): No     Lack of Transportation (Non-Medical): No   Physical Activity: Not on file   Stress: Not on file   Social Connections: Not on file   Intimate Partner Violence: Not on file   Housing Stability: Low Risk  (1/2/2024)    Housing Stability Vital Sign     Unable to Pay for Housing in the Last Year: No     Number of Places Lived in the Last Year: 1     Unstable Housing in the Last Year: No                          [place] Coma Scale Score: 15   HEART Score: 0                Patient History   Past Medical History:   Diagnosis Date    Alcohol abuse     Anxiety and depression     GERD (gastroesophageal reflux disease)     HLD (hyperlipidemia)     HTN (hypertension)     Obesity (BMI 30.0-34.9)     Polysubstance abuse (CMS/HCC)     PTSD (post-traumatic stress disorder)      History reviewed. No pertinent surgical history.  Family History   Problem Relation Name Age of Onset    Alcohol abuse Father       Social History     Tobacco Use    Smoking status: Every Day     Packs/day: .5     Types: Cigarettes    Smokeless tobacco: Never   Substance Use Topics    Alcohol use: Not on file    Drug use: Not Currently       Physical Exam   ED Triage Vitals [01/29/24 0840]   Temperature Heart Rate Respirations  BP   36.4 °C (97.5 °F) 90 20 (!) 120/91      Pulse Ox Temp src Heart Rate Source Patient Position   95 % -- Monitor Lying      BP Location FiO2 (%)     Left arm --       Physical Exam  Physical Exam:    ED Triage Vitals [01/29/24 0840]   Temperature Heart Rate Respirations BP   36.4 °C (97.5 °F) 90 20 (!) 120/91      Pulse Ox Temp src Heart Rate Source Patient Position   95 % -- Monitor Lying      BP Location FiO2 (%)     Left arm --         Constitutional: Vital signs per nursing notes.  Well developed, well nourished.  Mild acute distress.    Psychiatric: alert and oriented to person, place, and time; anxious; memory intact  Eyes: PERRL; conjunctivae and lids normal; EOMI  ENT: otoscopic exam of external canal and TM´s normal; nasal mucosa, turbinates, and septum normal; mouth, tongue, and pharynx normal; pharynx without edema, exudate, or injection  Respiratory: normal respiratory effort and excursion; no rales, rhonchi, or wheezes; equal air entry  Cardiovascular: regular rate and rhythm; no murmurs, rubs or gallops; symmetric pulses; no edema; normal capillary refill; distal pulses present  Neurological: normal speech; CN II-XII grossly intact; normal motor and sensory function; no nystagmus  GI: no masses, tenderness, rebound or guarding; no palpable, pulsatile mass; no organomegaly; no hernia; normal bowel sounds; (-) Mcnulty´s sign; (-) McBurney´s sign; (-) CVA tenderness  Lymphatic: no adenopathy of neck  Musculoskeletal: normal gait and station; normal digits and nails; no gross tendon or ligament injury; normal to palpation; normal strength/tone; neurovascular status intact; (-) Monique´s sign  Skin: normal to inspection; normal to palpation; no rash  GCS: 15    ED Course & MDM   Diagnoses as of 01/29/24 1118   Chest pain, unspecified type   Panic attack       Medical Decision Making  Medical Decision Making:    Differential Diagnoses Considered: ACS, arrhythmia, electrolyte disorder, panic attack     EKG:  My interpretation of EKG is normal sinus rhythm 88 bpm with no acute ST-T changes           My interpretation of second EKG is normal sinus rhythm at 79 bpm with no acute ST-T changes.  There is no significant change from the initial EKG.    Labs Reviewed   CBC WITH AUTO DIFFERENTIAL - Abnormal       Result Value    WBC 7.1      nRBC 0.0      RBC 4.30 (*)     Hemoglobin 15.5      Hematocrit 43.4       (*)     MCH 36.0 (*)     MCHC 35.7      RDW 12.6      Platelets 200      Neutrophils % 63.5      Immature Granulocytes %, Automated 0.4      Lymphocytes % 26.5      Monocytes % 7.6      Eosinophils % 1.0      Basophils % 1.0      Neutrophils Absolute 4.52      Immature Granulocytes Absolute, Automated 0.03      Lymphocytes Absolute 1.89      Monocytes Absolute 0.54      Eosinophils Absolute 0.07      Basophils Absolute 0.07     COMPREHENSIVE METABOLIC PANEL - Abnormal    Glucose 230 (*)     Sodium 134 (*)     Potassium 3.6      Chloride 100      Bicarbonate 22      Anion Gap 16      Urea Nitrogen 8      Creatinine 0.92      eGFR >90      Calcium 9.9      Albumin 4.0      Alkaline Phosphatase 77      Total Protein 7.6      AST 71 (*)     Bilirubin, Total 0.5       (*)    D-DIMER, VTE EXCLUSION - Abnormal    D-Dimer, Quantitative VTE Exclusion 624 (*)     Narrative:     The VTE Exclusion D-Dimer assay is reported in ng/mL Fibrinogen Equivalent Units (FEU).    Per 's instructions for use, a value of less than 500 ng/mL (FEU) may help to exclude DVT or PE in outpatients when the assay is used with a clinical pretest probability assessment.([facility] must utilize and document eCalc 'Wells Score Deep Vein Thrombosis Risk' for DVT exclusion only. Emergency Department should utilize  Guidelines for Emergency Department Use of the VTE Exclusion D-Dimer and Clinical Pretest probability assessment model for DVT or PE exclusion.)   LIPASE - Abnormal    Lipase 91 (*)     Narrative:     Venipuncture  immediately after or during the administration of Metamizole may lead to falsely low results. Testing should be performed immediately prior to Metamizole dosing.   ACUTE TOXICOLOGY PANEL, BLOOD - Abnormal    Acetaminophen <10.0      Salicylate  <3      Alcohol 51 (*)    URINALYSIS WITH REFLEX CULTURE AND MICROSCOPIC - Abnormal    Color, Urine Straw      Appearance, Urine Clear      Specific Gravity, Urine 1.009      pH, Urine 5.0      Protein, Urine NEGATIVE      Glucose, Urine >=500 (3+) (*)     Blood, Urine NEGATIVE      Ketones, Urine NEGATIVE      Bilirubin, Urine NEGATIVE      Urobilinogen, Urine <2.0      Nitrite, Urine NEGATIVE      Leukocyte Esterase, Urine NEGATIVE     MAGNESIUM - Normal    Magnesium 1.79     B-TYPE NATRIURETIC PEPTIDE - Normal    BNP 6      Narrative:        <100 pg/mL - Heart failure unlikely  100-299 pg/mL - Intermediate probability of acute heart                  failure exacerbation. Correlate with clinical                  context and patient history.    >=300 pg/mL - Heart Failure likely. Correlate with clinical                  context and patient history.    BNP testing is performed using different testing methodology at [facility] [place] than at other [facility]. Direct result comparisons should only be made within the same method.      DRUG SCREEN, URINE WITH REFLEX TO CONFIRMATION - Normal    Amphetamine Screen, Urine Presumptive Negative      Barbiturate Screen, Urine Presumptive Negative      Benzodiazepines Screen, Urine Presumptive Negative      Cannabinoid Screen, Urine Presumptive Negative      Cocaine Metabolite Screen, Urine Presumptive Negative      Fentanyl Screen, Urine Presumptive Negative      Opiate Screen, Urine Presumptive Negative      Oxycodone Screen, Urine Presumptive Negative      PCP Screen, Urine Presumptive Negative      Narrative:     Drug screen results are presumptive and should not be used to assess   compliance with prescribed  medication. Definitive confirmatory drug testing   has been added to this sample for any positive screen result and will be  reported separately.     Toxicology screening results are reported qualitatively. The concentration must  be greater than or equal to the cutoff to be reported as positive. The concentration   at which the screening test can detect an individual drug or metabolite varies.   The absence of expected drug(s) and/or drug metabolite(s) may indicate non-compliance,   inappropriate timing of specimen collection relative to drug administration, poor drug   absorption, diluted/adulterated urine, or limitations of testing. For medical purposes   only; not valid for forensic use.    Interpretive questions should be directed to the laboratory medical directors.   SERIAL TROPONIN-INITIAL - Normal    Troponin I, High Sensitivity 3      Narrative:     Less than 99th percentile of normal range cutoff-  Female and children under 18 years old <14 ng/L; Male <21 ng/L: Negative  Repeat testing should be performed if clinically indicated.     Female and children under 18 years old 14-50 ng/L; Male 21-50 ng/L:  Consistent with possible cardiac damage and possible increased clinical   risk. Serial measurements may help to assess extent of myocardial damage.     >50 ng/L: Consistent with cardiac damage, increased clinical risk and  myocardial infarction. Serial measurements may help assess extent of   myocardial damage.      NOTE: Children less than 1 year old may have higher baseline troponin   levels and results should be interpreted in conjunction with the overall   clinical context.     NOTE: Troponin I testing is performed using a different   testing methodology at [facility] than at other   [facility]. Direct result comparisons should only   be made within the same method.   SERIAL TROPONIN, 1 HOUR - Normal    Troponin I, High Sensitivity 3      Narrative:     Less than 99th percentile of  normal range cutoff-  Female and children under 18 years old <14 ng/L; Male <21 ng/L: Negative  Repeat testing should be performed if clinically indicated.     Female and children under 18 years old 14-50 ng/L; Male 21-50 ng/L:  Consistent with possible cardiac damage and possible increased clinical   risk. Serial measurements may help to assess extent of myocardial damage.     >50 ng/L: Consistent with cardiac damage, increased clinical risk and  myocardial infarction. Serial measurements may help assess extent of   myocardial damage.      NOTE: Children less than 1 year old may have higher baseline troponin   levels and results should be interpreted in conjunction with the overall   clinical context.     NOTE: Troponin I testing is performed using a different   testing methodology at [facility] than at other   [facility]. Direct result comparisons should only   be made within the same method.   TROPONIN SERIES- (INITIAL, 1 HR)    Narrative:     The following orders were created for panel order Troponin I Series, High Sensitivity (0, 1 HR).  Procedure                               Abnormality         Status                     ---------                               -----------         ------                     Troponin I, High Sensiti...[[id number]]  Normal              Final result               Troponin, High Sensitivi...[[id number]]  Normal              Final result                 Please view results for these tests on the individual orders.   URINALYSIS WITH REFLEX CULTURE AND MICROSCOPIC    Narrative:     The following orders were created for panel order Urinalysis with Reflex Culture and Microscopic.  Procedure                               Abnormality         Status                     ---------                               -----------         ------                     Urinalysis with Reflex C...[[id number]]  Abnormal            Final result               Extra Urine [place]  Tube[[id number]]                            In process                   Please view results for these tests on the individual orders.   EXTRA URINE GRAY TUBE       CT angio chest for pulmonary embolism   Final Result   No evidence of a pulmonary embolism.   Mild areas of atelectasis/scarring in the lungs without focal   consolidation Hepatic steatosis.        MACRO:   None.        Signed by: Gage Gonzalez 1/29/2024 11:02 AM   Dictation workstation:   [id number]      XR chest 1 view   Final Result   No radiographic evidence of an acute cardiopulmonary process.        MACRO:   None.        Signed by: Gage Gonzalez 1/29/2024 10:49 AM   Dictation workstation:   [id number]          Diagnostic testing considered:         Review of recent and relevant records:    ED Medication Administration:   ED Medication Administration from 01/29/2024 0836 to 01/29/2024 1118         Date/Time Order Dose Route Action Action by     01/29/2024 0851 EST LORazepam (Ativan) injection 2 mg 2 mg intravenous Given Woehrman, A     01/29/2024 0851 EST sodium chloride 0.9 % bolus 1,000 mL 1,000 mL intravenous New Bag Woehrman, A     01/29/2024 0951 EST sodium chloride 0.9 % bolus 1,000 mL 0 mL intravenous Stopped Woehrman, A     01/29/2024 1053 EST iohexol (OMNIPaque) 350 mg iodine/mL solution 75 mL 75 mL intravenous Given Midkiff, P            Prescription Medication Consideration/Given:     Social Determinants of Health Significantly Affecting Care:      Summary:    /66 (01/29/24 1029)    Temp      Pulse 77 (01/29/24 1029)   Resp 18 (01/29/24 1029)    SpO2 98 % (01/29/24 1029)      Abnormal Labs Reviewed   CBC WITH AUTO DIFFERENTIAL - Abnormal; Notable for the following components:       Result Value    RBC 4.30 (*)      (*)     MCH 36.0 (*)     All other components within normal limits   COMPREHENSIVE METABOLIC PANEL - Abnormal; Notable for the following components:    Glucose 230 (*)     Sodium 134 (*)     AST 71 (*)      (*)      All other components within normal limits   D-DIMER, VTE EXCLUSION - Abnormal; Notable for the following components:    D-Dimer, Quantitative VTE Exclusion 624 (*)     All other components within normal limits    Narrative:     The VTE Exclusion D-Dimer assay is reported in ng/mL Fibrinogen Equivalent Units (FEU).    Per 's instructions for use, a value of less than 500 ng/mL (FEU) may help to exclude DVT or PE in outpatients when the assay is used with a clinical pretest probability assessment.(AE must utilize and document eCalc 'Wells Score Deep Vein Thrombosis Risk' for DVT exclusion only. Emergency Department should utilize  Guidelines for Emergency Department Use of the VTE Exclusion D-Dimer and Clinical Pretest probability assessment model for DVT or PE exclusion.)   LIPASE - Abnormal; Notable for the following components:    Lipase 91 (*)     All other components within normal limits    Narrative:     Venipuncture immediately after or during the administration of Metamizole may lead to falsely low results. Testing should be performed immediately prior to Metamizole dosing.   ACUTE TOXICOLOGY PANEL, BLOOD - Abnormal; Notable for the following components:    Alcohol 51 (*)     All other components within normal limits   URINALYSIS WITH REFLEX CULTURE AND MICROSCOPIC - Abnormal; Notable for the following components:    Glucose, Urine >=500 (3+) (*)     All other components within normal limits       My interpretation of chest x-ray is no cardiomegaly, lungs clear and bones intact.    Diagnostic evaluation was completed.  2 sets of high-sensitivity troponin were unremarkable.  Therefore I do not suspect acute coronary syndrome.  BNP is in the normal range and I do not suspect congestive heart failure.  Lipase was slightly elevated at 91.  This could represent some mild pancreatitis as the patient does have a known history of alcohol abuse.  Acetaminophen and salicylate were negative.  Alcohol  was slightly elevated at 51.  This goes along with the patient's history that he did drink some alcohol just prior to arrival.  Metabolic panel showed an elevated glucose at 230.  Other electrolytes and renal function are in the normal range.  Slight elevation of the ALT and AST at 127 and 71.  Urine drug screen is negative.  D-dimer was slightly elevated at 624.  Urinalysis showed 3+ glucose.  CBC showed a normal white blood cell count so I do not suspect an overwhelming infectious process.  There is no evidence of anemia with a hemoglobin of 15.5 and I do not suspect acute blood loss.  Platelets are in the normal range.  CT of the chest shows no evidence of a pulmonary embolism.  Mild areas of atelectasis scarring in the lungs without focal consolidation.  Hepatic steatosis.  Chest x-ray shows no radiographic evidence of an acute cardiopulmonary process.    Heart score is 0.    The exact cause of the patient's symptoms are unclear at this time.  I suspect that the patient likely had a panic attack.  He was treated with a dose of Ativan here in the emergency department his condition improved.  Patient will be discharged home with short-term follow-up with his primary care provider.    I discussed the results and discharge plan with the patient and/or family/friend if present.  I emphasized the importance of follow up with the physician I referred them to in the timeframe recommended.  I explained reasons for the patient to return to the Emergency Department.  Questions were addressed.  The patient and/or family/friend expressed understanding.        Diagnoses as of 01/29/24 1118   Chest pain, unspecified type   Panic attack         Procedure  Procedures     Samy DIXON MD  01/29/24 1118

## 2024-01-30 LAB
ATRIAL RATE: 88 BPM
P AXIS: 58 DEGREES
P OFFSET: 204 MS
P ONSET: 147 MS
PR INTERVAL: 148 MS
Q ONSET: 221 MS
QRS COUNT: 14 BEATS
QRS DURATION: 84 MS
QT INTERVAL: 374 MS
QTC CALCULATION(BAZETT): 452 MS
QTC FREDERICIA: 425 MS
R AXIS: 40 DEGREES
T AXIS: 8 DEGREES
T OFFSET: 408 MS
VENTRICULAR RATE: 88 BPM

## 2024-01-31 LAB
ATRIAL RATE: 79 BPM
P AXIS: 45 DEGREES
P OFFSET: 202 MS
P ONSET: 147 MS
PR INTERVAL: 148 MS
Q ONSET: 221 MS
QRS COUNT: 13 BEATS
QRS DURATION: 96 MS
QT INTERVAL: 398 MS
QTC CALCULATION(BAZETT): 456 MS
QTC FREDERICIA: 436 MS
R AXIS: 48 DEGREES
T AXIS: 1 DEGREES
T OFFSET: 420 MS
VENTRICULAR RATE: 79 BPM

## 2024-02-02 ENCOUNTER — HOSPITAL ENCOUNTER (INPATIENT)
Facility: HOSPITAL | Age: 44
LOS: 5 days | Discharge: HOME | End: 2024-02-07
Attending: STUDENT IN AN ORGANIZED HEALTH CARE EDUCATION/TRAINING PROGRAM | Admitting: INTERNAL MEDICINE
Payer: COMMERCIAL

## 2024-02-02 ENCOUNTER — APPOINTMENT (OUTPATIENT)
Dept: CARDIOLOGY | Facility: HOSPITAL | Age: 44
End: 2024-02-02
Payer: COMMERCIAL

## 2024-02-02 ENCOUNTER — APPOINTMENT (OUTPATIENT)
Dept: RADIOLOGY | Facility: HOSPITAL | Age: 44
End: 2024-02-02
Payer: COMMERCIAL

## 2024-02-02 DIAGNOSIS — F10.930 ALCOHOL WITHDRAWAL SYNDROME, UNCOMPLICATED (MULTI): ICD-10-CM

## 2024-02-02 DIAGNOSIS — F13.932: Primary | ICD-10-CM

## 2024-02-02 LAB
ALBUMIN SERPL BCP-MCNC: 3.5 G/DL (ref 3.4–5)
ALBUMIN SERPL BCP-MCNC: 3.9 G/DL (ref 3.4–5)
ALP SERPL-CCNC: 72 U/L (ref 33–120)
ALT SERPL W P-5'-P-CCNC: 93 U/L (ref 10–52)
AMPHETAMINES UR QL SCN: ABNORMAL
ANION GAP SERPL CALC-SCNC: 10 MMOL/L (ref 10–20)
ANION GAP SERPL CALC-SCNC: 12 MMOL/L (ref 10–20)
APAP SERPL-MCNC: <10 UG/ML
APTT PPP: 32 SECONDS (ref 27–38)
AST SERPL W P-5'-P-CCNC: 49 U/L (ref 9–39)
BARBITURATES UR QL SCN: ABNORMAL
BASOPHILS # BLD AUTO: 0.06 X10*3/UL (ref 0–0.1)
BASOPHILS NFR BLD AUTO: 0.9 %
BENZODIAZ UR QL SCN: ABNORMAL
BILIRUB SERPL-MCNC: 0.6 MG/DL (ref 0–1.2)
BUN SERPL-MCNC: 10 MG/DL (ref 6–23)
BUN SERPL-MCNC: 8 MG/DL (ref 6–23)
BZE UR QL SCN: ABNORMAL
CALCIUM SERPL-MCNC: 9.2 MG/DL (ref 8.6–10.3)
CALCIUM SERPL-MCNC: 9.5 MG/DL (ref 8.6–10.3)
CANNABINOIDS UR QL SCN: ABNORMAL
CHLORIDE SERPL-SCNC: 101 MMOL/L (ref 98–107)
CHLORIDE SERPL-SCNC: 99 MMOL/L (ref 98–107)
CO2 SERPL-SCNC: 27 MMOL/L (ref 21–32)
CO2 SERPL-SCNC: 28 MMOL/L (ref 21–32)
CREAT SERPL-MCNC: 0.89 MG/DL (ref 0.5–1.3)
CREAT SERPL-MCNC: 1.03 MG/DL (ref 0.5–1.3)
EGFRCR SERPLBLD CKD-EPI 2021: >90 ML/MIN/1.73M*2
EGFRCR SERPLBLD CKD-EPI 2021: >90 ML/MIN/1.73M*2
EOSINOPHIL # BLD AUTO: 0.15 X10*3/UL (ref 0–0.7)
EOSINOPHIL NFR BLD AUTO: 2.3 %
ERYTHROCYTE [DISTWIDTH] IN BLOOD BY AUTOMATED COUNT: 13 % (ref 11.5–14.5)
ETHANOL SERPL-MCNC: <10 MG/DL
FENTANYL+NORFENTANYL UR QL SCN: ABNORMAL
FLUAV RNA RESP QL NAA+PROBE: NOT DETECTED
FLUBV RNA RESP QL NAA+PROBE: NOT DETECTED
GLUCOSE SERPL-MCNC: 144 MG/DL (ref 74–99)
GLUCOSE SERPL-MCNC: 170 MG/DL (ref 74–99)
HCT VFR BLD AUTO: 43.6 % (ref 41–52)
HGB BLD-MCNC: 14.8 G/DL (ref 13.5–17.5)
HOLD SPECIMEN: NORMAL
HOLD SPECIMEN: NORMAL
IMM GRANULOCYTES # BLD AUTO: 0.02 X10*3/UL (ref 0–0.7)
IMM GRANULOCYTES NFR BLD AUTO: 0.3 % (ref 0–0.9)
INR PPP: 1.1 (ref 0.9–1.1)
LYMPHOCYTES # BLD AUTO: 2.03 X10*3/UL (ref 1.2–4.8)
LYMPHOCYTES NFR BLD AUTO: 31.4 %
MAGNESIUM SERPL-MCNC: 1.87 MG/DL (ref 1.6–2.4)
MCH RBC QN AUTO: 34.3 PG (ref 26–34)
MCHC RBC AUTO-ENTMCNC: 33.9 G/DL (ref 32–36)
MCV RBC AUTO: 101 FL (ref 80–100)
MONOCYTES # BLD AUTO: 0.51 X10*3/UL (ref 0.1–1)
MONOCYTES NFR BLD AUTO: 7.9 %
NEUTROPHILS # BLD AUTO: 3.69 X10*3/UL (ref 1.2–7.7)
NEUTROPHILS NFR BLD AUTO: 57.2 %
NRBC BLD-RTO: 0 /100 WBCS (ref 0–0)
OPIATES UR QL SCN: ABNORMAL
OXYCODONE+OXYMORPHONE UR QL SCN: ABNORMAL
PCP UR QL SCN: ABNORMAL
PHOSPHATE SERPL-MCNC: 2.6 MG/DL (ref 2.5–4.9)
PLATELET # BLD AUTO: 203 X10*3/UL (ref 150–450)
POTASSIUM SERPL-SCNC: 3.4 MMOL/L (ref 3.5–5.3)
POTASSIUM SERPL-SCNC: 3.6 MMOL/L (ref 3.5–5.3)
PROT SERPL-MCNC: 7 G/DL (ref 6.4–8.2)
PROTHROMBIN TIME: 12 SECONDS (ref 9.8–12.8)
RBC # BLD AUTO: 4.32 X10*6/UL (ref 4.5–5.9)
SALICYLATES SERPL-MCNC: <3 MG/DL
SARS-COV-2 RNA RESP QL NAA+PROBE: NOT DETECTED
SODIUM SERPL-SCNC: 135 MMOL/L (ref 136–145)
SODIUM SERPL-SCNC: 135 MMOL/L (ref 136–145)
WBC # BLD AUTO: 6.5 X10*3/UL (ref 4.4–11.3)

## 2024-02-02 PROCEDURE — 80346 BENZODIAZEPINES1-12: CPT | Mod: STJLAB | Performed by: EMERGENCY MEDICINE

## 2024-02-02 PROCEDURE — 84075 ASSAY ALKALINE PHOSPHATASE: CPT | Performed by: EMERGENCY MEDICINE

## 2024-02-02 PROCEDURE — 2500000004 HC RX 250 GENERAL PHARMACY W/ HCPCS (ALT 636 FOR OP/ED)

## 2024-02-02 PROCEDURE — 99285 EMERGENCY DEPT VISIT HI MDM: CPT | Performed by: STUDENT IN AN ORGANIZED HEALTH CARE EDUCATION/TRAINING PROGRAM

## 2024-02-02 PROCEDURE — 1200000002 HC GENERAL ROOM WITH TELEMETRY DAILY

## 2024-02-02 PROCEDURE — 93010 ELECTROCARDIOGRAM REPORT: CPT | Performed by: INTERNAL MEDICINE

## 2024-02-02 PROCEDURE — 2500000004 HC RX 250 GENERAL PHARMACY W/ HCPCS (ALT 636 FOR OP/ED): Performed by: STUDENT IN AN ORGANIZED HEALTH CARE EDUCATION/TRAINING PROGRAM

## 2024-02-02 PROCEDURE — 93005 ELECTROCARDIOGRAM TRACING: CPT

## 2024-02-02 PROCEDURE — 85610 PROTHROMBIN TIME: CPT | Performed by: STUDENT IN AN ORGANIZED HEALTH CARE EDUCATION/TRAINING PROGRAM

## 2024-02-02 PROCEDURE — 36415 COLL VENOUS BLD VENIPUNCTURE: CPT | Performed by: STUDENT IN AN ORGANIZED HEALTH CARE EDUCATION/TRAINING PROGRAM

## 2024-02-02 PROCEDURE — 2500000002 HC RX 250 W HCPCS SELF ADMINISTERED DRUGS (ALT 637 FOR MEDICARE OP, ALT 636 FOR OP/ED): Performed by: PHYSICIAN ASSISTANT

## 2024-02-02 PROCEDURE — 83735 ASSAY OF MAGNESIUM: CPT | Performed by: PHYSICIAN ASSISTANT

## 2024-02-02 PROCEDURE — 80143 DRUG ASSAY ACETAMINOPHEN: CPT | Performed by: EMERGENCY MEDICINE

## 2024-02-02 PROCEDURE — 87636 SARSCOV2 & INF A&B AMP PRB: CPT | Performed by: EMERGENCY MEDICINE

## 2024-02-02 PROCEDURE — S4991 NICOTINE PATCH NONLEGEND: HCPCS | Performed by: PHYSICIAN ASSISTANT

## 2024-02-02 PROCEDURE — 80307 DRUG TEST PRSMV CHEM ANLYZR: CPT | Performed by: EMERGENCY MEDICINE

## 2024-02-02 PROCEDURE — 85025 COMPLETE CBC W/AUTO DIFF WBC: CPT | Performed by: EMERGENCY MEDICINE

## 2024-02-02 PROCEDURE — 96376 TX/PRO/DX INJ SAME DRUG ADON: CPT

## 2024-02-02 PROCEDURE — 36415 COLL VENOUS BLD VENIPUNCTURE: CPT | Performed by: EMERGENCY MEDICINE

## 2024-02-02 PROCEDURE — 96374 THER/PROPH/DIAG INJ IV PUSH: CPT

## 2024-02-02 PROCEDURE — 80069 RENAL FUNCTION PANEL: CPT | Mod: CCI | Performed by: PHYSICIAN ASSISTANT

## 2024-02-02 PROCEDURE — 2500000004 HC RX 250 GENERAL PHARMACY W/ HCPCS (ALT 636 FOR OP/ED): Performed by: PHYSICIAN ASSISTANT

## 2024-02-02 PROCEDURE — 2500000001 HC RX 250 WO HCPCS SELF ADMINISTERED DRUGS (ALT 637 FOR MEDICARE OP): Performed by: PHYSICIAN ASSISTANT

## 2024-02-02 PROCEDURE — 99222 1ST HOSP IP/OBS MODERATE 55: CPT | Performed by: PHYSICIAN ASSISTANT

## 2024-02-02 PROCEDURE — 71045 X-RAY EXAM CHEST 1 VIEW: CPT | Performed by: STUDENT IN AN ORGANIZED HEALTH CARE EDUCATION/TRAINING PROGRAM

## 2024-02-02 PROCEDURE — 93010 ELECTROCARDIOGRAM REPORT: CPT | Performed by: STUDENT IN AN ORGANIZED HEALTH CARE EDUCATION/TRAINING PROGRAM

## 2024-02-02 PROCEDURE — 71045 X-RAY EXAM CHEST 1 VIEW: CPT

## 2024-02-02 RX ORDER — LORAZEPAM 2 MG/ML
2 INJECTION INTRAMUSCULAR EVERY 2 HOUR PRN
Status: DISCONTINUED | OUTPATIENT
Start: 2024-02-02 | End: 2024-02-02

## 2024-02-02 RX ORDER — ACETAMINOPHEN 160 MG/5ML
650 SOLUTION ORAL EVERY 4 HOURS PRN
Status: DISCONTINUED | OUTPATIENT
Start: 2024-02-02 | End: 2024-02-07 | Stop reason: HOSPADM

## 2024-02-02 RX ORDER — ENOXAPARIN SODIUM 100 MG/ML
40 INJECTION SUBCUTANEOUS EVERY 24 HOURS
Status: DISCONTINUED | OUTPATIENT
Start: 2024-02-02 | End: 2024-02-07 | Stop reason: HOSPADM

## 2024-02-02 RX ORDER — GABAPENTIN 600 MG/1
600 TABLET ORAL 3 TIMES DAILY
COMMUNITY

## 2024-02-02 RX ORDER — LANOLIN ALCOHOL/MO/W.PET/CERES
100 CREAM (GRAM) TOPICAL DAILY
Status: DISCONTINUED | OUTPATIENT
Start: 2024-02-02 | End: 2024-02-07 | Stop reason: HOSPADM

## 2024-02-02 RX ORDER — BISACODYL 5 MG
10 TABLET, DELAYED RELEASE (ENTERIC COATED) ORAL DAILY PRN
Status: DISCONTINUED | OUTPATIENT
Start: 2024-02-02 | End: 2024-02-07 | Stop reason: HOSPADM

## 2024-02-02 RX ORDER — VENLAFAXINE HYDROCHLORIDE 150 MG/1
150 CAPSULE, EXTENDED RELEASE ORAL DAILY
COMMUNITY

## 2024-02-02 RX ORDER — DIAZEPAM 5 MG/ML
10 INJECTION, SOLUTION INTRAMUSCULAR; INTRAVENOUS EVERY 2 HOUR PRN
Status: DISCONTINUED | OUTPATIENT
Start: 2024-02-02 | End: 2024-02-03

## 2024-02-02 RX ORDER — NICOTINE 7MG/24HR
1 PATCH, TRANSDERMAL 24 HOURS TRANSDERMAL DAILY
Status: DISCONTINUED | OUTPATIENT
Start: 2024-03-15 | End: 2024-02-07 | Stop reason: HOSPADM

## 2024-02-02 RX ORDER — LORAZEPAM 2 MG/ML
1 INJECTION INTRAMUSCULAR EVERY 2 HOUR PRN
Status: DISCONTINUED | OUTPATIENT
Start: 2024-02-02 | End: 2024-02-02

## 2024-02-02 RX ORDER — MULTIVIT-MIN/IRON FUM/FOLIC AC 7.5 MG-4
1 TABLET ORAL DAILY
Status: DISCONTINUED | OUTPATIENT
Start: 2024-02-02 | End: 2024-02-07 | Stop reason: HOSPADM

## 2024-02-02 RX ORDER — ONDANSETRON HYDROCHLORIDE 2 MG/ML
4 INJECTION, SOLUTION INTRAVENOUS EVERY 8 HOURS PRN
Status: DISCONTINUED | OUTPATIENT
Start: 2024-02-02 | End: 2024-02-07 | Stop reason: HOSPADM

## 2024-02-02 RX ORDER — PANTOPRAZOLE SODIUM 40 MG/1
40 TABLET, DELAYED RELEASE ORAL
Status: DISCONTINUED | OUTPATIENT
Start: 2024-02-03 | End: 2024-02-07 | Stop reason: HOSPADM

## 2024-02-02 RX ORDER — ACETAMINOPHEN 325 MG/1
650 TABLET ORAL EVERY 4 HOURS PRN
Status: DISCONTINUED | OUTPATIENT
Start: 2024-02-02 | End: 2024-02-07 | Stop reason: HOSPADM

## 2024-02-02 RX ORDER — LORAZEPAM 2 MG/ML
0.5 INJECTION INTRAMUSCULAR EVERY 2 HOUR PRN
Status: DISCONTINUED | OUTPATIENT
Start: 2024-02-02 | End: 2024-02-02

## 2024-02-02 RX ORDER — FOLIC ACID 1 MG/1
1 TABLET ORAL DAILY
Status: DISCONTINUED | OUTPATIENT
Start: 2024-02-02 | End: 2024-02-07 | Stop reason: HOSPADM

## 2024-02-02 RX ORDER — ONDANSETRON 4 MG/1
4 TABLET, ORALLY DISINTEGRATING ORAL EVERY 8 HOURS PRN
Status: DISCONTINUED | OUTPATIENT
Start: 2024-02-02 | End: 2024-02-07 | Stop reason: HOSPADM

## 2024-02-02 RX ORDER — AMOXICILLIN 250 MG
2 CAPSULE ORAL 2 TIMES DAILY
Status: DISCONTINUED | OUTPATIENT
Start: 2024-02-02 | End: 2024-02-07 | Stop reason: HOSPADM

## 2024-02-02 RX ORDER — ACETAMINOPHEN 650 MG/1
650 SUPPOSITORY RECTAL EVERY 4 HOURS PRN
Status: DISCONTINUED | OUTPATIENT
Start: 2024-02-02 | End: 2024-02-07 | Stop reason: HOSPADM

## 2024-02-02 RX ORDER — IBUPROFEN 200 MG
1 TABLET ORAL DAILY
Status: DISCONTINUED | OUTPATIENT
Start: 2024-02-02 | End: 2024-02-07 | Stop reason: HOSPADM

## 2024-02-02 RX ORDER — PANTOPRAZOLE SODIUM 40 MG/10ML
40 INJECTION, POWDER, LYOPHILIZED, FOR SOLUTION INTRAVENOUS
Status: DISCONTINUED | OUTPATIENT
Start: 2024-02-03 | End: 2024-02-07 | Stop reason: HOSPADM

## 2024-02-02 RX ORDER — POTASSIUM CHLORIDE 20 MEQ/1
40 TABLET, EXTENDED RELEASE ORAL ONCE
Status: COMPLETED | OUTPATIENT
Start: 2024-02-02 | End: 2024-02-02

## 2024-02-02 RX ORDER — ONDANSETRON HYDROCHLORIDE 2 MG/ML
INJECTION, SOLUTION INTRAVENOUS
Status: COMPLETED
Start: 2024-02-02 | End: 2024-02-02

## 2024-02-02 RX ORDER — LANOLIN ALCOHOL/MO/W.PET/CERES
800 CREAM (GRAM) TOPICAL ONCE
Status: COMPLETED | OUTPATIENT
Start: 2024-02-02 | End: 2024-02-02

## 2024-02-02 RX ADMIN — DIAZEPAM 10 MG: 10 INJECTION, SOLUTION INTRAMUSCULAR; INTRAVENOUS at 14:11

## 2024-02-02 RX ADMIN — ENOXAPARIN SODIUM 40 MG: 40 INJECTION SUBCUTANEOUS at 20:52

## 2024-02-02 RX ADMIN — NICOTINE 1 PATCH: 14 PATCH, EXTENDED RELEASE TRANSDERMAL at 20:36

## 2024-02-02 RX ADMIN — FOLIC ACID 1 MG: 1 TABLET ORAL at 20:53

## 2024-02-02 RX ADMIN — Medication 800 MG: at 22:32

## 2024-02-02 RX ADMIN — ONDANSETRON 4 MG: 2 INJECTION INTRAMUSCULAR; INTRAVENOUS at 11:39

## 2024-02-02 RX ADMIN — DIAZEPAM 10 MG: 10 INJECTION, SOLUTION INTRAMUSCULAR; INTRAVENOUS at 11:39

## 2024-02-02 RX ADMIN — Medication 1 TABLET: at 20:52

## 2024-02-02 RX ADMIN — THIAMINE HCL TAB 100 MG 100 MG: 100 TAB at 20:52

## 2024-02-02 RX ADMIN — DIAZEPAM 10 MG: 10 INJECTION, SOLUTION INTRAMUSCULAR; INTRAVENOUS at 22:32

## 2024-02-02 RX ADMIN — DIAZEPAM 10 MG: 10 INJECTION, SOLUTION INTRAMUSCULAR; INTRAVENOUS at 20:36

## 2024-02-02 RX ADMIN — POTASSIUM CHLORIDE 40 MEQ: 1500 TABLET, EXTENDED RELEASE ORAL at 22:32

## 2024-02-02 RX ADMIN — DIAZEPAM 10 MG: 10 INJECTION, SOLUTION INTRAMUSCULAR; INTRAVENOUS at 17:37

## 2024-02-02 RX ADMIN — ACETAMINOPHEN 650 MG: 325 TABLET ORAL at 20:51

## 2024-02-02 SDOH — HEALTH STABILITY: MENTAL HEALTH: SLEEP PATTERN: DIFFICULTY FALLING ASLEEP

## 2024-02-02 SDOH — HEALTH STABILITY: MENTAL HEALTH: BEHAVIORS/MOOD: ANXIOUS

## 2024-02-02 SDOH — HEALTH STABILITY: MENTAL HEALTH

## 2024-02-02 ASSESSMENT — PAIN - FUNCTIONAL ASSESSMENT
PAIN_FUNCTIONAL_ASSESSMENT: 0-10
PAIN_FUNCTIONAL_ASSESSMENT: 0-10

## 2024-02-02 ASSESSMENT — LIFESTYLE VARIABLES
TOTAL SCORE: 7
TREMOR: NO TREMOR
TREMOR: NOT VISIBLE, BUT CAN BE FELT FINGERTIP TO FINGERTIP
TOTAL_SCORE: 14
NAUSEA AND VOMITING: 2
VISUAL DISTURBANCES: VERY MILD SENSITIVITY
AGITATION: SOMEWHAT MORE THAN NORMAL ACTIVITY
AUDITORY DISTURBANCES: NOT PRESENT
PAROXYSMAL SWEATS: BARELY PERCEPTIBLE SWEATING, PALMS MOIST
HEADACHE, FULLNESS IN HEAD: NOT PRESENT
TREMOR: NOT VISIBLE, BUT CAN BE FELT FINGERTIP TO FINGERTIP
VISUAL DISTURBANCES: NOT PRESENT
VISUAL DISTURBANCES: NOT PRESENT
PULSE: 70
VISUAL DISTURBANCES: VERY MILD SENSITIVITY
HEADACHE, FULLNESS IN HEAD: MILD
HAVE YOU EVER FELT YOU SHOULD CUT DOWN ON YOUR DRINKING: NO
TOTAL SCORE: 15
HEADACHE, FULLNESS IN HEAD: VERY MILD
AGITATION: SOMEWHAT MORE THAN NORMAL ACTIVITY
ORIENTATION AND CLOUDING OF SENSORIUM: ORIENTED AND CAN DO SERIAL ADDITIONS
ANXIETY: MILDLY ANXIOUS
TACTILE DISTURBANCES: VERY MILD ITCHING, PINS AND NEEDLES, BURNING OR NUMBNESS
PULSE: 78
TOTAL SCORE: 10
ORIENTATION AND CLOUDING OF SENSORIUM: ORIENTED AND CAN DO SERIAL ADDITIONS
TACTILE DISTURBANCES: VERY MILD ITCHING, PINS AND NEEDLES, BURNING OR NUMBNESS
AUDITORY DISTURBANCES: NOT PRESENT
HAVE PEOPLE ANNOYED YOU BY CRITICIZING YOUR DRINKING: NO
NAUSEA AND VOMITING: MILD NAUSEA WITH NO VOMITING
PAROXYSMAL SWEATS: BARELY PERCEPTIBLE SWEATING, PALMS MOIST
ORIENTATION AND CLOUDING OF SENSORIUM: ORIENTED AND CAN DO SERIAL ADDITIONS
PAROXYSMAL SWEATS: BARELY PERCEPTIBLE SWEATING, PALMS MOIST
AUDITORY DISTURBANCES: NOT PRESENT
ANXIETY: 2
TOTAL SCORE: 8
TACTILE DISTURBANCES: VERY MILD ITCHING, PINS AND NEEDLES, BURNING OR NUMBNESS
TREMOR: 3
TACTILE DISTURBANCES: MILD ITCHING, PINS AND NEEDLES, BURNING OR NUMBNESS
HEADACHE, FULLNESS IN HEAD: MODERATE
EVER FELT BAD OR GUILTY ABOUT YOUR DRINKING: NO
NAUSEA AND VOMITING: MILD NAUSEA WITH NO VOMITING
ANXIETY: MODERATELY ANXIOUS, OR GUARDED, SO ANXIETY IS INFERRED
AGITATION: SOMEWHAT MORE THAN NORMAL ACTIVITY
AUDITORY DISTURBANCES: NOT PRESENT
PAROXYSMAL SWEATS: BARELY PERCEPTIBLE SWEATING, PALMS MOIST
ANXIETY: 2
NAUSEA AND VOMITING: 2
AGITATION: NORMAL ACTIVITY
ORIENTATION AND CLOUDING OF SENSORIUM: ORIENTED AND CAN DO SERIAL ADDITIONS
EVER HAD A DRINK FIRST THING IN THE MORNING TO STEADY YOUR NERVES TO GET RID OF A HANGOVER: NO

## 2024-02-02 ASSESSMENT — COGNITIVE AND FUNCTIONAL STATUS - GENERAL
PATIENT BASELINE BEDBOUND: NO
MOBILITY SCORE: 24
MOBILITY SCORE: 24
PATIENT BASELINE BEDBOUND: NO
MOBILITY SCORE: 24
DAILY ACTIVITIY SCORE: 24
DAILY ACTIVITIY SCORE: 24

## 2024-02-02 ASSESSMENT — ACTIVITIES OF DAILY LIVING (ADL)
WALKS IN HOME: INDEPENDENT
ADEQUATE_TO_COMPLETE_ADL: YES
TOILETING: INDEPENDENT
PATIENT'S MEMORY ADEQUATE TO SAFELY COMPLETE DAILY ACTIVITIES?: YES
DRESSING YOURSELF: INDEPENDENT
BATHING: INDEPENDENT
FEEDING YOURSELF: INDEPENDENT
GROOMING: INDEPENDENT
HEARING - LEFT EAR: FUNCTIONAL
LACK_OF_TRANSPORTATION: NO
JUDGMENT_ADEQUATE_SAFELY_COMPLETE_DAILY_ACTIVITIES: YES
HEARING - RIGHT EAR: FUNCTIONAL

## 2024-02-02 ASSESSMENT — COLUMBIA-SUICIDE SEVERITY RATING SCALE - C-SSRS
2. HAVE YOU ACTUALLY HAD ANY THOUGHTS OF KILLING YOURSELF?: NO
6. HAVE YOU EVER DONE ANYTHING, STARTED TO DO ANYTHING, OR PREPARED TO DO ANYTHING TO END YOUR LIFE?: NO
1. IN THE PAST MONTH, HAVE YOU WISHED YOU WERE DEAD OR WISHED YOU COULD GO TO SLEEP AND NOT WAKE UP?: NO

## 2024-02-02 ASSESSMENT — PAIN DESCRIPTION - LOCATION: LOCATION: HEAD

## 2024-02-02 ASSESSMENT — PAIN SCALES - GENERAL
PAINLEVEL_OUTOF10: 7
PAINLEVEL_OUTOF10: 6
PAINLEVEL_OUTOF10: 0 - NO PAIN

## 2024-02-02 NOTE — ED PROVIDER NOTES
EMERGENCY DEPARTMENT ENCOUNTER      Pt Name: Deyvi Araujo  MRN: [id number]  Birthdate 1980  Date of evaluation: 2/2/2024  Provider: Ganesh Fall DO    CHIEF COMPLAINT       Chief Complaint   Patient presents with    Anxiety     Pt reports that he is going through benzo withdrawal. Reports that he was seen at [facility] for CP last night and his concern as dismissed. Pt would like to get treatment for benzos.   Reports intense anxiety. Denies SI/ HI/ hallucinations.      HISTORY OF PRESENT ILLNESS    Deyvi Araujo is a 43 y.o. year old male who presents to the ER for benzodiazepine withdrawal.  She states that his last dose was Tuesday evening, Klonopin 6 mg.  He was able to withdraw 1 year ago, stay sober for 8 months, relapsed 4 months ago.  In the past he has gone through [facility], he is not interested in rehab with them again.  His current symptoms include fatigue, headache, skin crawling, restless legs,, bones on fire, distorted reality, vomiting and diarrhea, sinus congestion, decreased urine output.  He has been on Suboxone for 11 years.  He endorses history of prediabetes otherwise.  No known drug allergies.  Endorses tobacco use, former alcohol use, never IV drug use.     PAST MEDICAL HISTORY     Past Medical History:   Diagnosis Date    Alcohol abuse     Anxiety and depression     GERD (gastroesophageal reflux disease)     HLD (hyperlipidemia)     HTN (hypertension)     Obesity (BMI 30.0-34.9)     Polysubstance abuse (CMS/[facility])     PTSD (post-traumatic stress disorder)      CURRENT MEDICATIONS       Previous Medications    AMLODIPINE (NORVASC) 10 MG TABLET    Take 1 tablet (10 mg) by mouth once daily for 14 days.    BUPRENORPHINE-NALOXONE (SUBOXONE) 8-2 MG SL TABLET    Place 2 tablets under the tongue once daily.    FOLIC ACID (FOLVITE) 1 MG TABLET    Take 1 tablet (1 mg) by mouth once daily. Do not start before January 8, 2024.    GABAPENTIN (NEURONTIN) 600 MG TABLET    Take 1 tablet (600  mg) by mouth 3 times a day for 14 days.    GABAPENTIN (NEURONTIN) 600 MG TABLET    Take 1 tablet (600 mg) by mouth 3 times a day.    HYDROXYZINE HCL (ATARAX) 25 MG TABLET    Take 1 tablet (25 mg) by mouth 3 times a day for 14 days.    MULTIVITAMIN WITH MINERALS TABLET    Take 1 tablet by mouth once daily. Do not start before January 8, 2024.    NICOTINE (NICODERM CQ) 14 MG/24 HR PATCH    Place 1 patch over 24 hours on the skin once daily for 28 doses. Do not start before January 8, 2024.    OMEPRAZOLE (PRILOSEC) 20 MG DR CAPSULE    Take 1 capsule (20 mg) by mouth once daily in the morning. Take before meals for 14 days.    QUETIAPINE (SEROQUEL) 25 MG TABLET    Take 1 tablet (25 mg) by mouth once daily at bedtime.    THIAMINE (VITAMIN B-1) 100 MG TABLET    Take 1 tablet (100 mg) by mouth once daily. Do not start before January 8, 2024.    VENLAFAXINE XR (EFFEXOR-XR) 150 MG 24 HR CAPSULE    Take 1 capsule (150 mg) by mouth once daily for 14 days.    VENLAFAXINE XR (EFFEXOR-XR) 150 MG 24 HR CAPSULE    Take 1 capsule (150 mg) by mouth once daily.     SURGICAL HISTORY     History reviewed. No pertinent surgical history.  ALLERGIES     Patient has no known allergies.  FAMILY HISTORY       Family History   Problem Relation Name Age of Onset    Alcohol abuse Father       SOCIAL HISTORY       Social History     Tobacco Use    Smoking status: Every Day     Packs/day: .5     Types: Cigarettes    Smokeless tobacco: Never   Substance Use Topics    Alcohol use: Not on file    Drug use: Not Currently     Types: Benzodiazepines     PHYSICAL EXAM  (up to 7 for level 4, 8 or more for level 5)     ED Triage Vitals [02/02/24 1014]   Temperature Heart Rate Respirations BP   36.7 °C (98.1 °F) 94 20 (!) 157/102      Pulse Ox Temp Source Heart Rate Source Patient Position   98 % Temporal Monitor Sitting      BP Location FiO2 (%)     Right arm --       Physical Exam  Vitals and nursing note reviewed.   Constitutional:       General: He is  not in acute distress.     Appearance: Normal appearance. He is not ill-appearing.   HENT:      Head: Normocephalic and atraumatic. No contusion or laceration.      Jaw: No trismus or malocclusion.      Right Ear: External ear normal.      Left Ear: External ear normal.      Mouth/Throat:      Mouth: Mucous membranes are moist.      Pharynx: Oropharynx is clear.   Eyes:      Extraocular Movements: Extraocular movements intact.      Pupils: Pupils are equal, round, and reactive to light.   Neck:      Trachea: No tracheal deviation.   Cardiovascular:      Rate and Rhythm: Normal rate and regular rhythm.      Pulses: Normal pulses.   Pulmonary:      Effort: No respiratory distress.      Breath sounds: No wheezing, rhonchi or rales.   Chest:      Chest wall: No tenderness.   Abdominal:      General: Abdomen is flat. There is no distension.      Palpations: Abdomen is soft. There is no mass.      Tenderness: There is no abdominal tenderness. There is no right CVA tenderness or left CVA tenderness.   Musculoskeletal:         General: No tenderness or signs of injury.      Cervical back: Normal range of motion. No tenderness.      Right lower leg: No edema.      Left lower leg: No edema.   Skin:     Coloration: Skin is not jaundiced or pale.      Findings: No rash or wound.   Neurological:      General: No focal deficit present.      Mental Status: He is alert. Mental status is at baseline.   Psychiatric:         Mood and Affect: Mood is anxious.         Speech: Speech is rapid and pressured.         Behavior: Behavior normal. Behavior is not agitated, aggressive or combative.         Thought Content: Thought content does not include homicidal or suicidal ideation.         Judgment: Judgment normal.        DIAGNOSTIC RESULTS   LABS:  Labs Reviewed   DRUG SCREEN, URINE WITH REFLEX TO CONFIRMATION - Abnormal       Result Value    Amphetamine Screen, Urine Presumptive Negative      Barbiturate Screen, Urine Presumptive  Negative      Benzodiazepines Screen, Urine Presumptive Positive (*)     Cannabinoid Screen, Urine Presumptive Negative      Cocaine Metabolite Screen, Urine Presumptive Negative      Fentanyl Screen, Urine Presumptive Negative      Opiate Screen, Urine Presumptive Negative      Oxycodone Screen, Urine Presumptive Negative      PCP Screen, Urine Presumptive Negative      Narrative:     Drug screen results are presumptive and should not be used to assess   compliance with prescribed medication. Definitive confirmatory drug testing   has been added to this sample for any positive screen result and will be  reported separately.     Toxicology screening results are reported qualitatively. The concentration must  be greater than or equal to the cutoff to be reported as positive. The concentration   at which the screening test can detect an individual drug or metabolite varies.   The absence of expected drug(s) and/or drug metabolite(s) may indicate non-compliance,   inappropriate timing of specimen collection relative to drug administration, poor drug   absorption, diluted/adulterated urine, or limitations of testing. For medical purposes   only; not valid for forensic use.    Interpretive questions should be directed to the laboratory medical directors.   CBC WITH AUTO DIFFERENTIAL - Abnormal    WBC 6.5      nRBC 0.0      RBC 4.32 (*)     Hemoglobin 14.8      Hematocrit 43.6       (*)     MCH 34.3 (*)     MCHC 33.9      RDW 13.0      Platelets 203      Neutrophils % 57.2      Immature Granulocytes %, Automated 0.3      Lymphocytes % 31.4      Monocytes % 7.9      Eosinophils % 2.3      Basophils % 0.9      Neutrophils Absolute 3.69      Immature Granulocytes Absolute, Automated 0.02      Lymphocytes Absolute 2.03      Monocytes Absolute 0.51      Eosinophils Absolute 0.15      Basophils Absolute 0.06     COMPREHENSIVE METABOLIC PANEL - Abnormal    Glucose 144 (*)     Sodium 135 (*)     Potassium 3.6      Chloride  99      Bicarbonate 28      Anion Gap 12      Urea Nitrogen 8      Creatinine 0.89      eGFR >90      Calcium 9.5      Albumin 3.9      Alkaline Phosphatase 72      Total Protein 7.0      AST 49 (*)     Bilirubin, Total 0.6      ALT 93 (*)    COAGULATION SCREEN - Normal    Protime 12.0      INR 1.1      aPTT 32      Narrative:     The APTT is no longer used for monitoring Unfractionated Heparin Therapy. For monitoring Heparin Therapy, use the Heparin Assay.   SARS-COV-2 AND INFLUENZA A/B PCR - Normal    Flu A Result Not Detected      Flu B Result Not Detected      Coronavirus 2019, PCR Not Detected      Narrative:     This assay has received FDA Emergency Use Authorization (EUA) and  is only authorized for the duration of time that circumstances exist to justify the authorization of the emergency use of in vitro diagnostic tests for the detection of SARS-CoV-2 virus and/or diagnosis of COVID-19 infection under section 564(b)(1) of the Act, 21 U.S.C. 360bbb-3(b)(1). Testing for SARS-CoV-2 is only recommended for patients who meet current clinical and/or epidemiological criteria as defined by federal, state, or local public health directives. This assay is an in vitro diagnostic nucleic acid amplification test for the qualitative detection of SARS-CoV-2, Influenza A, and Influenza B from nasopharyngeal specimens and has been validated for use at [facility]. Negative results do not preclude COVID-19 infections or Influenza A/B infections, and should not be used as the sole basis for diagnosis, treatment, or other management decisions. If Influenza A/B and RSV PCR results are negative, testing for Parainfluenza virus, Adenovirus and Metapneumovirus is routinely performed for [facility] [place] [place] in[place], and is available on other patients by placing an add-on request.    ACUTE TOXICOLOGY PANEL, BLOOD - Normal    Acetaminophen <10.0      Salicylate  <3      Alcohol <10      STERILE CUP    Extra Tube Hold for add-ons.     BENZODIAZEPINES CONFIRMATION, URINE   OOB INTERNAL TRACKING     All other labs were within normal range or not returned as of this dictation.  Imaging  No orders to display      Procedure  Procedures  EMERGENCY DEPARTMENT COURSE/MDM:   Medical Decision Making    Vitals:    Vitals:    02/02/24 1500 02/02/24 1600 02/02/24 1700 02/02/24 1820   BP: 147/80 143/69 149/90 129/80   BP Location:       Patient Position:       Pulse: 70 74 72 88   Resp: 17 11 13 18   Temp:       TempSrc:       SpO2: 95% 96% 96% 97%   Weight:       Height:         Deyvi Araujo is a male 43 y.o. who presents to the ER for benzodiazepine withdrawal. On arrival the patients vital signs were: Afebrile, Reguar HR, Normotensive, Regular RR, and Oxygenating well on room air. History obtained from: patient.  Given benzo withdrawal, CIWA initiated.  Initial CIWA score of 14, diazepam every 2 as needed ordered per protocol.  Additionally Zofran provided for antiemesis.    EKG 1008 Rhythm: Sinus Ventricular rate: 93 Intervals (-200 /QRS  /QT >450M or >470F):  QRS 86 Qtc 472 Blocks: None Potential signs of ischemia: No pathological Q waves, contiguous ST elevations or depressions, biphasic T waves, or new T wave inversions. T inversions in lead III and avf previously seen 01/29/2024    My independent interpretation of Labs:   CBC: No acute pathological leukocytosis, leukopenia, thrombocytosis, thrombocytopenia, or anemia  CMP: No acute electrolyte or hepatorenal abnormality  PT/PTT/INR: No acute coagulopathy  Viral swabs: influenza A/B negative, Covid negative, RSV negative  Urine drug screen shows benzo use history, no additional intoxicating substances present  Acute toxicology panel: No elevation of ASA, APAP, or EtOH levels    On reassessment CIWA 15, additional diazepam provided    Diagnoses as of 02/02/24 1911   Benzodiazepine withdrawal with perceptual disturbance (CMS/HCC)        Consultant discussions: none  Diagnostic testing considered but not performed: None  External Records Reviewed: I reviewed recent and relevant outside records including inpatient notes, outpatient records  Social Determinants Affecting Care: prior history of 8 month remission  Prescription Drug Consideration: none    Shared decision making for disposition  Patient and/or patient´s representative was counseled regarding labs, imaging, likely diagnosis. All questions were answered. Recommendation was made   for Admission given the need for further escalation of care to inpatient management. Patient agreed and was admitted in stable condition. Admitting team was notified of any pending labs or imaging to ensure continuity of care.     ED Medications administered this visit:    Medications   diazePAM (Valium) injection 10 mg (10 mg intravenous Given 2/2/24 6173)   ondansetron (Zofran) injection  - Omnicell Override Pull (4 mg  Given 2/2/24 4672)         Final Impression:   1. Benzodiazepine withdrawal with perceptual disturbance (CMS/HCC)          I reviewed the case with the attending ED physician. The attending ED physician agrees with the plan.   Ganesh Fall DO  PGY-2  Emergency Medicine      Please excuse any misspellings or unintended errors related to the Dragon speech recognition software used to dictate this note.       Ganesh Fall DO  Resident  02/02/24 8411

## 2024-02-02 NOTE — H&P
"History Of Present Illness  Deyvi Araujo is a 43 y.o. male with medical history significant for polysubstance abuse (EtOH, opioids, tobacco, benzodiazepines), PTSD, hypertension, hyperlipidemia anxiety, depression, panic attacks, and GERD presented to [facility] on 2/2/2024 with complaint of benzodiazepine withdrawal.  The patient states that he would like to get help to quit abusing benzodiazepines and treatment for benzodiazepine withdrawal.  He reports having intermittent chest pain for approximately 2 months but attributes the chest pain to his panic attacks.  He reports that his last dose of Klonopin was Tuesday afternoon.    The patient says that he was sober for approximately 7 to 8 months then his anxiety got so severe that he restarted abusing Klonopin.  So he has been taking 4 to 6 mg/day of Klonopin approximately 5 to 6 months.  He says that he decided to stop the Klonopin cold turkey 3 days ago.  He reports varying headaches, ataxia, tachycardia, insomnia, nausea, vomiting achy throat secondary to vomiting, difficulty concentrating, weight loss, difficulty urinating which he attributes to difficulty concentrating dry heaves, generalized abdominal discomfort, anorexia, anxiety, chest pain, chills, diarrhea, and says he feels hot at times.  He describes the chest pain as being from down to his epigastric region.  In addition he says he feels a pins-and-needles sensation in his fingertips and toes which he says was occurring prior to the beginning of his withdrawal symptoms but says they have become more intense since the withdrawal began and are worse at nighttime.  He also says he has the feeling that he wants to crawl out of his skin at night.  He says the chest pain, \"hold me down to my knees.\"  In addition, he says that his left third fourth and fifth finger is are numb along with the medial aspect of his left arm to his armpit.  In addition, he says, \"My mind is everywhere.  It is like I am " "disconnected from reality everything echoes is in slow motion like it is not real.\"  He describes prior her throat is bright yellow and bile-like.    He says that he tried taking over-the-counter herbal supplements such as valerian root and also tried drinking 2 shots of alcohol approximately 2 nights ago to help with his withdrawal in order to avoid needing to come to the hospital however doing these things did not seem to help to alleviate his symptoms.    Of note, the patient says he went to [facility] emergency department yesterday with a complaint of chest pain and withdrawal symptoms.  He reports that he felt his concerns were not taken seriously.  Is a possible drug seeker.    He denies any medical history of CVA, MI, CAD, CHF, PE, DVT, diabetes, liver disease, pancreatitis, colon issues, cancer, autoimmune disease.  Past Medical History  Past Medical History:   Diagnosis Date    Alcohol abuse     Anxiety and depression     GERD (gastroesophageal reflux disease)     HLD (hyperlipidemia)     HTN (hypertension)     Obesity (BMI 30.0-34.9)     Polysubstance abuse (CMS/HCC)     PTSD (post-traumatic stress disorder)        Surgical History  History reviewed. No pertinent surgical history.  Back surgery age 17 for a football injury  Social History  Social History     Tobacco Use    Smoking status: Every Day     Packs/day: .5     Types: Cigarettes    Smokeless tobacco: Never   Substance Use Topics    Drug use: Not Currently     Types: Benzodiazepines   History of alcohol abuse, however currently he says he no longer drinks daily, last drink was 2 shots 2 nights ago smokes 1 cigar a day for 10 years denies IV drug use benzodiazepine abuse  Opioid abuse sober for 11 years on Suboxone  From home  Says his aunt will stay with him sometimes  He has full custody of his son and will also stay with him at times  He says he works as a   Family History  Family History   Problem Relation Name Age of Onset    " "Alcohol abuse Father          Allergies  Patient has no known allergies.    Review of Systems    Physical Exam    Last Recorded Vitals  Visit Vitals  /79   Pulse 77   Temp 36.4 °C (97.5 °F) (Temporal)   Resp 18   Ht 1.88 m (6' 2\")   Wt 130 kg (286 lb 6 oz)   SpO2 95%   BMI 36.77 kg/m²   Smoking Status Every Day   BSA 2.61 m²        Relevant Results  Results for orders placed or performed during the hospital encounter of 02/02/24 (from the past 24 hour(s))   ECG 12 lead   Result Value Ref Range    Ventricular Rate 93 BPM    Atrial Rate 93 BPM    AZ Interval 148 ms    QRS Duration 86 ms    QT Interval 380 ms    QTC Calculation(Bazett) 472 ms    P Axis 61 degrees    R Axis 57 degrees    T Axis -10 degrees    QRS Count 16 beats    Q Onset 223 ms    P Onset 149 ms    P Offset 205 ms    T Offset 413 ms    QTC Fredericia 440 ms   CBC and Auto Differential   Result Value Ref Range    WBC 6.5 4.4 - 11.3 x10*3/uL    nRBC 0.0 0.0 - 0.0 /100 WBCs    RBC 4.32 (L) 4.50 - 5.90 x10*6/uL    Hemoglobin 14.8 13.5 - 17.5 g/dL    Hematocrit 43.6 41.0 - 52.0 %     (H) 80 - 100 fL    MCH 34.3 (H) 26.0 - 34.0 pg    MCHC 33.9 32.0 - 36.0 g/dL    RDW 13.0 11.5 - 14.5 %    Platelets 203 150 - 450 x10*3/uL    Neutrophils % 57.2 40.0 - 80.0 %    Immature Granulocytes %, Automated 0.3 0.0 - 0.9 %    Lymphocytes % 31.4 13.0 - 44.0 %    Monocytes % 7.9 2.0 - 10.0 %    Eosinophils % 2.3 0.0 - 6.0 %    Basophils % 0.9 0.0 - 2.0 %    Neutrophils Absolute 3.69 1.20 - 7.70 x10*3/uL    Immature Granulocytes Absolute, Automated 0.02 0.00 - 0.70 x10*3/uL    Lymphocytes Absolute 2.03 1.20 - 4.80 x10*3/uL    Monocytes Absolute 0.51 0.10 - 1.00 x10*3/uL    Eosinophils Absolute 0.15 0.00 - 0.70 x10*3/uL    Basophils Absolute 0.06 0.00 - 0.10 x10*3/uL   Comprehensive Metabolic Panel   Result Value Ref Range    Glucose 144 (H) 74 - 99 mg/dL    Sodium 135 (L) 136 - 145 mmol/L    Potassium 3.6 3.5 - 5.3 mmol/L    Chloride 99 98 - 107 mmol/L    " Bicarbonate 28 21 - 32 mmol/L    Anion Gap 12 10 - 20 mmol/L    Urea Nitrogen 8 6 - 23 mg/dL    Creatinine 0.89 0.50 - 1.30 mg/dL    eGFR >90 >60 mL/min/1.73m*2    Calcium 9.5 8.6 - 10.3 mg/dL    Albumin 3.9 3.4 - 5.0 g/dL    Alkaline Phosphatase 72 33 - 120 U/L    Total Protein 7.0 6.4 - 8.2 g/dL    AST 49 (H) 9 - 39 U/L    Bilirubin, Total 0.6 0.0 - 1.2 mg/dL    ALT 93 (H) 10 - 52 U/L   Coagulation Screen   Result Value Ref Range    Protime 12.0 9.8 - 12.8 seconds    INR 1.1 0.9 - 1.1    aPTT 32 27 - 38 seconds   Drug Screen, Urine With Reflex to Confirmation   Result Value Ref Range    Amphetamine Screen, Urine Presumptive Negative Presumptive Negative    Barbiturate Screen, Urine Presumptive Negative Presumptive Negative    Benzodiazepines Screen, Urine Presumptive Positive (A) Presumptive Negative    Cannabinoid Screen, Urine Presumptive Negative Presumptive Negative    Cocaine Metabolite Screen, Urine Presumptive Negative Presumptive Negative    Fentanyl Screen, Urine Presumptive Negative Presumptive Negative    Opiate Screen, Urine Presumptive Negative Presumptive Negative    Oxycodone Screen, Urine Presumptive Negative Presumptive Negative    PCP Screen, Urine Presumptive Negative Presumptive Negative   PST Top   Result Value Ref Range    Extra Tube Hold for add-ons.    Acute Toxicology Panel, Blood   Result Value Ref Range    Acetaminophen <10.0 10.0 - 30.0 ug/mL    Salicylate  <3 4 - 20 mg/dL    Alcohol <10 <=10 mg/dL   Sars-CoV-2 and Influenza A/B PCR   Result Value Ref Range    Flu A Result Not Detected Not Detected    Flu B Result Not Detected Not Detected    Coronavirus 2019, PCR Not Detected Not Detected   Sterile Cup   Result Value Ref Range    Extra Tube Hold for add-ons.    Renal function panel   Result Value Ref Range    Glucose 170 (H) 74 - 99 mg/dL    Sodium 135 (L) 136 - 145 mmol/L    Potassium 3.4 (L) 3.5 - 5.3 mmol/L    Chloride 101 98 - 107 mmol/L    Bicarbonate 27 21 - 32 mmol/L    Anion  Gap 10 10 - 20 mmol/L    Urea Nitrogen 10 6 - 23 mg/dL    Creatinine 1.03 0.50 - 1.30 mg/dL    eGFR >90 >60 mL/min/1.73m*2    Calcium 9.2 8.6 - 10.3 mg/dL    Phosphorus 2.6 2.5 - 4.9 mg/dL    Albumin 3.5 3.4 - 5.0 g/dL   Magnesium   Result Value Ref Range    Magnesium 1.87 1.60 - 2.40 mg/dL   Magnesium   Result Value Ref Range    Magnesium 2.17 1.60 - 2.40 mg/dL   Hepatic function panel   Result Value Ref Range    Albumin 3.2 (L) 3.4 - 5.0 g/dL    Bilirubin, Total 0.4 0.0 - 1.2 mg/dL    Bilirubin, Direct 0.1 0.0 - 0.3 mg/dL    Alkaline Phosphatase 62 33 - 120 U/L    ALT 70 (H) 10 - 52 U/L    AST 38 9 - 39 U/L    Total Protein 6.3 (L) 6.4 - 8.2 g/dL   CBC   Result Value Ref Range    WBC 5.1 4.4 - 11.3 x10*3/uL    nRBC 0.0 0.0 - 0.0 /100 WBCs    RBC 3.83 (L) 4.50 - 5.90 x10*6/uL    Hemoglobin 13.4 (L) 13.5 - 17.5 g/dL    Hematocrit 39.2 (L) 41.0 - 52.0 %     (H) 80 - 100 fL    MCH 35.0 (H) 26.0 - 34.0 pg    MCHC 34.2 32.0 - 36.0 g/dL    RDW 13.0 11.5 - 14.5 %    Platelets 172 150 - 450 x10*3/uL   Phosphorus   Result Value Ref Range    Phosphorus 3.7 2.5 - 4.9 mg/dL   Basic Metabolic Panel   Result Value Ref Range    Glucose 123 (H) 74 - 99 mg/dL    Sodium 136 136 - 145 mmol/L    Potassium 3.6 3.5 - 5.3 mmol/L    Chloride 102 98 - 107 mmol/L    Bicarbonate 29 21 - 32 mmol/L    Anion Gap 9 (L) 10 - 20 mmol/L    Urea Nitrogen 11 6 - 23 mg/dL    Creatinine 0.98 0.50 - 1.30 mg/dL    eGFR >90 >60 mL/min/1.73m*2    Calcium 9.1 8.6 - 10.3 mg/dL      Imaging:  XR chest 1 view    (Results Pending)     EKG:  Encounter Date: 02/02/24   ECG 12 lead   Result Value    Ventricular Rate 93    Atrial Rate 93    NV Interval 148    QRS Duration 86    QT Interval 380    QTC Calculation(Bazett) 472    P Axis 61    R Axis 57    T Axis -10    QRS Count 16    Q Onset 223    P Onset 149    P Offset 205    T Offset 413    QTC Fredericia 440    Narrative    Normal sinus rhythm  Septal infarct , age undetermined  T wave abnormality,  consider inferior ischemia  Abnormal ECG  When compared with ECG of 29-JAN-2024 10:29,  Septal infarct is now Present     Echo:  No results found for this or any previous visit.    Home Medications  Prior to Admission medications    Medication Sig Start Date End Date Taking? Authorizing Provider   amLODIPine (Norvasc) 10 mg tablet Take 1 tablet (10 mg) by mouth once daily for 14 days.  Patient not taking: Reported on 2/2/2024 1/7/24 1/21/24  MAILE Mario   buprenorphine-naloxone (Suboxone) 8-2 mg SL tablet Place 2 tablets under the tongue once daily.    Historical Provider, MD   folic acid (Folvite) 1 mg tablet Take 1 tablet (1 mg) by mouth once daily. Do not start before January 8, 2024.  Patient not taking: Reported on 2/2/2024 1/8/24 2/7/24  MAILE Mario   gabapentin (Neurontin) 600 mg tablet Take 1 tablet (600 mg) by mouth 3 times a day for 14 days.  Patient not taking: Reported on 2/2/2024 1/7/24 1/21/24  MAILE Mario   gabapentin (Neurontin) 600 mg tablet Take 1 tablet (600 mg) by mouth 3 times a day.    Historical Provider, MD   hydrOXYzine HCL (Atarax) 25 mg tablet Take 1 tablet (25 mg) by mouth 3 times a day for 14 days.  Patient not taking: Reported on 2/2/2024 1/7/24 1/21/24  MAILE Mario   multivitamin with minerals tablet Take 1 tablet by mouth once daily. Do not start before January 8, 2024.  Patient not taking: Reported on 2/2/2024 1/8/24 2/7/24  MALIE Mario   nicotine (Nicoderm CQ) 14 mg/24 hr patch Place 1 patch over 24 hours on the skin once daily for 28 doses. Do not start before January 8, 2024.  Patient not taking: Reported on 2/2/2024 1/8/24 2/5/24  MAILE Mario   omeprazole (PriLOSEC) 20 mg DR capsule Take 1 capsule (20 mg) by mouth once daily in the morning. Take before meals for 14 days.  Patient not taking: Reported on 2/2/2024 1/7/24 1/21/24  Rhonda López, DIMITRI-CNP    QUEtiapine (SEROquel) 25 mg tablet Take 1 tablet (25 mg) by mouth once daily at bedtime.  Patient not taking: Reported on 2/2/2024 1/7/24 2/6/24  MAILE Mario   thiamine (Vitamin B-1) 100 mg tablet Take 1 tablet (100 mg) by mouth once daily. Do not start before January 8, 2024.  Patient not taking: Reported on 2/2/2024 1/8/24 2/7/24  MAILE Mario   venlafaxine XR (Effexor-XR) 150 mg 24 hr capsule Take 1 capsule (150 mg) by mouth once daily for 14 days.  Patient not taking: Reported on 2/2/2024 1/7/24 1/21/24  MAILE Mario   venlafaxine XR (Effexor-XR) 150 mg 24 hr capsule Take 1 capsule (150 mg) by mouth once daily.    Historical Provider, MD       Medications  Scheduled medications  buprenorphine-naloxone, 2 tablet, sublingual, Daily  enoxaparin, 40 mg, subcutaneous, q24h  folic acid, 1 mg, oral, Daily  gabapentin, 600 mg, oral, TID  multivitamin with minerals, 1 tablet, oral, Daily  nicotine, 1 patch, transdermal, Daily   Followed by  [START ON 3/15/2024] nicotine, 1 patch, transdermal, Daily  pantoprazole, 40 mg, oral, Daily before breakfast   Or  pantoprazole, 40 mg, intravenous, Daily before breakfast  sennosides-docusate sodium, 2 tablet, oral, BID  thiamine, 100 mg, oral, Daily  venlafaxine XR, 150 mg, oral, Daily      Continuous medications     PRN medications  acetaminophen, 650 mg, q4h PRN   Or  acetaminophen, 650 mg, q4h PRN   Or  acetaminophen, 650 mg, q4h PRN  bisacodyl, 10 mg, Daily PRN  diazePAM, 10 mg, q2h PRN  ondansetron ODT, 4 mg, q8h PRN   Or  ondansetron, 4 mg, q8h PRN        Assessment/Plan   Principal Problem:    Benzodiazepine withdrawal with perceptual disturbance (CMS/HCC)  Benzodiazepine Withdrawal   Polysubstance abuse  Hypokalemia  Chest pain with history of panic attacks  GERD  Elevated ALT      Plan:  Psych consult   Social work consult   CIWA   Per [facility]LESR[facility], suboxone has not been being prescribed thus was not ordered; defer to  psych  IV valium protocol started in the ED  Morning labs    VTE prophylaxis:   DVT prophylaxis: subcutaneous Lovenox and SCDs    Medication reconciliation to be completed when home medications are verified by pharmacy.   See additional orders for further plan of care.   Further evaluation and management per attending and consulting physicians.      Code Status  Full code    I spent 60 minutes in the professional and overall care of this patient.      Norman Soto PA-C  [facility]  Pager [phone]               *Please note this report has been produced using speech recognition software and may contain errors related to that system including grammar, punctuation and spelling as well as words and phrases that may be inappropriate. If there are questions or concerns, please feel free to contact me to clarify.

## 2024-02-03 LAB
ALBUMIN SERPL BCP-MCNC: 3.2 G/DL (ref 3.4–5)
ALP SERPL-CCNC: 62 U/L (ref 33–120)
ALT SERPL W P-5'-P-CCNC: 70 U/L (ref 10–52)
ANION GAP SERPL CALC-SCNC: 9 MMOL/L (ref 10–20)
AST SERPL W P-5'-P-CCNC: 38 U/L (ref 9–39)
ATRIAL RATE: 93 BPM
BILIRUB DIRECT SERPL-MCNC: 0.1 MG/DL (ref 0–0.3)
BILIRUB SERPL-MCNC: 0.4 MG/DL (ref 0–1.2)
BUN SERPL-MCNC: 11 MG/DL (ref 6–23)
CALCIUM SERPL-MCNC: 9.1 MG/DL (ref 8.6–10.3)
CARDIAC TROPONIN I PNL SERPL HS: 4 NG/L (ref 0–20)
CHLORIDE SERPL-SCNC: 102 MMOL/L (ref 98–107)
CO2 SERPL-SCNC: 29 MMOL/L (ref 21–32)
CREAT SERPL-MCNC: 0.98 MG/DL (ref 0.5–1.3)
EGFRCR SERPLBLD CKD-EPI 2021: >90 ML/MIN/1.73M*2
ERYTHROCYTE [DISTWIDTH] IN BLOOD BY AUTOMATED COUNT: 13 % (ref 11.5–14.5)
GLUCOSE SERPL-MCNC: 123 MG/DL (ref 74–99)
HCT VFR BLD AUTO: 39.2 % (ref 41–52)
HGB BLD-MCNC: 13.4 G/DL (ref 13.5–17.5)
MAGNESIUM SERPL-MCNC: 2.17 MG/DL (ref 1.6–2.4)
MCH RBC QN AUTO: 35 PG (ref 26–34)
MCHC RBC AUTO-ENTMCNC: 34.2 G/DL (ref 32–36)
MCV RBC AUTO: 102 FL (ref 80–100)
NRBC BLD-RTO: 0 /100 WBCS (ref 0–0)
P AXIS: 61 DEGREES
P OFFSET: 205 MS
P ONSET: 149 MS
PHOSPHATE SERPL-MCNC: 3.7 MG/DL (ref 2.5–4.9)
PLATELET # BLD AUTO: 172 X10*3/UL (ref 150–450)
POTASSIUM SERPL-SCNC: 3.6 MMOL/L (ref 3.5–5.3)
PR INTERVAL: 148 MS
PROT SERPL-MCNC: 6.3 G/DL (ref 6.4–8.2)
Q ONSET: 223 MS
QRS COUNT: 16 BEATS
QRS DURATION: 86 MS
QT INTERVAL: 380 MS
QTC CALCULATION(BAZETT): 472 MS
QTC FREDERICIA: 440 MS
R AXIS: 57 DEGREES
RBC # BLD AUTO: 3.83 X10*6/UL (ref 4.5–5.9)
SODIUM SERPL-SCNC: 136 MMOL/L (ref 136–145)
T AXIS: -10 DEGREES
T OFFSET: 413 MS
VENTRICULAR RATE: 93 BPM
WBC # BLD AUTO: 5.1 X10*3/UL (ref 4.4–11.3)

## 2024-02-03 PROCEDURE — 36415 COLL VENOUS BLD VENIPUNCTURE: CPT | Performed by: PHYSICIAN ASSISTANT

## 2024-02-03 PROCEDURE — 2500000004 HC RX 250 GENERAL PHARMACY W/ HCPCS (ALT 636 FOR OP/ED): Performed by: PHYSICIAN ASSISTANT

## 2024-02-03 PROCEDURE — 99223 1ST HOSP IP/OBS HIGH 75: CPT | Performed by: PSYCHIATRY & NEUROLOGY

## 2024-02-03 PROCEDURE — 2500000004 HC RX 250 GENERAL PHARMACY W/ HCPCS (ALT 636 FOR OP/ED): Performed by: STUDENT IN AN ORGANIZED HEALTH CARE EDUCATION/TRAINING PROGRAM

## 2024-02-03 PROCEDURE — 84484 ASSAY OF TROPONIN QUANT: CPT | Performed by: PHYSICIAN ASSISTANT

## 2024-02-03 PROCEDURE — S4991 NICOTINE PATCH NONLEGEND: HCPCS | Performed by: PHYSICIAN ASSISTANT

## 2024-02-03 PROCEDURE — 2500000002 HC RX 250 W HCPCS SELF ADMINISTERED DRUGS (ALT 637 FOR MEDICARE OP, ALT 636 FOR OP/ED): Performed by: PHYSICIAN ASSISTANT

## 2024-02-03 PROCEDURE — 2500000001 HC RX 250 WO HCPCS SELF ADMINISTERED DRUGS (ALT 637 FOR MEDICARE OP): Performed by: PSYCHIATRY & NEUROLOGY

## 2024-02-03 PROCEDURE — 2500000002 HC RX 250 W HCPCS SELF ADMINISTERED DRUGS (ALT 637 FOR MEDICARE OP, ALT 636 FOR OP/ED): Performed by: NURSE PRACTITIONER

## 2024-02-03 PROCEDURE — 84100 ASSAY OF PHOSPHORUS: CPT | Performed by: PHYSICIAN ASSISTANT

## 2024-02-03 PROCEDURE — 2500000001 HC RX 250 WO HCPCS SELF ADMINISTERED DRUGS (ALT 637 FOR MEDICARE OP): Performed by: PHYSICIAN ASSISTANT

## 2024-02-03 PROCEDURE — 2500000005 HC RX 250 GENERAL PHARMACY W/O HCPCS: Performed by: PHYSICIAN ASSISTANT

## 2024-02-03 PROCEDURE — 83735 ASSAY OF MAGNESIUM: CPT | Performed by: PHYSICIAN ASSISTANT

## 2024-02-03 PROCEDURE — 80048 BASIC METABOLIC PNL TOTAL CA: CPT | Performed by: PHYSICIAN ASSISTANT

## 2024-02-03 PROCEDURE — 85027 COMPLETE CBC AUTOMATED: CPT | Performed by: PHYSICIAN ASSISTANT

## 2024-02-03 PROCEDURE — 1200000002 HC GENERAL ROOM WITH TELEMETRY DAILY

## 2024-02-03 PROCEDURE — 82248 BILIRUBIN DIRECT: CPT | Performed by: PHYSICIAN ASSISTANT

## 2024-02-03 RX ORDER — CHLORDIAZEPOXIDE HYDROCHLORIDE 25 MG/1
25 CAPSULE, GELATIN COATED ORAL EVERY 6 HOURS
Status: COMPLETED | OUTPATIENT
Start: 2024-02-03 | End: 2024-02-04

## 2024-02-03 RX ORDER — LORAZEPAM 1 MG/1
1 TABLET ORAL EVERY 2 HOUR PRN
Status: DISCONTINUED | OUTPATIENT
Start: 2024-02-03 | End: 2024-02-07

## 2024-02-03 RX ORDER — BUPRENORPHINE HYDROCHLORIDE AND NALOXONE HYDROCHLORIDE DIHYDRATE 8; 2 MG/1; MG/1
2 TABLET SUBLINGUAL DAILY
Status: DISCONTINUED | OUTPATIENT
Start: 2024-02-03 | End: 2024-02-07 | Stop reason: HOSPADM

## 2024-02-03 RX ORDER — VENLAFAXINE HYDROCHLORIDE 75 MG/1
150 CAPSULE, EXTENDED RELEASE ORAL DAILY
Status: DISCONTINUED | OUTPATIENT
Start: 2024-02-03 | End: 2024-02-07 | Stop reason: HOSPADM

## 2024-02-03 RX ORDER — CHLORDIAZEPOXIDE HYDROCHLORIDE 25 MG/1
25 CAPSULE, GELATIN COATED ORAL EVERY 12 HOURS
Status: DISCONTINUED | OUTPATIENT
Start: 2024-02-07 | End: 2024-02-07 | Stop reason: HOSPADM

## 2024-02-03 RX ORDER — GABAPENTIN 600 MG/1
600 TABLET ORAL 3 TIMES DAILY
Status: DISCONTINUED | OUTPATIENT
Start: 2024-02-03 | End: 2024-02-07 | Stop reason: HOSPADM

## 2024-02-03 RX ORDER — CHLORDIAZEPOXIDE HYDROCHLORIDE 25 MG/1
25 CAPSULE, GELATIN COATED ORAL EVERY 8 HOURS
Status: COMPLETED | OUTPATIENT
Start: 2024-02-05 | End: 2024-02-06

## 2024-02-03 RX ORDER — LORAZEPAM 1 MG/1
2 TABLET ORAL EVERY 2 HOUR PRN
Status: DISCONTINUED | OUTPATIENT
Start: 2024-02-03 | End: 2024-02-07

## 2024-02-03 RX ORDER — LORAZEPAM 0.5 MG/1
0.5 TABLET ORAL EVERY 2 HOUR PRN
Status: DISCONTINUED | OUTPATIENT
Start: 2024-02-03 | End: 2024-02-07

## 2024-02-03 RX ADMIN — ENOXAPARIN SODIUM 40 MG: 40 INJECTION SUBCUTANEOUS at 20:30

## 2024-02-03 RX ADMIN — DIVALPROEX SODIUM 750 MG: 500 TABLET, FILM COATED, EXTENDED RELEASE ORAL at 13:51

## 2024-02-03 RX ADMIN — PANTOPRAZOLE SODIUM 40 MG: 40 TABLET, DELAYED RELEASE ORAL at 08:52

## 2024-02-03 RX ADMIN — DIAZEPAM 10 MG: 10 INJECTION, SOLUTION INTRAMUSCULAR; INTRAVENOUS at 04:46

## 2024-02-03 RX ADMIN — CHLORDIAZEPOXIDE HYDROCHLORIDE 25 MG: 25 CAPSULE ORAL at 18:54

## 2024-02-03 RX ADMIN — GABAPENTIN 600 MG: 600 TABLET, FILM COATED ORAL at 20:31

## 2024-02-03 RX ADMIN — Medication 1 TABLET: at 08:51

## 2024-02-03 RX ADMIN — GABAPENTIN 600 MG: 600 TABLET, FILM COATED ORAL at 15:33

## 2024-02-03 RX ADMIN — DIAZEPAM 10 MG: 10 INJECTION, SOLUTION INTRAMUSCULAR; INTRAVENOUS at 00:38

## 2024-02-03 RX ADMIN — LORAZEPAM 0.5 MG: 0.5 TABLET ORAL at 20:32

## 2024-02-03 RX ADMIN — ACETAMINOPHEN 650 MG: 325 TABLET ORAL at 04:46

## 2024-02-03 RX ADMIN — NICOTINE 1 PATCH: 14 PATCH, EXTENDED RELEASE TRANSDERMAL at 08:52

## 2024-02-03 RX ADMIN — BUPRENORPHINE AND NALOXONE 2 TABLET: 8; 2 TABLET SUBLINGUAL at 09:27

## 2024-02-03 RX ADMIN — GABAPENTIN 600 MG: 600 TABLET, FILM COATED ORAL at 08:52

## 2024-02-03 RX ADMIN — ONDANSETRON 4 MG: 4 TABLET, ORALLY DISINTEGRATING ORAL at 18:24

## 2024-02-03 RX ADMIN — CHLORDIAZEPOXIDE HYDROCHLORIDE 25 MG: 25 CAPSULE ORAL at 12:44

## 2024-02-03 RX ADMIN — FOLIC ACID 1 MG: 1 TABLET ORAL at 08:52

## 2024-02-03 RX ADMIN — THIAMINE HCL TAB 100 MG 100 MG: 100 TAB at 08:52

## 2024-02-03 RX ADMIN — LORAZEPAM 1 MG: 1 TABLET ORAL at 22:34

## 2024-02-03 RX ADMIN — PANTOPRAZOLE SODIUM 40 MG: 40 TABLET, DELAYED RELEASE ORAL at 08:03

## 2024-02-03 RX ADMIN — LORAZEPAM 0.5 MG: 0.5 TABLET ORAL at 16:23

## 2024-02-03 RX ADMIN — DIAZEPAM 10 MG: 10 INJECTION, SOLUTION INTRAMUSCULAR; INTRAVENOUS at 08:53

## 2024-02-03 RX ADMIN — LORAZEPAM 2 MG: 1 TABLET ORAL at 14:04

## 2024-02-03 RX ADMIN — VENLAFAXINE HYDROCHLORIDE 150 MG: 75 CAPSULE, EXTENDED RELEASE ORAL at 08:52

## 2024-02-03 SDOH — SOCIAL STABILITY: SOCIAL INSECURITY: HAVE YOU HAD THOUGHTS OF HARMING ANYONE ELSE?: NO

## 2024-02-03 SDOH — SOCIAL STABILITY: SOCIAL INSECURITY: DO YOU FEEL UNSAFE GOING BACK TO THE PLACE WHERE YOU ARE LIVING?: NO

## 2024-02-03 SDOH — SOCIAL STABILITY: SOCIAL INSECURITY: DO YOU FEEL ANYONE HAS EXPLOITED OR TAKEN ADVANTAGE OF YOU FINANCIALLY OR OF YOUR PERSONAL PROPERTY?: NO

## 2024-02-03 SDOH — SOCIAL STABILITY: SOCIAL INSECURITY: ARE YOU OR HAVE YOU BEEN THREATENED OR ABUSED PHYSICALLY, EMOTIONALLY, OR SEXUALLY BY ANYONE?: NO

## 2024-02-03 SDOH — SOCIAL STABILITY: SOCIAL INSECURITY: DOES ANYONE TRY TO KEEP YOU FROM HAVING/CONTACTING OTHER FRIENDS OR DOING THINGS OUTSIDE YOUR HOME?: NO

## 2024-02-03 SDOH — SOCIAL STABILITY: SOCIAL INSECURITY: ABUSE: ADULT

## 2024-02-03 SDOH — SOCIAL STABILITY: SOCIAL INSECURITY: HAS ANYONE EVER THREATENED TO HURT YOUR FAMILY OR YOUR PETS?: NO

## 2024-02-03 SDOH — SOCIAL STABILITY: SOCIAL INSECURITY: ARE THERE ANY APPARENT SIGNS OF INJURIES/BEHAVIORS THAT COULD BE RELATED TO ABUSE/NEGLECT?: NO

## 2024-02-03 ASSESSMENT — LIFESTYLE VARIABLES
PULSE: 73
HEADACHE, FULLNESS IN HEAD: MODERATE
PAROXYSMAL SWEATS: 2
HEADACHE, FULLNESS IN HEAD: VERY MILD
PAROXYSMAL SWEATS: BARELY PERCEPTIBLE SWEATING, PALMS MOIST
TREMOR: NOT VISIBLE, BUT CAN BE FELT FINGERTIP TO FINGERTIP
BLOOD PRESSURE: 138/92
PULSE: 73
AUDITORY DISTURBANCES: NOT PRESENT
BLOOD PRESSURE: 130/85
HOW OFTEN DO YOU HAVE 6 OR MORE DRINKS ON ONE OCCASION: DAILY OR ALMOST DAILY
NAUSEA AND VOMITING: MILD NAUSEA WITH NO VOMITING
TACTILE DISTURBANCES: VERY MILD ITCHING, PINS AND NEEDLES, BURNING OR NUMBNESS
HEADACHE, FULLNESS IN HEAD: MODERATE
TOTAL SCORE: 7
TREMOR: NOT VISIBLE, BUT CAN BE FELT FINGERTIP TO FINGERTIP
TACTILE DISTURBANCES: VERY MILD ITCHING, PINS AND NEEDLES, BURNING OR NUMBNESS
TOTAL SCORE: 10
AGITATION: NORMAL ACTIVITY
TACTILE DISTURBANCES: VERY MILD ITCHING, PINS AND NEEDLES, BURNING OR NUMBNESS
PAROXYSMAL SWEATS: NO SWEAT VISIBLE
HEADACHE, FULLNESS IN HEAD: VERY MILD
PAROXYSMAL SWEATS: 3
NAUSEA AND VOMITING: NO NAUSEA AND NO VOMITING
PAROXYSMAL SWEATS: 2
HEADACHE, FULLNESS IN HEAD: VERY MILD
NAUSEA AND VOMITING: NO NAUSEA AND NO VOMITING
TREMOR: 2
AUDITORY DISTURBANCES: NOT PRESENT
ORIENTATION AND CLOUDING OF SENSORIUM: ORIENTED AND CAN DO SERIAL ADDITIONS
VISUAL DISTURBANCES: NOT PRESENT
VISUAL DISTURBANCES: NOT PRESENT
VISUAL DISTURBANCES: VERY MILD SENSITIVITY
NAUSEA AND VOMITING: NO NAUSEA AND NO VOMITING
TREMOR: NOT VISIBLE, BUT CAN BE FELT FINGERTIP TO FINGERTIP
NAUSEA AND VOMITING: NO NAUSEA AND NO VOMITING
BLOOD PRESSURE: 130/103
VISUAL DISTURBANCES: NOT PRESENT
TOTAL SCORE: 6
AGITATION: NORMAL ACTIVITY
VISUAL DISTURBANCES: NOT PRESENT
PAROXYSMAL SWEATS: NO SWEAT VISIBLE
PAROXYSMAL SWEATS: BARELY PERCEPTIBLE SWEATING, PALMS MOIST
ANXIETY: NO ANXIETY, AT EASE
VISUAL DISTURBANCES: NOT PRESENT
AGITATION: SOMEWHAT MORE THAN NORMAL ACTIVITY
SKIP TO QUESTIONS 9-10: 0
PULSE: 77
ANXIETY: NO ANXIETY, AT EASE
ORIENTATION AND CLOUDING OF SENSORIUM: ORIENTED AND CAN DO SERIAL ADDITIONS
ORIENTATION AND CLOUDING OF SENSORIUM: ORIENTED AND CAN DO SERIAL ADDITIONS
NAUSEA AND VOMITING: NO NAUSEA AND NO VOMITING
ANXIETY: MILDLY ANXIOUS
TOTAL SCORE: 6
AUDITORY DISTURBANCES: NOT PRESENT
VISUAL DISTURBANCES: NOT PRESENT
VISUAL DISTURBANCES: NOT PRESENT
TOTAL SCORE: 7
NAUSEA AND VOMITING: NO NAUSEA AND NO VOMITING
AUDIT-C TOTAL SCORE: 11
NAUSEA AND VOMITING: MILD NAUSEA WITH NO VOMITING
ANXIETY: MILDLY ANXIOUS
TOTAL SCORE: 8
TOTAL SCORE: 8
AUDITORY DISTURBANCES: NOT PRESENT
TREMOR: NOT VISIBLE, BUT CAN BE FELT FINGERTIP TO FINGERTIP
ORIENTATION AND CLOUDING OF SENSORIUM: CANNOT DO SERIAL ADDITIONS OR IS UNCERTAIN ABOUT DATE
TOTAL SCORE: 5
VISUAL DISTURBANCES: NOT PRESENT
HEADACHE, FULLNESS IN HEAD: VERY MILD
PULSE: 64
AUDITORY DISTURBANCES: NOT PRESENT
ANXIETY: 2
ORIENTATION AND CLOUDING OF SENSORIUM: ORIENTED AND CAN DO SERIAL ADDITIONS
AGITATION: SOMEWHAT MORE THAN NORMAL ACTIVITY
ORIENTATION AND CLOUDING OF SENSORIUM: ORIENTED AND CAN DO SERIAL ADDITIONS
PAROXYSMAL SWEATS: 2
AUDITORY DISTURBANCES: VERY MILD HARSHNESS OR ABILITY TO FRIGHTEN
TACTILE DISTURBANCES: VERY MILD ITCHING, PINS AND NEEDLES, BURNING OR NUMBNESS
AUDITORY DISTURBANCES: NOT PRESENT
HEADACHE, FULLNESS IN HEAD: VERY MILD
VISUAL DISTURBANCES: NOT PRESENT
ORIENTATION AND CLOUDING OF SENSORIUM: ORIENTED AND CAN DO SERIAL ADDITIONS
TREMOR: NOT VISIBLE, BUT CAN BE FELT FINGERTIP TO FINGERTIP
TACTILE DISTURBANCES: VERY MILD ITCHING, PINS AND NEEDLES, BURNING OR NUMBNESS
VISUAL DISTURBANCES: NOT PRESENT
AUDITORY DISTURBANCES: NOT PRESENT
AGITATION: NORMAL ACTIVITY
TREMOR: NOT VISIBLE, BUT CAN BE FELT FINGERTIP TO FINGERTIP
TREMOR: NOT VISIBLE, BUT CAN BE FELT FINGERTIP TO FINGERTIP
BLOOD PRESSURE: 109/79
ANXIETY: MILDLY ANXIOUS
AUDITORY DISTURBANCES: NOT PRESENT
TACTILE DISTURBANCES: VERY MILD ITCHING, PINS AND NEEDLES, BURNING OR NUMBNESS
PAROXYSMAL SWEATS: BARELY PERCEPTIBLE SWEATING, PALMS MOIST
TACTILE DISTURBANCES: VERY MILD ITCHING, PINS AND NEEDLES, BURNING OR NUMBNESS
AGITATION: NORMAL ACTIVITY
AUDIT-C TOTAL SCORE: 11
TOTAL SCORE: 4
NAUSEA AND VOMITING: NO NAUSEA AND NO VOMITING
NAUSEA AND VOMITING: NO NAUSEA AND NO VOMITING
TREMOR: NOT VISIBLE, BUT CAN BE FELT FINGERTIP TO FINGERTIP
PAROXYSMAL SWEATS: 2
NAUSEA AND VOMITING: NO NAUSEA AND NO VOMITING
ORIENTATION AND CLOUDING OF SENSORIUM: ORIENTED AND CAN DO SERIAL ADDITIONS
BLOOD PRESSURE: 115/76
ANXIETY: 3
HOW MANY STANDARD DRINKS CONTAINING ALCOHOL DO YOU HAVE ON A TYPICAL DAY: 7 TO 9
AUDITORY DISTURBANCES: NOT PRESENT
PULSE: 88
ORIENTATION AND CLOUDING OF SENSORIUM: ORIENTED AND CAN DO SERIAL ADDITIONS
ANXIETY: 2
TREMOR: NOT VISIBLE, BUT CAN BE FELT FINGERTIP TO FINGERTIP
TOTAL SCORE: 4
HEADACHE, FULLNESS IN HEAD: VERY MILD
ORIENTATION AND CLOUDING OF SENSORIUM: ORIENTED AND CAN DO SERIAL ADDITIONS
HEADACHE, FULLNESS IN HEAD: VERY MILD
VISUAL DISTURBANCES: NOT PRESENT
AGITATION: NORMAL ACTIVITY
ORIENTATION AND CLOUDING OF SENSORIUM: ORIENTED AND CAN DO SERIAL ADDITIONS
ANXIETY: MILDLY ANXIOUS
NAUSEA AND VOMITING: MILD NAUSEA WITH NO VOMITING
AGITATION: NORMAL ACTIVITY
AUDITORY DISTURBANCES: NOT PRESENT
AGITATION: SOMEWHAT MORE THAN NORMAL ACTIVITY
TREMOR: NOT VISIBLE, BUT CAN BE FELT FINGERTIP TO FINGERTIP
HEADACHE, FULLNESS IN HEAD: NOT PRESENT
ANXIETY: 2
PAROXYSMAL SWEATS: BARELY PERCEPTIBLE SWEATING, PALMS MOIST
AGITATION: NORMAL ACTIVITY
AGITATION: NORMAL ACTIVITY
ORIENTATION AND CLOUDING OF SENSORIUM: ORIENTED AND CAN DO SERIAL ADDITIONS
TACTILE DISTURBANCES: VERY MILD ITCHING, PINS AND NEEDLES, BURNING OR NUMBNESS
TOTAL SCORE: 10
TOTAL SCORE: 5
PAROXYSMAL SWEATS: BARELY PERCEPTIBLE SWEATING, PALMS MOIST
HEADACHE, FULLNESS IN HEAD: VERY MILD
TACTILE DISTURBANCES: VERY MILD ITCHING, PINS AND NEEDLES, BURNING OR NUMBNESS
ANXIETY: MILDLY ANXIOUS
AGITATION: SOMEWHAT MORE THAN NORMAL ACTIVITY
ANXIETY: 2
TREMOR: NOT VISIBLE, BUT CAN BE FELT FINGERTIP TO FINGERTIP
HEADACHE, FULLNESS IN HEAD: MILD
AUDITORY DISTURBANCES: NOT PRESENT
HOW OFTEN DO YOU HAVE A DRINK CONTAINING ALCOHOL: 4 OR MORE TIMES A WEEK

## 2024-02-03 ASSESSMENT — PAIN DESCRIPTION - LOCATION: LOCATION: HEAD

## 2024-02-03 ASSESSMENT — PATIENT HEALTH QUESTIONNAIRE - PHQ9
2. FEELING DOWN, DEPRESSED OR HOPELESS: MORE THAN HALF THE DAYS
SUM OF ALL RESPONSES TO PHQ9 QUESTIONS 1 & 2: 4
1. LITTLE INTEREST OR PLEASURE IN DOING THINGS: MORE THAN HALF THE DAYS

## 2024-02-03 ASSESSMENT — COGNITIVE AND FUNCTIONAL STATUS - GENERAL
MOBILITY SCORE: 24
MOBILITY SCORE: 24
DAILY ACTIVITIY SCORE: 24

## 2024-02-03 ASSESSMENT — PAIN SCALES - GENERAL
PAINLEVEL_OUTOF10: 3
PAINLEVEL_OUTOF10: 5 - MODERATE PAIN
PAINLEVEL_OUTOF10: 0 - NO PAIN
PAINLEVEL_OUTOF10: 0 - NO PAIN
PAINLEVEL_OUTOF10: 3
PAINLEVEL_OUTOF10: 7
PAINLEVEL_OUTOF10: 3
PAINLEVEL_OUTOF10: 7
PAINLEVEL_OUTOF10: 7

## 2024-02-03 ASSESSMENT — PAIN - FUNCTIONAL ASSESSMENT: PAIN_FUNCTIONAL_ASSESSMENT: 0-10

## 2024-02-03 NOTE — CARE PLAN
The patient's goals for the shift include  Patient will tolerate withdrawal symptoms this shift    The clinical goals for the shift include Patient will use call light for assistance

## 2024-02-03 NOTE — NURSING NOTE
1230 -  Requested assistance from charge nurse to help with this pt. He is agitated and anxious. Pt confused on orders and how CIWA scoring goes. Psych doctor at bedside and voiced wanting to change meds for the CIWA protocol. New orders are pending.     1300 - Msg sent to [facility] and psych doctor to clarify orders. Awaiting meds.     1730 - Pt has calmed down and is cooperative with charge nurse for CIWA scoring. He has been medicated per orders. Pt with call light in reach and able to voice needs. No seizure activity witnessed this shift and safety maintained.

## 2024-02-03 NOTE — CONSULTS
Reason for consult:  Benzo withdrawal    History Of Present Illness  Deyvi Araujo is a 43 y.o. male presenting with Benzo withdrawal.  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  Patient has been abusing Klonopin 4 to 6 mg/day.  If he does not get Klonopin he takes Xanax from the street.  Patient has long history of benzo addiction with rehabilitation about 11 years ago when he stayed sober for few months after discharge.  Since then patient has been using on and off.  Recently he has been continuously using benzos.  Patient was getting prescription up until 2 months ago about 2 mg/day of Klonopin but since he did not find any prescriber he started abusing it from the street.  Patient reports that he has marked anxiety and PTSD symptoms as the reason for using the Klonopin and currently he is addicted to it.  Patient reported feeling depressed secondary to his current situation.  He denies any hopeless helpless or worthless feeling.  He denies any suicidal thoughts.  Patient has history of withdrawal seizures in the past.    Patient also has history of opiate use and he has been sober for 11 years since he started taking Suboxone.    Patient is interested to going to residential rehab when he is medically stable.    Patient denies any past suicidal attempt or have access to guns or weapons.  He has never been admitted to psychiatric hospital in the past.  Patient is currently on Effexor 150 mg for the anxiety.     Past Medical History  He has a past medical history of Alcohol abuse, Anxiety and depression, GERD (gastroesophageal reflux disease), HLD (hyperlipidemia), HTN (hypertension), Obesity (BMI 30.0-34.9), Polysubstance abuse (CMS/[facility]), and PTSD (post-traumatic stress disorder).    Surgical History  He has no past surgical history on file.     Social History  He reports that he has been smoking cigarettes. He has been smoking an average of .5 packs per day. He has never used smokeless tobacco.  "He reports that he does not currently use drugs after having used the following drugs: Benzodiazepines. No history on file for alcohol use.     Allergies  Patient has no known allergies.    Review of Systems    Psychiatric ROS - Adult  Anxiety: General Anxiety Disorder (THERESA)THERESA Behaviors: difficult to control worry  Depression: negative  Delirium: negative  Psychosis: negative  Aruna: negative  Safety Issues: Risk for seizures  Psychiatric ROS Comment:     Physical Exam      Mental Status Exam  General: Alert and oriented x 3  Appearance: Appears stated age  Attitude: Cooperative but restless  Behavior: Fidgety  Motor Activity: Normal  Speech: Normal rate and rhythm coherent  Mood: Anxious but not depressed  Affect: Anxious  Thought Process: No formal thought disorder  Thought Content: No delusions or suicidal  Thought Perception: No audiovisual hallucinations  Cognition: Intact  Insight: Fair  Judgement: Fair    Psychiatric Risk Assessment    High risk of withdrawal seizures    Last Recorded Vitals  Blood pressure (!) 130/103, pulse 73, temperature 36.6 °C (97.9 °F), temperature source Temporal, resp. rate 20, height 1.88 m (6' 2\"), weight 130 kg (286 lb 6 oz), SpO2 97 %.    Relevant Results  Results for orders placed or performed during the hospital encounter of 02/02/24 (from the past 96 hour(s))   ECG 12 lead   Result Value Ref Range    Ventricular Rate 93 BPM    Atrial Rate 93 BPM    OH Interval 148 ms    QRS Duration 86 ms    QT Interval 380 ms    QTC Calculation(Bazett) 472 ms    P Axis 61 degrees    R Axis 57 degrees    T Axis -10 degrees    QRS Count 16 beats    Q Onset 223 ms    P Onset 149 ms    P Offset 205 ms    T Offset 413 ms    QTC Fredericia 440 ms   CBC and Auto Differential   Result Value Ref Range    WBC 6.5 4.4 - 11.3 x10*3/uL    nRBC 0.0 0.0 - 0.0 /100 WBCs    RBC 4.32 (L) 4.50 - 5.90 x10*6/uL    Hemoglobin 14.8 13.5 - 17.5 g/dL    Hematocrit 43.6 41.0 - 52.0 %     (H) 80 - 100 fL    MCH " 34.3 (H) 26.0 - 34.0 pg    MCHC 33.9 32.0 - 36.0 g/dL    RDW 13.0 11.5 - 14.5 %    Platelets 203 150 - 450 x10*3/uL    Neutrophils % 57.2 40.0 - 80.0 %    Immature Granulocytes %, Automated 0.3 0.0 - 0.9 %    Lymphocytes % 31.4 13.0 - 44.0 %    Monocytes % 7.9 2.0 - 10.0 %    Eosinophils % 2.3 0.0 - 6.0 %    Basophils % 0.9 0.0 - 2.0 %    Neutrophils Absolute 3.69 1.20 - 7.70 x10*3/uL    Immature Granulocytes Absolute, Automated 0.02 0.00 - 0.70 x10*3/uL    Lymphocytes Absolute 2.03 1.20 - 4.80 x10*3/uL    Monocytes Absolute 0.51 0.10 - 1.00 x10*3/uL    Eosinophils Absolute 0.15 0.00 - 0.70 x10*3/uL    Basophils Absolute 0.06 0.00 - 0.10 x10*3/uL   Comprehensive Metabolic Panel   Result Value Ref Range    Glucose 144 (H) 74 - 99 mg/dL    Sodium 135 (L) 136 - 145 mmol/L    Potassium 3.6 3.5 - 5.3 mmol/L    Chloride 99 98 - 107 mmol/L    Bicarbonate 28 21 - 32 mmol/L    Anion Gap 12 10 - 20 mmol/L    Urea Nitrogen 8 6 - 23 mg/dL    Creatinine 0.89 0.50 - 1.30 mg/dL    eGFR >90 >60 mL/min/1.73m*2    Calcium 9.5 8.6 - 10.3 mg/dL    Albumin 3.9 3.4 - 5.0 g/dL    Alkaline Phosphatase 72 33 - 120 U/L    Total Protein 7.0 6.4 - 8.2 g/dL    AST 49 (H) 9 - 39 U/L    Bilirubin, Total 0.6 0.0 - 1.2 mg/dL    ALT 93 (H) 10 - 52 U/L   Coagulation Screen   Result Value Ref Range    Protime 12.0 9.8 - 12.8 seconds    INR 1.1 0.9 - 1.1    aPTT 32 27 - 38 seconds   Drug Screen, Urine With Reflex to Confirmation   Result Value Ref Range    Amphetamine Screen, Urine Presumptive Negative Presumptive Negative    Barbiturate Screen, Urine Presumptive Negative Presumptive Negative    Benzodiazepines Screen, Urine Presumptive Positive (A) Presumptive Negative    Cannabinoid Screen, Urine Presumptive Negative Presumptive Negative    Cocaine Metabolite Screen, Urine Presumptive Negative Presumptive Negative    Fentanyl Screen, Urine Presumptive Negative Presumptive Negative    Opiate Screen, Urine Presumptive Negative Presumptive Negative     Oxycodone Screen, Urine Presumptive Negative Presumptive Negative    PCP Screen, Urine Presumptive Negative Presumptive Negative   PST Top   Result Value Ref Range    Extra Tube Hold for add-ons.    Acute Toxicology Panel, Blood   Result Value Ref Range    Acetaminophen <10.0 10.0 - 30.0 ug/mL    Salicylate  <3 4 - 20 mg/dL    Alcohol <10 <=10 mg/dL   Sars-CoV-2 and Influenza A/B PCR   Result Value Ref Range    Flu A Result Not Detected Not Detected    Flu B Result Not Detected Not Detected    Coronavirus 2019, PCR Not Detected Not Detected   Sterile Cup   Result Value Ref Range    Extra Tube Hold for add-ons.    Renal function panel   Result Value Ref Range    Glucose 170 (H) 74 - 99 mg/dL    Sodium 135 (L) 136 - 145 mmol/L    Potassium 3.4 (L) 3.5 - 5.3 mmol/L    Chloride 101 98 - 107 mmol/L    Bicarbonate 27 21 - 32 mmol/L    Anion Gap 10 10 - 20 mmol/L    Urea Nitrogen 10 6 - 23 mg/dL    Creatinine 1.03 0.50 - 1.30 mg/dL    eGFR >90 >60 mL/min/1.73m*2    Calcium 9.2 8.6 - 10.3 mg/dL    Phosphorus 2.6 2.5 - 4.9 mg/dL    Albumin 3.5 3.4 - 5.0 g/dL   Magnesium   Result Value Ref Range    Magnesium 1.87 1.60 - 2.40 mg/dL   Magnesium   Result Value Ref Range    Magnesium 2.17 1.60 - 2.40 mg/dL   Hepatic function panel   Result Value Ref Range    Albumin 3.2 (L) 3.4 - 5.0 g/dL    Bilirubin, Total 0.4 0.0 - 1.2 mg/dL    Bilirubin, Direct 0.1 0.0 - 0.3 mg/dL    Alkaline Phosphatase 62 33 - 120 U/L    ALT 70 (H) 10 - 52 U/L    AST 38 9 - 39 U/L    Total Protein 6.3 (L) 6.4 - 8.2 g/dL   CBC   Result Value Ref Range    WBC 5.1 4.4 - 11.3 x10*3/uL    nRBC 0.0 0.0 - 0.0 /100 WBCs    RBC 3.83 (L) 4.50 - 5.90 x10*6/uL    Hemoglobin 13.4 (L) 13.5 - 17.5 g/dL    Hematocrit 39.2 (L) 41.0 - 52.0 %     (H) 80 - 100 fL    MCH 35.0 (H) 26.0 - 34.0 pg    MCHC 34.2 32.0 - 36.0 g/dL    RDW 13.0 11.5 - 14.5 %    Platelets 172 150 - 450 x10*3/uL   Phosphorus   Result Value Ref Range    Phosphorus 3.7 2.5 - 4.9 mg/dL   Basic  Metabolic Panel   Result Value Ref Range    Glucose 123 (H) 74 - 99 mg/dL    Sodium 136 136 - 145 mmol/L    Potassium 3.6 3.5 - 5.3 mmol/L    Chloride 102 98 - 107 mmol/L    Bicarbonate 29 21 - 32 mmol/L    Anion Gap 9 (L) 10 - 20 mmol/L    Urea Nitrogen 11 6 - 23 mg/dL    Creatinine 0.98 0.50 - 1.30 mg/dL    eGFR >90 >60 mL/min/1.73m*2    Calcium 9.1 8.6 - 10.3 mg/dL   Troponin I, High Sensitivity   Result Value Ref Range    Troponin I, High Sensitivity 4 0 - 20 ng/L     ECG 12 lead    Result Date: 2/3/2024  Normal sinus rhythm Cannot rule out Septal infarct , age undetermined T wave abnormality, consider inferior ischemia Borderline ECG When compared with ECG of 29-JAN-2024 10:29, Possible Septal infarct is now Present Confirmed by Oseas Caldwell ([id number]) on 2/3/2024 10:50:46 AM    ECG 12 lead    Result Date: 1/31/2024  Normal sinus rhythm Normal ECG When compared with ECG of 29-JAN-2024 08:29, (unconfirmed) No significant change was found See ED provider note for full interpretation and clinical correlation Confirmed by Alida Keys ([id number]) on 1/31/2024 11:05:20 AM    CT angio chest for pulmonary embolism    Result Date: 1/29/2024  Interpreted By:  Gage Gonzalez, STUDY: CT ANGIO CHEST FOR PULMONARY EMBOLISM;  1/29/2024 10:54 am   INDICATION: Signs/Symptoms:CP, elevated d-dimer.   COMPARISON: None.   ACCESSION NUMBER(S): [id number]   ORDERING CLINICIAN: JOHAN LENTZ   TECHNIQUE: Helical data acquisition of the chest was obtained following the intravenous administration of  75 ml of contrast/Omnipaque 350. Images were reformatted in axial, coronal, and sagittal planes. 3D post processing was performed on an independent workstation and volume rendered images of the pulmonary arteries were created and utilized in the interpretation.   FINDINGS: POTENTIAL LIMITATIONS OF THE STUDY:   Respiratory motion artifact.   HEART AND VESSELS: No filling defects are identified within the pulmonary arteries to indicate  the presence of a pulmonary embolism.   The aorta is normal in course and caliber.   The heart is not significantly enlarged.   No pericardial effusion is seen.   MEDIASTINUM AND MACHO, LOWER NECK AND AXILLA: The visualized thyroid gland is within normal limits.   No evidence of thoracic lymphadenopathy by CT criteria.   Esophagus appears within normal limits as seen.   LUNGS AND AIRWAYS: The trachea and central airways are patent. No endobronchial lesion.   Mild areas of atelectasis/scarring, most conspicuous in the lower lobes and lingula. No consolidation. No effusion. No pneumothorax. No suspicious point nodule or mass.   UPPER ABDOMEN: The visualized subdiaphragmatic structures demonstrate no acute abnormality. Hepatic steatosis.   CHEST WALL AND OSSEOUS STRUCTURES: Mild degenerative changes. No acute process.       No evidence of a pulmonary embolism. Mild areas of atelectasis/scarring in the lungs without focal consolidation Hepatic steatosis.   MACRO: None.   Signed by: Gage Gonzalez 1/29/2024 11:02 AM Dictation workstation:   [id number]    XR chest 1 view    Result Date: 1/29/2024  Interpreted By:  Gage Gonzalez, STUDY: XR CHEST 1 VIEW;  1/29/2024 10:12 am   INDICATION: Signs/Symptoms:Chest Pain.   COMPARISON: 12/29/2023   ACCESSION NUMBER(S): [id number]   ORDERING CLINICIAN: JOHAN LENTZ   FINDINGS: CHEST/LUNGS: The cardiac and mediastinal silhouettes are within normal limits for the technique. No focal areas of consolidation are noted. No effusion or pneumothorax is seen.   UPPER ABDOMEN: No remarkable upper abdominal findings.   OSSEOUS STRUCTURES: No acute changes.       No radiographic evidence of an acute cardiopulmonary process.   MACRO: None.   Signed by: Gage Gonzalez 1/29/2024 10:49 AM Dictation workstation:   [id number]    ECG 12 lead    Result Date: 1/29/2024  Normal sinus rhythm Normal ECG When compared with ECG of 29-JAN-2024 07:08, (unconfirmed) Premature ventricular complexes are no longer  Present See ED provider note for full interpretation and clinical correlation               Assessment/Plan   Principal Problem:    Benzodiazepine withdrawal with perceptual disturbance (CMS/HCC)      Patient is at high risk of seizure from benzo diazepam withdrawal and so he started the patient on Librium scheduled detox regimen for the next 4 days.  Also added CIWA protocol with Ativan for any breakthrough symptoms  Depakote for seizure prophylaxis  Patient is agreeable to go to residential rehab.  Social work consult to look into treatment options, possibly [facility]  Will continue to follow during the stay in the hospital           Brown Babb MD

## 2024-02-03 NOTE — CARE PLAN
The patient's goals for the shift include      The clinical goals for the shift include Pt will have CIWA less than 9 by end of shift 2/3/24    CIWA scoring is ongoing and pt scores vary.

## 2024-02-03 NOTE — H&P
History Of Present Illness  Deyvi Araujo is a 43 y.o. M with PMH of Poly substance abuse, ETOH abuse, obesity, presented to ER due to benzodiazapine withdrawal. Pt was also c/o of some chest discomforts. He attributed it to panic attacks Pt apparently had been sober for several months but recently relapsed. He was admitted on 3 N for further care.        Past Medical History  He has a past medical history of Alcohol abuse, Anxiety and depression, GERD (gastroesophageal reflux disease), HLD (hyperlipidemia), HTN (hypertension), Obesity (BMI 30.0-34.9), Polysubstance abuse (CMS/HCC), and PTSD (post-traumatic stress disorder).    Surgical History  He has no past surgical history on file.     Social History  He reports that he has been smoking cigarettes. He has been smoking an average of .5 packs per day. He has never used smokeless tobacco. He reports that he does not currently use drugs after having used the following drugs: Benzodiazepines. No history on file for alcohol use.    Family History  Family History   Problem Relation Name Age of Onset    Alcohol abuse Father          Allergies  Patient has no known allergies.    Current medications:      Current Facility-Administered Medications:     acetaminophen (Tylenol) tablet 650 mg, 650 mg, oral, q4h PRN, 650 mg at 02/03/24 0446 **OR** acetaminophen (Tylenol) oral liquid 650 mg, 650 mg, oral, q4h PRN **OR** acetaminophen (Tylenol) suppository 650 mg, 650 mg, rectal, q4h PRN, Norman Soto PA-C    bisacodyl (Dulcolax) EC tablet 10 mg, 10 mg, oral, Daily PRN, Norman Soto PA-C    buprenorphine-naloxone (Suboxone) 8-2 mg per SL tablet 2 tablet, 2 tablet, sublingual, Daily, Shonda Fernandez, APRN-CNP, 2 tablet at 02/03/24 0927    chlordiazePOXIDE (Librium) capsule 25 mg, 25 mg, oral, q6h, 25 mg at 02/03/24 1244 **FOLLOWED BY** [START ON 2/5/2024] chlordiazePOXIDE (Librium) capsule 25 mg, 25 mg, oral, q8h **FOLLOWED BY** [START ON 2/7/2024] chlordiazePOXIDE  (Librium) capsule 25 mg, 25 mg, oral, q12h, Brown Babb MD    divalproex (Depakote ER) 24 hr tablet 750 mg, 750 mg, oral, Daily, Brown Babb MD, 750 mg at 02/03/24 1351    enoxaparin (Lovenox) syringe 40 mg, 40 mg, subcutaneous, q24h, Norman Soto PA-C, 40 mg at 02/02/24 2052    folic acid (Folvite) tablet 1 mg, 1 mg, oral, Daily, Norman Soto PA-C, 1 mg at 02/03/24 0852    gabapentin (Neurontin) tablet 600 mg, 600 mg, oral, TID, Norman Soto PA-C, 600 mg at 02/03/24 1533    LORazepam (Ativan) tablet 0.5 mg, 0.5 mg, oral, q2h PRN, 0.5 mg at 02/03/24 1623 **OR** LORazepam (Ativan) tablet 1 mg, 1 mg, oral, q2h PRN **OR** LORazepam (Ativan) tablet 2 mg, 2 mg, oral, q2h PRN, Brown Babb MD, 2 mg at 02/03/24 1404    multivitamin with minerals 1 tablet, 1 tablet, oral, Daily, Norman Soto PA-C, 1 tablet at 02/03/24 0851    nicotine (Nicoderm CQ) 14 mg/24 hr patch 1 patch, 1 patch, transdermal, Daily, 1 patch at 02/03/24 0852 **FOLLOWED BY** [START ON 3/15/2024] nicotine (Nicoderm CQ) 7 mg/24 hr patch 1 patch, 1 patch, transdermal, Daily, Norman Soto PA-C    ondansetron ODT (Zofran-ODT) disintegrating tablet 4 mg, 4 mg, oral, q8h PRN, 4 mg at 02/03/24 1824 **OR** ondansetron (Zofran) injection 4 mg, 4 mg, intravenous, q8h PRN, Norman Soto PA-C    pantoprazole (ProtoNix) EC tablet 40 mg, 40 mg, oral, Daily before breakfast, 40 mg at 02/03/24 0852 **OR** pantoprazole (ProtoNix) injection 40 mg, 40 mg, intravenous, Daily before breakfast, Norman Soto PA-C    sennosides-docusate sodium (Gladis-Colace) 8.6-50 mg per tablet 2 tablet, 2 tablet, oral, BID, Norman Soto PA-C    thiamine (Vitamin B-1) tablet 100 mg, 100 mg, oral, Daily, Norman Soto PA-C, 100 mg at 02/03/24 0852    venlafaxine XR (Effexor-XR) 24 hr capsule 150 mg, 150 mg, oral, Daily, Norman Soto PA-C, 150 mg at 02/03/24 0852       Review of Systems       10 organ ROS is pertinent for history  "mentioned above, otherwise (-)ve     Physical Exam  HENT:      Head: Normocephalic.   Eyes:      Conjunctiva/sclera: Conjunctivae normal.   Cardiovascular:      Rate and Rhythm: Regular rhythm.   Pulmonary:      Breath sounds: Normal breath sounds.   Abdominal:      General: Bowel sounds are normal.      Palpations: Abdomen is soft.   Musculoskeletal:         General: Normal range of motion.   Skin:     General: Skin is warm and dry.   Neurological:      General: No focal deficit present.      Mental Status: He is alert.   Psychiatric:         Behavior: Behavior normal.              Last Recorded Vitals      Blood pressure (!) 149/98, pulse 70, temperature 36.6 °C (97.9 °F), resp. rate 18, height 1.88 m (6' 2\"), weight 130 kg (286 lb 6 oz), SpO2 96 %.    Relevant Results     Results for orders placed or performed during the hospital encounter of 02/02/24 (from the past 24 hour(s))   Renal function panel   Result Value Ref Range    Glucose 170 (H) 74 - 99 mg/dL    Sodium 135 (L) 136 - 145 mmol/L    Potassium 3.4 (L) 3.5 - 5.3 mmol/L    Chloride 101 98 - 107 mmol/L    Bicarbonate 27 21 - 32 mmol/L    Anion Gap 10 10 - 20 mmol/L    Urea Nitrogen 10 6 - 23 mg/dL    Creatinine 1.03 0.50 - 1.30 mg/dL    eGFR >90 >60 mL/min/1.73m*2    Calcium 9.2 8.6 - 10.3 mg/dL    Phosphorus 2.6 2.5 - 4.9 mg/dL    Albumin 3.5 3.4 - 5.0 g/dL   Magnesium   Result Value Ref Range    Magnesium 1.87 1.60 - 2.40 mg/dL   Magnesium   Result Value Ref Range    Magnesium 2.17 1.60 - 2.40 mg/dL   Hepatic function panel   Result Value Ref Range    Albumin 3.2 (L) 3.4 - 5.0 g/dL    Bilirubin, Total 0.4 0.0 - 1.2 mg/dL    Bilirubin, Direct 0.1 0.0 - 0.3 mg/dL    Alkaline Phosphatase 62 33 - 120 U/L    ALT 70 (H) 10 - 52 U/L    AST 38 9 - 39 U/L    Total Protein 6.3 (L) 6.4 - 8.2 g/dL   CBC   Result Value Ref Range    WBC 5.1 4.4 - 11.3 x10*3/uL    nRBC 0.0 0.0 - 0.0 /100 WBCs    RBC 3.83 (L) 4.50 - 5.90 x10*6/uL    Hemoglobin 13.4 (L) 13.5 - 17.5 g/dL "    Hematocrit 39.2 (L) 41.0 - 52.0 %     (H) 80 - 100 fL    MCH 35.0 (H) 26.0 - 34.0 pg    MCHC 34.2 32.0 - 36.0 g/dL    RDW 13.0 11.5 - 14.5 %    Platelets 172 150 - 450 x10*3/uL   Phosphorus   Result Value Ref Range    Phosphorus 3.7 2.5 - 4.9 mg/dL   Basic Metabolic Panel   Result Value Ref Range    Glucose 123 (H) 74 - 99 mg/dL    Sodium 136 136 - 145 mmol/L    Potassium 3.6 3.5 - 5.3 mmol/L    Chloride 102 98 - 107 mmol/L    Bicarbonate 29 21 - 32 mmol/L    Anion Gap 9 (L) 10 - 20 mmol/L    Urea Nitrogen 11 6 - 23 mg/dL    Creatinine 0.98 0.50 - 1.30 mg/dL    eGFR >90 >60 mL/min/1.73m*2    Calcium 9.1 8.6 - 10.3 mg/dL   Troponin I, High Sensitivity   Result Value Ref Range    Troponin I, High Sensitivity 4 0 - 20 ng/L            Assessment/Plan   Principal Problem:    Benzodiazepine withdrawal with perceptual disturbance (CMS/HCC)  Hx of Polysubstance abuse  GERD   Chest pain  Elevated ALT      PLAN: Pt was admitted on RMF, CIWA protocol, psych consult, psych consult, DVT prophylaxis, follow closely.       Blu Rodriguez MD

## 2024-02-03 NOTE — CARE PLAN
The patient's goals for the shift include      The clinical goals for the shift include Patient will use call light for assistance      Problem: Daily Care  Goal: Daily care needs are met  2/3/2024 0523 by Bev Gtz LPN  Outcome: Progressing  2/3/2024 0521 by Bev Gtz LPN  Outcome: Progressing     Problem: Psychosocial Needs  Goal: Demonstrates ability to cope with hospitalization/illness  2/3/2024 0523 by Bev Gtz LPN  Outcome: Progressing  2/3/2024 0521 by Bev Gtz LPN  Outcome: Progressing  Goal: Collaborate with me, my family, and caregiver to identify my specific goals  2/3/2024 0523 by Bev Gtz LPN  Outcome: Progressing  2/3/2024 0521 by Bev Gtz LPN  Outcome: Progressing     Problem: Discharge Barriers  Goal: My discharge needs are met  2/3/2024 0523 by Bev Gtz LPN  Outcome: Progressing  2/3/2024 0521 by Bev Gtz LPN  Outcome: Progressing

## 2024-02-03 NOTE — NURSING NOTE
Patient alert and oriented x4 and independent in the room. Patient denies pain but has requested valium for restlessness and withdrawal systems. No reports of hallucinations or disturbances. Patient continent. No acute changes this shift

## 2024-02-03 NOTE — CARE PLAN
The patient's goals for the shift include      The clinical goals for the shift include Patient will use call light for assistance      Problem: Daily Care  Goal: Daily care needs are met  Outcome: Progressing     Problem: Psychosocial Needs  Goal: Demonstrates ability to cope with hospitalization/illness  Outcome: Progressing  Goal: Collaborate with me, my family, and caregiver to identify my specific goals  Outcome: Progressing     Problem: Discharge Barriers  Goal: My discharge needs are met  Outcome: Progressing

## 2024-02-04 LAB
ALBUMIN SERPL BCP-MCNC: 3.4 G/DL (ref 3.4–5)
ANION GAP SERPL CALC-SCNC: 11 MMOL/L (ref 10–20)
BUN SERPL-MCNC: 11 MG/DL (ref 6–23)
CALCIUM SERPL-MCNC: 9.1 MG/DL (ref 8.6–10.3)
CHLORIDE SERPL-SCNC: 103 MMOL/L (ref 98–107)
CO2 SERPL-SCNC: 26 MMOL/L (ref 21–32)
CREAT SERPL-MCNC: 0.87 MG/DL (ref 0.5–1.3)
EGFRCR SERPLBLD CKD-EPI 2021: >90 ML/MIN/1.73M*2
ERYTHROCYTE [DISTWIDTH] IN BLOOD BY AUTOMATED COUNT: 12.9 % (ref 11.5–14.5)
GLUCOSE SERPL-MCNC: 113 MG/DL (ref 74–99)
HCT VFR BLD AUTO: 40.1 % (ref 41–52)
HGB BLD-MCNC: 13.6 G/DL (ref 13.5–17.5)
MAGNESIUM SERPL-MCNC: 2.09 MG/DL (ref 1.6–2.4)
MCH RBC QN AUTO: 34.8 PG (ref 26–34)
MCHC RBC AUTO-ENTMCNC: 33.9 G/DL (ref 32–36)
MCV RBC AUTO: 103 FL (ref 80–100)
NRBC BLD-RTO: 0 /100 WBCS (ref 0–0)
PHOSPHATE SERPL-MCNC: 3.6 MG/DL (ref 2.5–4.9)
PLATELET # BLD AUTO: 180 X10*3/UL (ref 150–450)
POTASSIUM SERPL-SCNC: 3.7 MMOL/L (ref 3.5–5.3)
RBC # BLD AUTO: 3.91 X10*6/UL (ref 4.5–5.9)
SODIUM SERPL-SCNC: 136 MMOL/L (ref 136–145)
WBC # BLD AUTO: 5.6 X10*3/UL (ref 4.4–11.3)

## 2024-02-04 PROCEDURE — 2500000001 HC RX 250 WO HCPCS SELF ADMINISTERED DRUGS (ALT 637 FOR MEDICARE OP): Performed by: PHYSICIAN ASSISTANT

## 2024-02-04 PROCEDURE — 2500000001 HC RX 250 WO HCPCS SELF ADMINISTERED DRUGS (ALT 637 FOR MEDICARE OP): Performed by: PSYCHIATRY & NEUROLOGY

## 2024-02-04 PROCEDURE — 99232 SBSQ HOSP IP/OBS MODERATE 35: CPT | Performed by: PSYCHIATRY & NEUROLOGY

## 2024-02-04 PROCEDURE — 83735 ASSAY OF MAGNESIUM: CPT | Performed by: PHYSICIAN ASSISTANT

## 2024-02-04 PROCEDURE — 2500000005 HC RX 250 GENERAL PHARMACY W/O HCPCS: Performed by: PHYSICIAN ASSISTANT

## 2024-02-04 PROCEDURE — 2500000002 HC RX 250 W HCPCS SELF ADMINISTERED DRUGS (ALT 637 FOR MEDICARE OP, ALT 636 FOR OP/ED): Performed by: NURSE PRACTITIONER

## 2024-02-04 PROCEDURE — 2500000004 HC RX 250 GENERAL PHARMACY W/ HCPCS (ALT 636 FOR OP/ED): Performed by: PHYSICIAN ASSISTANT

## 2024-02-04 PROCEDURE — 2500000002 HC RX 250 W HCPCS SELF ADMINISTERED DRUGS (ALT 637 FOR MEDICARE OP, ALT 636 FOR OP/ED): Performed by: PHYSICIAN ASSISTANT

## 2024-02-04 PROCEDURE — 85027 COMPLETE CBC AUTOMATED: CPT | Performed by: PHYSICIAN ASSISTANT

## 2024-02-04 PROCEDURE — 1200000002 HC GENERAL ROOM WITH TELEMETRY DAILY

## 2024-02-04 PROCEDURE — 36415 COLL VENOUS BLD VENIPUNCTURE: CPT | Performed by: PHYSICIAN ASSISTANT

## 2024-02-04 PROCEDURE — 80069 RENAL FUNCTION PANEL: CPT | Performed by: PHYSICIAN ASSISTANT

## 2024-02-04 PROCEDURE — S4991 NICOTINE PATCH NONLEGEND: HCPCS | Performed by: PHYSICIAN ASSISTANT

## 2024-02-04 RX ADMIN — LORAZEPAM 2 MG: 1 TABLET ORAL at 12:06

## 2024-02-04 RX ADMIN — ONDANSETRON 4 MG: 4 TABLET, ORALLY DISINTEGRATING ORAL at 20:16

## 2024-02-04 RX ADMIN — ACETAMINOPHEN 650 MG: 325 TABLET ORAL at 16:17

## 2024-02-04 RX ADMIN — BUPRENORPHINE AND NALOXONE 2 TABLET: 8; 2 TABLET SUBLINGUAL at 09:18

## 2024-02-04 RX ADMIN — PANTOPRAZOLE SODIUM 40 MG: 40 TABLET, DELAYED RELEASE ORAL at 06:55

## 2024-02-04 RX ADMIN — LORAZEPAM 1 MG: 1 TABLET ORAL at 00:50

## 2024-02-04 RX ADMIN — LORAZEPAM 0.5 MG: 0.5 TABLET ORAL at 05:09

## 2024-02-04 RX ADMIN — LORAZEPAM 2 MG: 1 TABLET ORAL at 20:16

## 2024-02-04 RX ADMIN — ACETAMINOPHEN 650 MG: 325 TABLET ORAL at 20:16

## 2024-02-04 RX ADMIN — DIVALPROEX SODIUM 750 MG: 500 TABLET, FILM COATED, EXTENDED RELEASE ORAL at 09:18

## 2024-02-04 RX ADMIN — CHLORDIAZEPOXIDE HYDROCHLORIDE 25 MG: 25 CAPSULE ORAL at 18:09

## 2024-02-04 RX ADMIN — NICOTINE 1 PATCH: 14 PATCH, EXTENDED RELEASE TRANSDERMAL at 09:18

## 2024-02-04 RX ADMIN — FOLIC ACID 1 MG: 1 TABLET ORAL at 09:18

## 2024-02-04 RX ADMIN — VENLAFAXINE HYDROCHLORIDE 150 MG: 75 CAPSULE, EXTENDED RELEASE ORAL at 09:18

## 2024-02-04 RX ADMIN — LORAZEPAM 2 MG: 1 TABLET ORAL at 16:17

## 2024-02-04 RX ADMIN — LORAZEPAM 2 MG: 1 TABLET ORAL at 14:07

## 2024-02-04 RX ADMIN — GABAPENTIN 600 MG: 600 TABLET, FILM COATED ORAL at 20:16

## 2024-02-04 RX ADMIN — CHLORDIAZEPOXIDE HYDROCHLORIDE 25 MG: 25 CAPSULE ORAL at 12:05

## 2024-02-04 RX ADMIN — ACETAMINOPHEN 650 MG: 325 TABLET ORAL at 09:19

## 2024-02-04 RX ADMIN — CHLORDIAZEPOXIDE HYDROCHLORIDE 25 MG: 25 CAPSULE ORAL at 00:50

## 2024-02-04 RX ADMIN — Medication 1 TABLET: at 09:18

## 2024-02-04 RX ADMIN — THIAMINE HCL TAB 100 MG 100 MG: 100 TAB at 09:18

## 2024-02-04 RX ADMIN — ENOXAPARIN SODIUM 40 MG: 40 INJECTION SUBCUTANEOUS at 20:16

## 2024-02-04 RX ADMIN — GABAPENTIN 600 MG: 600 TABLET, FILM COATED ORAL at 09:18

## 2024-02-04 RX ADMIN — ACETAMINOPHEN 650 MG: 325 TABLET ORAL at 00:50

## 2024-02-04 RX ADMIN — LORAZEPAM 2 MG: 1 TABLET ORAL at 09:19

## 2024-02-04 RX ADMIN — LORAZEPAM 2 MG: 1 TABLET ORAL at 18:09

## 2024-02-04 RX ADMIN — LORAZEPAM 0.5 MG: 0.5 TABLET ORAL at 03:03

## 2024-02-04 RX ADMIN — LORAZEPAM 2 MG: 1 TABLET ORAL at 22:17

## 2024-02-04 RX ADMIN — LORAZEPAM 1 MG: 1 TABLET ORAL at 07:06

## 2024-02-04 RX ADMIN — ONDANSETRON 4 MG: 4 TABLET, ORALLY DISINTEGRATING ORAL at 07:06

## 2024-02-04 RX ADMIN — CHLORDIAZEPOXIDE HYDROCHLORIDE 25 MG: 25 CAPSULE ORAL at 06:55

## 2024-02-04 RX ADMIN — GABAPENTIN 600 MG: 600 TABLET, FILM COATED ORAL at 14:07

## 2024-02-04 ASSESSMENT — LIFESTYLE VARIABLES
BLOOD PRESSURE: 123/92
AGITATION: NORMAL ACTIVITY
TREMOR: 2
AGITATION: NORMAL ACTIVITY
VISUAL DISTURBANCES: NOT PRESENT
TACTILE DISTURBANCES: MILD ITCHING, PINS AND NEEDLES, BURNING OR NUMBNESS
TACTILE DISTURBANCES: VERY MILD ITCHING, PINS AND NEEDLES, BURNING OR NUMBNESS
PAROXYSMAL SWEATS: 3
VISUAL DISTURBANCES: NOT PRESENT
HEADACHE, FULLNESS IN HEAD: MODERATE
TACTILE DISTURBANCES: VERY MILD ITCHING, PINS AND NEEDLES, BURNING OR NUMBNESS
TOTAL SCORE: 15
PAROXYSMAL SWEATS: 2
AUDITORY DISTURBANCES: NOT PRESENT
TOTAL SCORE: 12
VISUAL DISTURBANCES: NOT PRESENT
AGITATION: NORMAL ACTIVITY
ORIENTATION AND CLOUDING OF SENSORIUM: ORIENTED AND CAN DO SERIAL ADDITIONS
PAROXYSMAL SWEATS: 2
AGITATION: NORMAL ACTIVITY
HEADACHE, FULLNESS IN HEAD: VERY MILD
AUDITORY DISTURBANCES: NOT PRESENT
NAUSEA AND VOMITING: MILD NAUSEA WITH NO VOMITING
ORIENTATION AND CLOUDING OF SENSORIUM: ORIENTED AND CAN DO SERIAL ADDITIONS
TOTAL SCORE: 15
AGITATION: NORMAL ACTIVITY
AGITATION: NORMAL ACTIVITY
ANXIETY: 3
PAROXYSMAL SWEATS: 2
VISUAL DISTURBANCES: NOT PRESENT
HEADACHE, FULLNESS IN HEAD: MODERATE
PAROXYSMAL SWEATS: 3
NAUSEA AND VOMITING: NO NAUSEA AND NO VOMITING
ORIENTATION AND CLOUDING OF SENSORIUM: ORIENTED AND CAN DO SERIAL ADDITIONS
AUDITORY DISTURBANCES: VERY MILD HARSHNESS OR ABILITY TO FRIGHTEN
ANXIETY: MODERATELY ANXIOUS, OR GUARDED, SO ANXIETY IS INFERRED
HEADACHE, FULLNESS IN HEAD: MILD
TREMOR: 2
NAUSEA AND VOMITING: MILD NAUSEA WITH NO VOMITING
PAROXYSMAL SWEATS: BARELY PERCEPTIBLE SWEATING, PALMS MOIST
ORIENTATION AND CLOUDING OF SENSORIUM: ORIENTED AND CAN DO SERIAL ADDITIONS
TOTAL SCORE: 11
BLOOD PRESSURE: 138/97
TOTAL SCORE: 8
ANXIETY: MODERATELY ANXIOUS, OR GUARDED, SO ANXIETY IS INFERRED
TOTAL SCORE: 6
TACTILE DISTURBANCES: VERY MILD ITCHING, PINS AND NEEDLES, BURNING OR NUMBNESS
TOTAL SCORE: 12
TREMOR: 2
TOTAL SCORE: 17
AUDITORY DISTURBANCES: NOT PRESENT
AGITATION: NORMAL ACTIVITY
BLOOD PRESSURE: 115/75
NAUSEA AND VOMITING: NO NAUSEA AND NO VOMITING
TREMOR: 2
TREMOR: 2
HEADACHE, FULLNESS IN HEAD: NOT PRESENT
VISUAL DISTURBANCES: VERY MILD SENSITIVITY
TREMOR: NOT VISIBLE, BUT CAN BE FELT FINGERTIP TO FINGERTIP
VISUAL DISTURBANCES: VERY MILD SENSITIVITY
AGITATION: NORMAL ACTIVITY
ANXIETY: NO ANXIETY, AT EASE
ORIENTATION AND CLOUDING OF SENSORIUM: ORIENTED AND CAN DO SERIAL ADDITIONS
HEADACHE, FULLNESS IN HEAD: MODERATE
HEADACHE, FULLNESS IN HEAD: NOT PRESENT
NAUSEA AND VOMITING: MILD NAUSEA WITH NO VOMITING
TREMOR: NOT VISIBLE, BUT CAN BE FELT FINGERTIP TO FINGERTIP
AUDITORY DISTURBANCES: VERY MILD HARSHNESS OR ABILITY TO FRIGHTEN
ANXIETY: MILDLY ANXIOUS
TOTAL SCORE: 4
PAROXYSMAL SWEATS: BEADS OF SWEAT OBVIOUS ON FOREHEAD
PULSE: 80
AGITATION: NORMAL ACTIVITY
NAUSEA AND VOMITING: NO NAUSEA AND NO VOMITING
ORIENTATION AND CLOUDING OF SENSORIUM: ORIENTED AND CAN DO SERIAL ADDITIONS
TACTILE DISTURBANCES: VERY MILD ITCHING, PINS AND NEEDLES, BURNING OR NUMBNESS
TOTAL SCORE: 8
VISUAL DISTURBANCES: NOT PRESENT
AGITATION: NORMAL ACTIVITY
NAUSEA AND VOMITING: MILD NAUSEA WITH NO VOMITING
HEADACHE, FULLNESS IN HEAD: NOT PRESENT
NAUSEA AND VOMITING: MILD NAUSEA WITH NO VOMITING
NAUSEA AND VOMITING: NO NAUSEA AND NO VOMITING
ANXIETY: NO ANXIETY, AT EASE
PAROXYSMAL SWEATS: BEADS OF SWEAT OBVIOUS ON FOREHEAD
AUDITORY DISTURBANCES: VERY MILD HARSHNESS OR ABILITY TO FRIGHTEN
AUDITORY DISTURBANCES: VERY MILD HARSHNESS OR ABILITY TO FRIGHTEN
PAROXYSMAL SWEATS: 3
TACTILE DISTURBANCES: VERY MILD ITCHING, PINS AND NEEDLES, BURNING OR NUMBNESS
ORIENTATION AND CLOUDING OF SENSORIUM: ORIENTED AND CAN DO SERIAL ADDITIONS
AUDITORY DISTURBANCES: VERY MILD HARSHNESS OR ABILITY TO FRIGHTEN
ORIENTATION AND CLOUDING OF SENSORIUM: ORIENTED AND CAN DO SERIAL ADDITIONS
ANXIETY: 2
PULSE: 64
NAUSEA AND VOMITING: MILD NAUSEA WITH NO VOMITING
TREMOR: 2
TACTILE DISTURBANCES: VERY MILD ITCHING, PINS AND NEEDLES, BURNING OR NUMBNESS
ORIENTATION AND CLOUDING OF SENSORIUM: ORIENTED AND CAN DO SERIAL ADDITIONS
HEADACHE, FULLNESS IN HEAD: MILD
TOTAL SCORE: 6
ANXIETY: NO ANXIETY, AT EASE
TACTILE DISTURBANCES: VERY MILD ITCHING, PINS AND NEEDLES, BURNING OR NUMBNESS
AUDITORY DISTURBANCES: VERY MILD HARSHNESS OR ABILITY TO FRIGHTEN
VISUAL DISTURBANCES: VERY MILD SENSITIVITY
ORIENTATION AND CLOUDING OF SENSORIUM: ORIENTED AND CAN DO SERIAL ADDITIONS
ORIENTATION AND CLOUDING OF SENSORIUM: ORIENTED AND CAN DO SERIAL ADDITIONS
ANXIETY: 3
PAROXYSMAL SWEATS: BEADS OF SWEAT OBVIOUS ON FOREHEAD
TREMOR: NOT VISIBLE, BUT CAN BE FELT FINGERTIP TO FINGERTIP
HEADACHE, FULLNESS IN HEAD: MILD
TACTILE DISTURBANCES: VERY MILD ITCHING, PINS AND NEEDLES, BURNING OR NUMBNESS
AUDITORY DISTURBANCES: VERY MILD HARSHNESS OR ABILITY TO FRIGHTEN
VISUAL DISTURBANCES: NOT PRESENT
ANXIETY: NO ANXIETY, AT EASE
TREMOR: NO TREMOR
HEADACHE, FULLNESS IN HEAD: MILD
AUDITORY DISTURBANCES: NOT PRESENT
VISUAL DISTURBANCES: NOT PRESENT
TACTILE DISTURBANCES: VERY MILD ITCHING, PINS AND NEEDLES, BURNING OR NUMBNESS
NAUSEA AND VOMITING: NO NAUSEA AND NO VOMITING
TREMOR: NOT VISIBLE, BUT CAN BE FELT FINGERTIP TO FINGERTIP
NAUSEA AND VOMITING: NO NAUSEA AND NO VOMITING
PULSE: 91
ANXIETY: MODERATELY ANXIOUS, OR GUARDED, SO ANXIETY IS INFERRED
AGITATION: NORMAL ACTIVITY
NAUSEA AND VOMITING: MILD NAUSEA WITH NO VOMITING
BLOOD PRESSURE: 153/105
AUDITORY DISTURBANCES: VERY MILD HARSHNESS OR ABILITY TO FRIGHTEN
TACTILE DISTURBANCES: VERY MILD ITCHING, PINS AND NEEDLES, BURNING OR NUMBNESS
ANXIETY: NO ANXIETY, AT EASE
ORIENTATION AND CLOUDING OF SENSORIUM: ORIENTED AND CAN DO SERIAL ADDITIONS
TACTILE DISTURBANCES: VERY MILD ITCHING, PINS AND NEEDLES, BURNING OR NUMBNESS
TOTAL SCORE: 6
AGITATION: NORMAL ACTIVITY
PAROXYSMAL SWEATS: BARELY PERCEPTIBLE SWEATING, PALMS MOIST
HEADACHE, FULLNESS IN HEAD: MILD
PAROXYSMAL SWEATS: 3
ANXIETY: 3
VISUAL DISTURBANCES: VERY MILD SENSITIVITY
ORIENTATION AND CLOUDING OF SENSORIUM: CANNOT DO SERIAL ADDITIONS OR IS UNCERTAIN ABOUT DATE
VISUAL DISTURBANCES: NOT PRESENT
HEADACHE, FULLNESS IN HEAD: VERY MILD
TREMOR: 2
PAROXYSMAL SWEATS: 3
AGITATION: NORMAL ACTIVITY
AUDITORY DISTURBANCES: VERY MILD HARSHNESS OR ABILITY TO FRIGHTEN
TREMOR: NO TREMOR
TACTILE DISTURBANCES: VERY MILD ITCHING, PINS AND NEEDLES, BURNING OR NUMBNESS
TOTAL SCORE: 14
VISUAL DISTURBANCES: VERY MILD SENSITIVITY

## 2024-02-04 ASSESSMENT — PAIN SCALES - GENERAL
PAINLEVEL_OUTOF10: 2
PAINLEVEL_OUTOF10: 3
PAINLEVEL_OUTOF10: 3
PAINLEVEL_OUTOF10: 0 - NO PAIN
PAINLEVEL_OUTOF10: 5 - MODERATE PAIN
PAINLEVEL_OUTOF10: 0 - NO PAIN
PAINLEVEL_OUTOF10: 5 - MODERATE PAIN
PAINLEVEL_OUTOF10: 3
PAINLEVEL_OUTOF10: 2

## 2024-02-04 ASSESSMENT — COGNITIVE AND FUNCTIONAL STATUS - GENERAL
DAILY ACTIVITIY SCORE: 24
MOBILITY SCORE: 24

## 2024-02-04 ASSESSMENT — PAIN - FUNCTIONAL ASSESSMENT
PAIN_FUNCTIONAL_ASSESSMENT: 0-10

## 2024-02-04 ASSESSMENT — PAIN DESCRIPTION - DESCRIPTORS
DESCRIPTORS: ACHING
DESCRIPTORS: ACHING;SHARP
DESCRIPTORS: ACHING
DESCRIPTORS: ACHING

## 2024-02-04 ASSESSMENT — PAIN DESCRIPTION - LOCATION: LOCATION: HEAD

## 2024-02-04 NOTE — NURSING NOTE
Pt remains on Q2 hour CIWAs. Pt received multiple doses of ativan this shift due to CIWA scores. Pt did not have any agitation this shift for me. Seizure pads remain in place. Safety maintained

## 2024-02-04 NOTE — PROGRESS NOTES
Met with pt to provide [facility] folder. Pt had told SW he preferred to call on his own. Pt stated no other needs at this time and was appreciative of the info.

## 2024-02-04 NOTE — PROGRESS NOTES
"Deyvi Araujo is a 43 y.o. male on day 2 of admission presenting with Benzodiazepine withdrawal with perceptual disturbance (CMS/HCC).    SUBJECTIVE:  Patient continues to go to benzo withdrawal needing CIWA protocol on top of the scheduled Librium.  Reports feeling anxious but denies depression or suicidal.  He continued to remain high risk of seizures from benzodiazepine withdrawal  He is compliant with all medication    Exam:  Vital Signs: BP (!) 153/105   Pulse 80   Temp 36.4 °C (97.5 °F)   Resp 18   Ht 1.88 m (6' 2\")   Wt 130 kg (286 lb 6 oz)   SpO2 98%   BMI 36.77 kg/m²   Musculoskeletal: Muscle tone: WNL  Gait/Station: normal      Mental Status Exam:    General: Alert and oriented x 3  Appearance: Appears stated age  Attitude: Cooperative but restless  Behavior: Fidgety  Motor Activity: Normal  Speech: Normal rate and rhythm coherent  Mood: Anxious but not depressed  Affect: Anxious  Thought Process: No formal thought disorder  Thought Content: No delusions or suicidal  Thought Perception: No audiovisual hallucinations  Cognition: Intact  Insight: Fair  Judgement: Fair      Results for orders placed or performed during the hospital encounter of 02/02/24 (from the past 96 hour(s))   ECG 12 lead   Result Value Ref Range    Ventricular Rate 93 BPM    Atrial Rate 93 BPM    OH Interval 148 ms    QRS Duration 86 ms    QT Interval 380 ms    QTC Calculation(Bazett) 472 ms    P Axis 61 degrees    R Axis 57 degrees    T Axis -10 degrees    QRS Count 16 beats    Q Onset 223 ms    P Onset 149 ms    P Offset 205 ms    T Offset 413 ms    QTC Fredericia 440 ms   CBC and Auto Differential   Result Value Ref Range    WBC 6.5 4.4 - 11.3 x10*3/uL    nRBC 0.0 0.0 - 0.0 /100 WBCs    RBC 4.32 (L) 4.50 - 5.90 x10*6/uL    Hemoglobin 14.8 13.5 - 17.5 g/dL    Hematocrit 43.6 41.0 - 52.0 %     (H) 80 - 100 fL    MCH 34.3 (H) 26.0 - 34.0 pg    MCHC 33.9 32.0 - 36.0 g/dL    RDW 13.0 11.5 - 14.5 %    Platelets 203 150 - " 450 x10*3/uL    Neutrophils % 57.2 40.0 - 80.0 %    Immature Granulocytes %, Automated 0.3 0.0 - 0.9 %    Lymphocytes % 31.4 13.0 - 44.0 %    Monocytes % 7.9 2.0 - 10.0 %    Eosinophils % 2.3 0.0 - 6.0 %    Basophils % 0.9 0.0 - 2.0 %    Neutrophils Absolute 3.69 1.20 - 7.70 x10*3/uL    Immature Granulocytes Absolute, Automated 0.02 0.00 - 0.70 x10*3/uL    Lymphocytes Absolute 2.03 1.20 - 4.80 x10*3/uL    Monocytes Absolute 0.51 0.10 - 1.00 x10*3/uL    Eosinophils Absolute 0.15 0.00 - 0.70 x10*3/uL    Basophils Absolute 0.06 0.00 - 0.10 x10*3/uL   Comprehensive Metabolic Panel   Result Value Ref Range    Glucose 144 (H) 74 - 99 mg/dL    Sodium 135 (L) 136 - 145 mmol/L    Potassium 3.6 3.5 - 5.3 mmol/L    Chloride 99 98 - 107 mmol/L    Bicarbonate 28 21 - 32 mmol/L    Anion Gap 12 10 - 20 mmol/L    Urea Nitrogen 8 6 - 23 mg/dL    Creatinine 0.89 0.50 - 1.30 mg/dL    eGFR >90 >60 mL/min/1.73m*2    Calcium 9.5 8.6 - 10.3 mg/dL    Albumin 3.9 3.4 - 5.0 g/dL    Alkaline Phosphatase 72 33 - 120 U/L    Total Protein 7.0 6.4 - 8.2 g/dL    AST 49 (H) 9 - 39 U/L    Bilirubin, Total 0.6 0.0 - 1.2 mg/dL    ALT 93 (H) 10 - 52 U/L   Coagulation Screen   Result Value Ref Range    Protime 12.0 9.8 - 12.8 seconds    INR 1.1 0.9 - 1.1    aPTT 32 27 - 38 seconds   Drug Screen, Urine With Reflex to Confirmation   Result Value Ref Range    Amphetamine Screen, Urine Presumptive Negative Presumptive Negative    Barbiturate Screen, Urine Presumptive Negative Presumptive Negative    Benzodiazepines Screen, Urine Presumptive Positive (A) Presumptive Negative    Cannabinoid Screen, Urine Presumptive Negative Presumptive Negative    Cocaine Metabolite Screen, Urine Presumptive Negative Presumptive Negative    Fentanyl Screen, Urine Presumptive Negative Presumptive Negative    Opiate Screen, Urine Presumptive Negative Presumptive Negative    Oxycodone Screen, Urine Presumptive Negative Presumptive Negative    PCP Screen, Urine Presumptive  Negative Presumptive Negative   PST Top   Result Value Ref Range    Extra Tube Hold for add-ons.    Acute Toxicology Panel, Blood   Result Value Ref Range    Acetaminophen <10.0 10.0 - 30.0 ug/mL    Salicylate  <3 4 - 20 mg/dL    Alcohol <10 <=10 mg/dL   Sars-CoV-2 and Influenza A/B PCR   Result Value Ref Range    Flu A Result Not Detected Not Detected    Flu B Result Not Detected Not Detected    Coronavirus 2019, PCR Not Detected Not Detected   Sterile Cup   Result Value Ref Range    Extra Tube Hold for add-ons.    Renal function panel   Result Value Ref Range    Glucose 170 (H) 74 - 99 mg/dL    Sodium 135 (L) 136 - 145 mmol/L    Potassium 3.4 (L) 3.5 - 5.3 mmol/L    Chloride 101 98 - 107 mmol/L    Bicarbonate 27 21 - 32 mmol/L    Anion Gap 10 10 - 20 mmol/L    Urea Nitrogen 10 6 - 23 mg/dL    Creatinine 1.03 0.50 - 1.30 mg/dL    eGFR >90 >60 mL/min/1.73m*2    Calcium 9.2 8.6 - 10.3 mg/dL    Phosphorus 2.6 2.5 - 4.9 mg/dL    Albumin 3.5 3.4 - 5.0 g/dL   Magnesium   Result Value Ref Range    Magnesium 1.87 1.60 - 2.40 mg/dL   Magnesium   Result Value Ref Range    Magnesium 2.17 1.60 - 2.40 mg/dL   Hepatic function panel   Result Value Ref Range    Albumin 3.2 (L) 3.4 - 5.0 g/dL    Bilirubin, Total 0.4 0.0 - 1.2 mg/dL    Bilirubin, Direct 0.1 0.0 - 0.3 mg/dL    Alkaline Phosphatase 62 33 - 120 U/L    ALT 70 (H) 10 - 52 U/L    AST 38 9 - 39 U/L    Total Protein 6.3 (L) 6.4 - 8.2 g/dL   CBC   Result Value Ref Range    WBC 5.1 4.4 - 11.3 x10*3/uL    nRBC 0.0 0.0 - 0.0 /100 WBCs    RBC 3.83 (L) 4.50 - 5.90 x10*6/uL    Hemoglobin 13.4 (L) 13.5 - 17.5 g/dL    Hematocrit 39.2 (L) 41.0 - 52.0 %     (H) 80 - 100 fL    MCH 35.0 (H) 26.0 - 34.0 pg    MCHC 34.2 32.0 - 36.0 g/dL    RDW 13.0 11.5 - 14.5 %    Platelets 172 150 - 450 x10*3/uL   Phosphorus   Result Value Ref Range    Phosphorus 3.7 2.5 - 4.9 mg/dL   Basic Metabolic Panel   Result Value Ref Range    Glucose 123 (H) 74 - 99 mg/dL    Sodium 136 136 - 145 mmol/L     Potassium 3.6 3.5 - 5.3 mmol/L    Chloride 102 98 - 107 mmol/L    Bicarbonate 29 21 - 32 mmol/L    Anion Gap 9 (L) 10 - 20 mmol/L    Urea Nitrogen 11 6 - 23 mg/dL    Creatinine 0.98 0.50 - 1.30 mg/dL    eGFR >90 >60 mL/min/1.73m*2    Calcium 9.1 8.6 - 10.3 mg/dL   Troponin I, High Sensitivity   Result Value Ref Range    Troponin I, High Sensitivity 4 0 - 20 ng/L   Renal Function Panel   Result Value Ref Range    Glucose 113 (H) 74 - 99 mg/dL    Sodium 136 136 - 145 mmol/L    Potassium 3.7 3.5 - 5.3 mmol/L    Chloride 103 98 - 107 mmol/L    Bicarbonate 26 21 - 32 mmol/L    Anion Gap 11 10 - 20 mmol/L    Urea Nitrogen 11 6 - 23 mg/dL    Creatinine 0.87 0.50 - 1.30 mg/dL    eGFR >90 >60 mL/min/1.73m*2    Calcium 9.1 8.6 - 10.3 mg/dL    Phosphorus 3.6 2.5 - 4.9 mg/dL    Albumin 3.4 3.4 - 5.0 g/dL   Magnesium   Result Value Ref Range    Magnesium 2.09 1.60 - 2.40 mg/dL   CBC   Result Value Ref Range    WBC 5.6 4.4 - 11.3 x10*3/uL    nRBC 0.0 0.0 - 0.0 /100 WBCs    RBC 3.91 (L) 4.50 - 5.90 x10*6/uL    Hemoglobin 13.6 13.5 - 17.5 g/dL    Hematocrit 40.1 (L) 41.0 - 52.0 %     (H) 80 - 100 fL    MCH 34.8 (H) 26.0 - 34.0 pg    MCHC 33.9 32.0 - 36.0 g/dL    RDW 12.9 11.5 - 14.5 %    Platelets 180 150 - 450 x10*3/uL         Impression:  Benzodiazepine withdrawal    Recommendations:    1.  Continue with the detox protocol ordered with Librium    2.  Please use Ativan only as needed if there are any objective signs of benzo withdrawal and not just for patient asking for anxiety    3.  Continue Depakote for seizure prophylaxis    4.  Patient is not ready for discharge since he is at high risk of seizure if discharged home without any of the benzodiazepine prescription    5.  Encourage patient to consider residential rehab on discharge    Thank you for allowing us to participate in the care of this patient.       Brown Babb MD  2/4/2024  12:53 PM

## 2024-02-04 NOTE — PROGRESS NOTES
Deyvi Araujo is a 43 y.o. male on day 2 of admission presenting with Benzodiazepine withdrawal with perceptual disturbance (CMS/HCC).    Subjective   No cp/SOB       Objective         Current Facility-Administered Medications:     acetaminophen (Tylenol) tablet 650 mg, 650 mg, oral, q4h PRN, 650 mg at 02/04/24 0919 **OR** acetaminophen (Tylenol) oral liquid 650 mg, 650 mg, oral, q4h PRN **OR** acetaminophen (Tylenol) suppository 650 mg, 650 mg, rectal, q4h PRN, Norman Soto PA-C    bisacodyl (Dulcolax) EC tablet 10 mg, 10 mg, oral, Daily PRN, Norman Soto PA-C    buprenorphine-naloxone (Suboxone) 8-2 mg per SL tablet 2 tablet, 2 tablet, sublingual, Daily, Shonda Fernandez, APRN-CNP, 2 tablet at 02/04/24 0918    chlordiazePOXIDE (Librium) capsule 25 mg, 25 mg, oral, q6h, 25 mg at 02/04/24 1205 **FOLLOWED BY** [START ON 2/5/2024] chlordiazePOXIDE (Librium) capsule 25 mg, 25 mg, oral, q8h **FOLLOWED BY** [START ON 2/7/2024] chlordiazePOXIDE (Librium) capsule 25 mg, 25 mg, oral, q12h, Brown Babb MD    divalproex (Depakote ER) 24 hr tablet 750 mg, 750 mg, oral, Daily, Brown Babb MD, 750 mg at 02/04/24 0918    enoxaparin (Lovenox) syringe 40 mg, 40 mg, subcutaneous, q24h, Norman Soto PA-C, 40 mg at 02/03/24 2030    folic acid (Folvite) tablet 1 mg, 1 mg, oral, Daily, Norman Soto PA-C, 1 mg at 02/04/24 0918    gabapentin (Neurontin) tablet 600 mg, 600 mg, oral, TID, Norman Soto PA-C, 600 mg at 02/04/24 1407    LORazepam (Ativan) tablet 0.5 mg, 0.5 mg, oral, q2h PRN, 0.5 mg at 02/04/24 0509 **OR** LORazepam (Ativan) tablet 1 mg, 1 mg, oral, q2h PRN, 1 mg at 02/04/24 0706 **OR** LORazepam (Ativan) tablet 2 mg, 2 mg, oral, q2h PRN, Brown Babb MD, 2 mg at 02/04/24 1407    multivitamin with minerals 1 tablet, 1 tablet, oral, Daily, Norman Soto PA-C, 1 tablet at 02/04/24 0918    nicotine (Nicoderm CQ) 14 mg/24 hr patch 1 patch, 1 patch, transdermal, Daily, 1 patch at  "02/04/24 0918 **FOLLOWED BY** [START ON 3/15/2024] nicotine (Nicoderm CQ) 7 mg/24 hr patch 1 patch, 1 patch, transdermal, Daily, Norman Soto PA-C    ondansetron ODT (Zofran-ODT) disintegrating tablet 4 mg, 4 mg, oral, q8h PRN, 4 mg at 02/04/24 0706 **OR** ondansetron (Zofran) injection 4 mg, 4 mg, intravenous, q8h PRN, Norman Soto PA-C    pantoprazole (ProtoNix) EC tablet 40 mg, 40 mg, oral, Daily before breakfast, 40 mg at 02/04/24 0655 **OR** pantoprazole (ProtoNix) injection 40 mg, 40 mg, intravenous, Daily before breakfast, Norman Soto PA-C    sennosides-docusate sodium (Gladis-Colace) 8.6-50 mg per tablet 2 tablet, 2 tablet, oral, BID, Norman Soto PA-C    thiamine (Vitamin B-1) tablet 100 mg, 100 mg, oral, Daily, Norman Soto PA-C, 100 mg at 02/04/24 0918    venlafaxine XR (Effexor-XR) 24 hr capsule 150 mg, 150 mg, oral, Daily, Norman Soto PA-C, 150 mg at 02/04/24 0918       Physical Exam  HENT:      Head: Normocephalic.   Eyes:      Conjunctiva/sclera: Conjunctivae normal.   Cardiovascular:      Rate and Rhythm: Regular rhythm.   Pulmonary:      Breath sounds: Normal breath sounds.   Abdominal:      General: Bowel sounds are normal.      Palpations: Abdomen is soft.   Musculoskeletal:         General: Normal range of motion.   Skin:     General: Skin is warm and dry.   Neurological:      General: No focal deficit present.      Mental Status: He is alert.   Psychiatric:         Behavior: Behavior normal.           Last Recorded Vitals  Blood pressure 115/75, pulse 83, temperature 36.6 °C (97.9 °F), temperature source Temporal, resp. rate 18, height 1.88 m (6' 2\"), weight 130 kg (286 lb 6 oz), SpO2 96 %.  Intake/Output last 3 Shifts:  I/O last 3 completed shifts:  In: 200 (1.5 mL/kg) [P.O.:200]  Out: - (0 mL/kg)   Weight: 129.9 kg     Labs:       Results for orders placed or performed during the hospital encounter of 02/02/24 (from the past 24 hour(s))   Renal Function Panel "   Result Value Ref Range    Glucose 113 (H) 74 - 99 mg/dL    Sodium 136 136 - 145 mmol/L    Potassium 3.7 3.5 - 5.3 mmol/L    Chloride 103 98 - 107 mmol/L    Bicarbonate 26 21 - 32 mmol/L    Anion Gap 11 10 - 20 mmol/L    Urea Nitrogen 11 6 - 23 mg/dL    Creatinine 0.87 0.50 - 1.30 mg/dL    eGFR >90 >60 mL/min/1.73m*2    Calcium 9.1 8.6 - 10.3 mg/dL    Phosphorus 3.6 2.5 - 4.9 mg/dL    Albumin 3.4 3.4 - 5.0 g/dL   Magnesium   Result Value Ref Range    Magnesium 2.09 1.60 - 2.40 mg/dL   CBC   Result Value Ref Range    WBC 5.6 4.4 - 11.3 x10*3/uL    nRBC 0.0 0.0 - 0.0 /100 WBCs    RBC 3.91 (L) 4.50 - 5.90 x10*6/uL    Hemoglobin 13.6 13.5 - 17.5 g/dL    Hematocrit 40.1 (L) 41.0 - 52.0 %     (H) 80 - 100 fL    MCH 34.8 (H) 26.0 - 34.0 pg    MCHC 33.9 32.0 - 36.0 g/dL    RDW 12.9 11.5 - 14.5 %    Platelets 180 150 - 450 x10*3/uL              Assessment/Plan   Principal Problem:    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            Blu Rodriguez MD

## 2024-02-04 NOTE — PROGRESS NOTES
SW on call/remote.  Received referral for CD resources.  Discussed CD resources with pt.  Explained services of [facility] and peer group support services and benefits.  Pt appears to be receptive.  Offered to make referral today so pt can have an onsite visit however, pt preferred to receive pamphlet and make his own call.  Mr. Araujo feels that he is going through withdrawal and is not ready to be discharged.  At this time pt states he was just medicated and wanted to rest and go back to sleep.  TCC, Sherry Chicas RN to hand deliver pamphlet for [facility] today.

## 2024-02-05 LAB
ALBUMIN SERPL BCP-MCNC: 3.4 G/DL (ref 3.4–5)
ANION GAP SERPL CALC-SCNC: 12 MMOL/L (ref 10–20)
BUN SERPL-MCNC: 12 MG/DL (ref 6–23)
CALCIUM SERPL-MCNC: 9.4 MG/DL (ref 8.6–10.3)
CHLORIDE SERPL-SCNC: 101 MMOL/L (ref 98–107)
CO2 SERPL-SCNC: 27 MMOL/L (ref 21–32)
CREAT SERPL-MCNC: 0.9 MG/DL (ref 0.5–1.3)
EGFRCR SERPLBLD CKD-EPI 2021: >90 ML/MIN/1.73M*2
ERYTHROCYTE [DISTWIDTH] IN BLOOD BY AUTOMATED COUNT: 12.8 % (ref 11.5–14.5)
GLUCOSE SERPL-MCNC: 108 MG/DL (ref 74–99)
HCT VFR BLD AUTO: 42.3 % (ref 41–52)
HGB BLD-MCNC: 14.5 G/DL (ref 13.5–17.5)
MAGNESIUM SERPL-MCNC: 2.04 MG/DL (ref 1.6–2.4)
MCH RBC QN AUTO: 34.8 PG (ref 26–34)
MCHC RBC AUTO-ENTMCNC: 34.3 G/DL (ref 32–36)
MCV RBC AUTO: 101 FL (ref 80–100)
NRBC BLD-RTO: 0 /100 WBCS (ref 0–0)
PHOSPHATE SERPL-MCNC: 3.8 MG/DL (ref 2.5–4.9)
PLATELET # BLD AUTO: 199 X10*3/UL (ref 150–450)
POTASSIUM SERPL-SCNC: 3.7 MMOL/L (ref 3.5–5.3)
RBC # BLD AUTO: 4.17 X10*6/UL (ref 4.5–5.9)
SODIUM SERPL-SCNC: 136 MMOL/L (ref 136–145)
WBC # BLD AUTO: 6 X10*3/UL (ref 4.4–11.3)

## 2024-02-05 PROCEDURE — 2500000002 HC RX 250 W HCPCS SELF ADMINISTERED DRUGS (ALT 637 FOR MEDICARE OP, ALT 636 FOR OP/ED): Performed by: PHYSICIAN ASSISTANT

## 2024-02-05 PROCEDURE — 2500000001 HC RX 250 WO HCPCS SELF ADMINISTERED DRUGS (ALT 637 FOR MEDICARE OP): Performed by: PHYSICIAN ASSISTANT

## 2024-02-05 PROCEDURE — 1200000002 HC GENERAL ROOM WITH TELEMETRY DAILY

## 2024-02-05 PROCEDURE — 2500000002 HC RX 250 W HCPCS SELF ADMINISTERED DRUGS (ALT 637 FOR MEDICARE OP, ALT 636 FOR OP/ED): Performed by: NURSE PRACTITIONER

## 2024-02-05 PROCEDURE — 2500000001 HC RX 250 WO HCPCS SELF ADMINISTERED DRUGS (ALT 637 FOR MEDICARE OP): Performed by: PSYCHIATRY & NEUROLOGY

## 2024-02-05 PROCEDURE — S4991 NICOTINE PATCH NONLEGEND: HCPCS | Performed by: PHYSICIAN ASSISTANT

## 2024-02-05 PROCEDURE — 36415 COLL VENOUS BLD VENIPUNCTURE: CPT | Performed by: PHYSICIAN ASSISTANT

## 2024-02-05 PROCEDURE — 83735 ASSAY OF MAGNESIUM: CPT | Performed by: PHYSICIAN ASSISTANT

## 2024-02-05 PROCEDURE — 80069 RENAL FUNCTION PANEL: CPT | Performed by: PHYSICIAN ASSISTANT

## 2024-02-05 PROCEDURE — 85027 COMPLETE CBC AUTOMATED: CPT | Performed by: PHYSICIAN ASSISTANT

## 2024-02-05 PROCEDURE — 99232 SBSQ HOSP IP/OBS MODERATE 35: CPT | Performed by: PSYCHIATRY & NEUROLOGY

## 2024-02-05 PROCEDURE — 2500000004 HC RX 250 GENERAL PHARMACY W/ HCPCS (ALT 636 FOR OP/ED): Performed by: PHYSICIAN ASSISTANT

## 2024-02-05 RX ADMIN — LORAZEPAM 1 MG: 1 TABLET ORAL at 10:16

## 2024-02-05 RX ADMIN — LORAZEPAM 1 MG: 1 TABLET ORAL at 08:23

## 2024-02-05 RX ADMIN — LORAZEPAM 2 MG: 1 TABLET ORAL at 04:15

## 2024-02-05 RX ADMIN — CHLORDIAZEPOXIDE HYDROCHLORIDE 25 MG: 25 CAPSULE ORAL at 08:30

## 2024-02-05 RX ADMIN — GABAPENTIN 600 MG: 600 TABLET, FILM COATED ORAL at 08:20

## 2024-02-05 RX ADMIN — NICOTINE 1 PATCH: 14 PATCH, EXTENDED RELEASE TRANSDERMAL at 08:21

## 2024-02-05 RX ADMIN — LORAZEPAM 1 MG: 1 TABLET ORAL at 12:35

## 2024-02-05 RX ADMIN — ACETAMINOPHEN 650 MG: 325 TABLET ORAL at 04:14

## 2024-02-05 RX ADMIN — LORAZEPAM 2 MG: 1 TABLET ORAL at 00:13

## 2024-02-05 RX ADMIN — ENOXAPARIN SODIUM 40 MG: 40 INJECTION SUBCUTANEOUS at 20:42

## 2024-02-05 RX ADMIN — GABAPENTIN 600 MG: 600 TABLET, FILM COATED ORAL at 20:42

## 2024-02-05 RX ADMIN — Medication 1 TABLET: at 08:20

## 2024-02-05 RX ADMIN — CHLORDIAZEPOXIDE HYDROCHLORIDE 25 MG: 25 CAPSULE ORAL at 00:13

## 2024-02-05 RX ADMIN — CHLORDIAZEPOXIDE HYDROCHLORIDE 25 MG: 25 CAPSULE ORAL at 16:30

## 2024-02-05 RX ADMIN — PANTOPRAZOLE SODIUM 40 MG: 40 TABLET, DELAYED RELEASE ORAL at 06:22

## 2024-02-05 RX ADMIN — DIVALPROEX SODIUM 750 MG: 500 TABLET, FILM COATED, EXTENDED RELEASE ORAL at 08:22

## 2024-02-05 RX ADMIN — VENLAFAXINE HYDROCHLORIDE 150 MG: 75 CAPSULE, EXTENDED RELEASE ORAL at 08:20

## 2024-02-05 RX ADMIN — ACETAMINOPHEN 650 MG: 325 TABLET ORAL at 08:19

## 2024-02-05 RX ADMIN — FOLIC ACID 1 MG: 1 TABLET ORAL at 08:22

## 2024-02-05 RX ADMIN — LORAZEPAM 1 MG: 1 TABLET ORAL at 14:13

## 2024-02-05 RX ADMIN — GABAPENTIN 600 MG: 600 TABLET, FILM COATED ORAL at 14:13

## 2024-02-05 RX ADMIN — CHLORDIAZEPOXIDE HYDROCHLORIDE 25 MG: 25 CAPSULE ORAL at 23:53

## 2024-02-05 RX ADMIN — THIAMINE HCL TAB 100 MG 100 MG: 100 TAB at 08:20

## 2024-02-05 RX ADMIN — LORAZEPAM 1 MG: 1 TABLET ORAL at 22:16

## 2024-02-05 RX ADMIN — LORAZEPAM 2 MG: 1 TABLET ORAL at 06:22

## 2024-02-05 RX ADMIN — LORAZEPAM 1 MG: 1 TABLET ORAL at 20:42

## 2024-02-05 RX ADMIN — LORAZEPAM 1 MG: 1 TABLET ORAL at 02:20

## 2024-02-05 RX ADMIN — BUPRENORPHINE AND NALOXONE 2 TABLET: 8; 2 TABLET SUBLINGUAL at 08:20

## 2024-02-05 RX ADMIN — LORAZEPAM 1 MG: 1 TABLET ORAL at 16:30

## 2024-02-05 RX ADMIN — LORAZEPAM 1 MG: 1 TABLET ORAL at 18:14

## 2024-02-05 ASSESSMENT — LIFESTYLE VARIABLES
HEADACHE, FULLNESS IN HEAD: VERY MILD
PAROXYSMAL SWEATS: BARELY PERCEPTIBLE SWEATING, PALMS MOIST
PAROXYSMAL SWEATS: BARELY PERCEPTIBLE SWEATING, PALMS MOIST
VISUAL DISTURBANCES: VERY MILD SENSITIVITY
PAROXYSMAL SWEATS: BARELY PERCEPTIBLE SWEATING, PALMS MOIST
AGITATION: NORMAL ACTIVITY
ANXIETY: MILDLY ANXIOUS
ORIENTATION AND CLOUDING OF SENSORIUM: ORIENTED AND CAN DO SERIAL ADDITIONS
NAUSEA AND VOMITING: MILD NAUSEA WITH NO VOMITING
AUDITORY DISTURBANCES: VERY MILD HARSHNESS OR ABILITY TO FRIGHTEN
AGITATION: NORMAL ACTIVITY
AUDITORY DISTURBANCES: VERY MILD HARSHNESS OR ABILITY TO FRIGHTEN
ANXIETY: 2
TREMOR: 2
TACTILE DISTURBANCES: VERY MILD ITCHING, PINS AND NEEDLES, BURNING OR NUMBNESS
NAUSEA AND VOMITING: NO NAUSEA AND NO VOMITING
TREMOR: NOT VISIBLE, BUT CAN BE FELT FINGERTIP TO FINGERTIP
ORIENTATION AND CLOUDING OF SENSORIUM: ORIENTED AND CAN DO SERIAL ADDITIONS
VISUAL DISTURBANCES: VERY MILD SENSITIVITY
TACTILE DISTURBANCES: VERY MILD ITCHING, PINS AND NEEDLES, BURNING OR NUMBNESS
AGITATION: NORMAL ACTIVITY
TACTILE DISTURBANCES: VERY MILD ITCHING, PINS AND NEEDLES, BURNING OR NUMBNESS
HEADACHE, FULLNESS IN HEAD: MILD
ANXIETY: 2
ORIENTATION AND CLOUDING OF SENSORIUM: ORIENTED AND CAN DO SERIAL ADDITIONS
ANXIETY: MILDLY ANXIOUS
TOTAL SCORE: 9
VISUAL DISTURBANCES: VERY MILD SENSITIVITY
VISUAL DISTURBANCES: NOT PRESENT
ANXIETY: 2
ORIENTATION AND CLOUDING OF SENSORIUM: ORIENTED AND CAN DO SERIAL ADDITIONS
TREMOR: NOT VISIBLE, BUT CAN BE FELT FINGERTIP TO FINGERTIP
TACTILE DISTURBANCES: VERY MILD ITCHING, PINS AND NEEDLES, BURNING OR NUMBNESS
NAUSEA AND VOMITING: 2
TOTAL SCORE: 9
VISUAL DISTURBANCES: VERY MILD SENSITIVITY
NAUSEA AND VOMITING: NO NAUSEA AND NO VOMITING
NAUSEA AND VOMITING: MILD NAUSEA WITH NO VOMITING
TREMOR: 2
TOTAL SCORE: 8
NAUSEA AND VOMITING: NO NAUSEA AND NO VOMITING
TOTAL SCORE: 11
TOTAL SCORE: 8
TREMOR: NOT VISIBLE, BUT CAN BE FELT FINGERTIP TO FINGERTIP
VISUAL DISTURBANCES: VERY MILD SENSITIVITY
TREMOR: NOT VISIBLE, BUT CAN BE FELT FINGERTIP TO FINGERTIP
VISUAL DISTURBANCES: VERY MILD SENSITIVITY
ORIENTATION AND CLOUDING OF SENSORIUM: ORIENTED AND CAN DO SERIAL ADDITIONS
TACTILE DISTURBANCES: VERY MILD ITCHING, PINS AND NEEDLES, BURNING OR NUMBNESS
TACTILE DISTURBANCES: VERY MILD ITCHING, PINS AND NEEDLES, BURNING OR NUMBNESS
HEADACHE, FULLNESS IN HEAD: MILD
HEADACHE, FULLNESS IN HEAD: MILD
TACTILE DISTURBANCES: VERY MILD ITCHING, PINS AND NEEDLES, BURNING OR NUMBNESS
ANXIETY: MILDLY ANXIOUS
PAROXYSMAL SWEATS: 2
AUDITORY DISTURBANCES: VERY MILD HARSHNESS OR ABILITY TO FRIGHTEN
TREMOR: NOT VISIBLE, BUT CAN BE FELT FINGERTIP TO FINGERTIP
TREMOR: NOT VISIBLE, BUT CAN BE FELT FINGERTIP TO FINGERTIP
PAROXYSMAL SWEATS: 2
ANXIETY: MILDLY ANXIOUS
AGITATION: NORMAL ACTIVITY
VISUAL DISTURBANCES: VERY MILD SENSITIVITY
HEADACHE, FULLNESS IN HEAD: VERY MILD
ORIENTATION AND CLOUDING OF SENSORIUM: ORIENTED AND CAN DO SERIAL ADDITIONS
ANXIETY: MILDLY ANXIOUS
HEADACHE, FULLNESS IN HEAD: VERY MILD
ORIENTATION AND CLOUDING OF SENSORIUM: ORIENTED AND CAN DO SERIAL ADDITIONS
VISUAL DISTURBANCES: VERY MILD SENSITIVITY
ANXIETY: 2
TREMOR: NOT VISIBLE, BUT CAN BE FELT FINGERTIP TO FINGERTIP
VISUAL DISTURBANCES: VERY MILD SENSITIVITY
ANXIETY: MILDLY ANXIOUS
TACTILE DISTURBANCES: VERY MILD ITCHING, PINS AND NEEDLES, BURNING OR NUMBNESS
AUDITORY DISTURBANCES: VERY MILD HARSHNESS OR ABILITY TO FRIGHTEN
TREMOR: NOT VISIBLE, BUT CAN BE FELT FINGERTIP TO FINGERTIP
AGITATION: SOMEWHAT MORE THAN NORMAL ACTIVITY
PAROXYSMAL SWEATS: BARELY PERCEPTIBLE SWEATING, PALMS MOIST
TOTAL SCORE: 8
NAUSEA AND VOMITING: NO NAUSEA AND NO VOMITING
PAROXYSMAL SWEATS: 2
AGITATION: NORMAL ACTIVITY
NAUSEA AND VOMITING: MILD NAUSEA WITH NO VOMITING
HEADACHE, FULLNESS IN HEAD: MILD
AUDITORY DISTURBANCES: VERY MILD HARSHNESS OR ABILITY TO FRIGHTEN
ORIENTATION AND CLOUDING OF SENSORIUM: ORIENTED AND CAN DO SERIAL ADDITIONS
AUDITORY DISTURBANCES: VERY MILD HARSHNESS OR ABILITY TO FRIGHTEN
HEADACHE, FULLNESS IN HEAD: VERY MILD
ANXIETY: MILDLY ANXIOUS
TACTILE DISTURBANCES: VERY MILD ITCHING, PINS AND NEEDLES, BURNING OR NUMBNESS
AGITATION: NORMAL ACTIVITY
TREMOR: NOT VISIBLE, BUT CAN BE FELT FINGERTIP TO FINGERTIP
AUDITORY DISTURBANCES: NOT PRESENT
AGITATION: NORMAL ACTIVITY
VISUAL DISTURBANCES: VERY MILD SENSITIVITY
TOTAL SCORE: 9
HEADACHE, FULLNESS IN HEAD: VERY MILD
TACTILE DISTURBANCES: VERY MILD ITCHING, PINS AND NEEDLES, BURNING OR NUMBNESS
TOTAL SCORE: 8
TOTAL SCORE: 9
NAUSEA AND VOMITING: MILD NAUSEA WITH NO VOMITING
AGITATION: NORMAL ACTIVITY
NAUSEA AND VOMITING: NO NAUSEA AND NO VOMITING
ORIENTATION AND CLOUDING OF SENSORIUM: ORIENTED AND CAN DO SERIAL ADDITIONS
AUDITORY DISTURBANCES: VERY MILD HARSHNESS OR ABILITY TO FRIGHTEN
AUDITORY DISTURBANCES: VERY MILD HARSHNESS OR ABILITY TO FRIGHTEN
HEADACHE, FULLNESS IN HEAD: VERY MILD
AUDITORY DISTURBANCES: VERY MILD HARSHNESS OR ABILITY TO FRIGHTEN
ORIENTATION AND CLOUDING OF SENSORIUM: ORIENTED AND CAN DO SERIAL ADDITIONS
PAROXYSMAL SWEATS: 2
TREMOR: NOT VISIBLE, BUT CAN BE FELT FINGERTIP TO FINGERTIP
ORIENTATION AND CLOUDING OF SENSORIUM: ORIENTED AND CAN DO SERIAL ADDITIONS
PAROXYSMAL SWEATS: 2
HEADACHE, FULLNESS IN HEAD: MILD
AGITATION: NORMAL ACTIVITY
PAROXYSMAL SWEATS: BARELY PERCEPTIBLE SWEATING, PALMS MOIST
AGITATION: NORMAL ACTIVITY
AGITATION: NORMAL ACTIVITY
PAROXYSMAL SWEATS: 2
ORIENTATION AND CLOUDING OF SENSORIUM: ORIENTED AND CAN DO SERIAL ADDITIONS
HEADACHE, FULLNESS IN HEAD: VERY MILD
AUDITORY DISTURBANCES: NOT PRESENT
NAUSEA AND VOMITING: MILD NAUSEA WITH NO VOMITING
NAUSEA AND VOMITING: 2
PAROXYSMAL SWEATS: 2
TOTAL SCORE: 9
AUDITORY DISTURBANCES: VERY MILD HARSHNESS OR ABILITY TO FRIGHTEN
VISUAL DISTURBANCES: NOT PRESENT
TOTAL SCORE: 10
TOTAL SCORE: 9
ANXIETY: 2

## 2024-02-05 ASSESSMENT — COGNITIVE AND FUNCTIONAL STATUS - GENERAL
DAILY ACTIVITIY SCORE: 24
MOBILITY SCORE: 22
CLIMB 3 TO 5 STEPS WITH RAILING: A LITTLE
MOBILITY SCORE: 24
WALKING IN HOSPITAL ROOM: A LITTLE

## 2024-02-05 ASSESSMENT — PAIN SCALES - GENERAL
PAINLEVEL_OUTOF10: 2
PAINLEVEL_OUTOF10: 1
PAINLEVEL_OUTOF10: 4
PAINLEVEL_OUTOF10: 5 - MODERATE PAIN
PAINLEVEL_OUTOF10: 0 - NO PAIN
PAINLEVEL_OUTOF10: 0 - NO PAIN

## 2024-02-05 ASSESSMENT — PAIN DESCRIPTION - LOCATION: LOCATION: HEAD

## 2024-02-05 ASSESSMENT — PAIN - FUNCTIONAL ASSESSMENT
PAIN_FUNCTIONAL_ASSESSMENT: 0-10

## 2024-02-05 ASSESSMENT — PAIN DESCRIPTION - DESCRIPTORS: DESCRIPTORS: ACHING

## 2024-02-05 NOTE — NURSING NOTE
End of shift note -   Pt stable this shift. He has continued on Q 2 CIWA and been medicated every 2 hours per scoring. Pt remains able to voice his needs. He has been anxious but agitation has been minimal this shift. Pt educated on meds etc and has no further questions at this time. No plan for discharge yet.

## 2024-02-05 NOTE — CARE PLAN
The patient's goals for the shift include      The clinical goals for the shift include See POC    Pt stable this shift. See flowsheets for more info.

## 2024-02-05 NOTE — PROGRESS NOTES
"Deyvi Araujo is a 43 y.o. male on day 3 of admission presenting with Benzodiazepine withdrawal with perceptual disturbance (CMS/HCC).    Subjective   Patient continues to need as needed Xanax on top of the Librium.  He is feeling better.  Informed patient to use Ativan only as needed for extreme anxiety or withdrawal.  Patient is agreeable to inpatient rehab       Objective   General: Alert and oriented x 3  Appearance: Appears stated age  Attitude: Cooperative but restless  Behavior: Fidgety  Motor Activity: Normal  Speech: Normal rate and rhythm coherent  Mood: Anxious but not depressed  Affect: Anxious  Thought Process: No formal thought disorder  Thought Content: No delusions or suicidal  Thought Perception: No audiovisual hallucinations  Cognition: Intact  Insight: Fair      Physical Exam    Last Recorded Vitals  Blood pressure (!) 141/93, pulse 74, temperature 36.6 °C (97.9 °F), temperature source Temporal, resp. rate 18, height 1.88 m (6' 2\"), weight 130 kg (286 lb 6 oz), SpO2 95 %.              Assessment/Plan   Principal Problem:    Benzodiazepine withdrawal with perceptual disturbance (CMS/HCC)    Patient will benefit from rehabilitation treatment at the facility.  He is agreeable to residential treatment plan.  Referral to [facility] to start looking into a place for rehabilitation  Continue to follow during the stay in the hospital       Brown Babb MD      "

## 2024-02-05 NOTE — NURSING NOTE
Pt remains Q2 hour CIWAs. Pt is on day 3 of withdrawal and scores have been elevated. Pt has been receiving ativan this shift every 2 hours due to CIWA scores. Pt also received tylenol for headache X2 this shift and zofran for nausea X2 this shift. Safety maintained.

## 2024-02-06 LAB
ALBUMIN SERPL BCP-MCNC: 3.8 G/DL (ref 3.4–5)
ANION GAP SERPL CALC-SCNC: 11 MMOL/L (ref 10–20)
BUN SERPL-MCNC: 11 MG/DL (ref 6–23)
CALCIUM SERPL-MCNC: 9.6 MG/DL (ref 8.6–10.3)
CHLORIDE SERPL-SCNC: 101 MMOL/L (ref 98–107)
CO2 SERPL-SCNC: 27 MMOL/L (ref 21–32)
CREAT SERPL-MCNC: 0.99 MG/DL (ref 0.5–1.3)
EGFRCR SERPLBLD CKD-EPI 2021: >90 ML/MIN/1.73M*2
ERYTHROCYTE [DISTWIDTH] IN BLOOD BY AUTOMATED COUNT: 12.8 % (ref 11.5–14.5)
GLUCOSE SERPL-MCNC: 106 MG/DL (ref 74–99)
HCT VFR BLD AUTO: 44.1 % (ref 41–52)
HGB BLD-MCNC: 15.2 G/DL (ref 13.5–17.5)
MAGNESIUM SERPL-MCNC: 2.02 MG/DL (ref 1.6–2.4)
MCH RBC QN AUTO: 34.9 PG (ref 26–34)
MCHC RBC AUTO-ENTMCNC: 34.5 G/DL (ref 32–36)
MCV RBC AUTO: 101 FL (ref 80–100)
NRBC BLD-RTO: 0 /100 WBCS (ref 0–0)
PHOSPHATE SERPL-MCNC: 3.7 MG/DL (ref 2.5–4.9)
PLATELET # BLD AUTO: 226 X10*3/UL (ref 150–450)
POTASSIUM SERPL-SCNC: 3.9 MMOL/L (ref 3.5–5.3)
RBC # BLD AUTO: 4.35 X10*6/UL (ref 4.5–5.9)
SODIUM SERPL-SCNC: 135 MMOL/L (ref 136–145)
WBC # BLD AUTO: 6.9 X10*3/UL (ref 4.4–11.3)

## 2024-02-06 PROCEDURE — 83735 ASSAY OF MAGNESIUM: CPT | Performed by: PHYSICIAN ASSISTANT

## 2024-02-06 PROCEDURE — 2500000002 HC RX 250 W HCPCS SELF ADMINISTERED DRUGS (ALT 637 FOR MEDICARE OP, ALT 636 FOR OP/ED): Performed by: PHYSICIAN ASSISTANT

## 2024-02-06 PROCEDURE — 1200000002 HC GENERAL ROOM WITH TELEMETRY DAILY

## 2024-02-06 PROCEDURE — 2500000002 HC RX 250 W HCPCS SELF ADMINISTERED DRUGS (ALT 637 FOR MEDICARE OP, ALT 636 FOR OP/ED): Performed by: NURSE PRACTITIONER

## 2024-02-06 PROCEDURE — 36415 COLL VENOUS BLD VENIPUNCTURE: CPT | Performed by: PHYSICIAN ASSISTANT

## 2024-02-06 PROCEDURE — 2500000001 HC RX 250 WO HCPCS SELF ADMINISTERED DRUGS (ALT 637 FOR MEDICARE OP): Performed by: PSYCHIATRY & NEUROLOGY

## 2024-02-06 PROCEDURE — S4991 NICOTINE PATCH NONLEGEND: HCPCS | Performed by: PHYSICIAN ASSISTANT

## 2024-02-06 PROCEDURE — 85027 COMPLETE CBC AUTOMATED: CPT | Performed by: PHYSICIAN ASSISTANT

## 2024-02-06 PROCEDURE — 99232 SBSQ HOSP IP/OBS MODERATE 35: CPT | Performed by: PSYCHIATRY & NEUROLOGY

## 2024-02-06 PROCEDURE — 80069 RENAL FUNCTION PANEL: CPT | Performed by: PHYSICIAN ASSISTANT

## 2024-02-06 PROCEDURE — 2500000004 HC RX 250 GENERAL PHARMACY W/ HCPCS (ALT 636 FOR OP/ED): Performed by: PHYSICIAN ASSISTANT

## 2024-02-06 PROCEDURE — 2500000001 HC RX 250 WO HCPCS SELF ADMINISTERED DRUGS (ALT 637 FOR MEDICARE OP): Performed by: PHYSICIAN ASSISTANT

## 2024-02-06 RX ORDER — CHLORDIAZEPOXIDE HYDROCHLORIDE 25 MG/1
25 CAPSULE, GELATIN COATED ORAL EVERY 8 HOURS
Qty: 3 CAPSULE | Refills: 0 | Status: SHIPPED | OUTPATIENT
Start: 2024-02-06 | End: 2024-02-07

## 2024-02-06 RX ORDER — CHLORDIAZEPOXIDE HYDROCHLORIDE 25 MG/1
25 CAPSULE, GELATIN COATED ORAL EVERY 12 HOURS
Qty: 4 CAPSULE | Refills: 0 | Status: SHIPPED | OUTPATIENT
Start: 2024-02-07 | End: 2024-02-09

## 2024-02-06 RX ADMIN — LORAZEPAM 0.5 MG: 0.5 TABLET ORAL at 22:30

## 2024-02-06 RX ADMIN — BUPRENORPHINE AND NALOXONE 2 TABLET: 8; 2 TABLET SUBLINGUAL at 08:34

## 2024-02-06 RX ADMIN — NICOTINE 1 PATCH: 14 PATCH, EXTENDED RELEASE TRANSDERMAL at 08:37

## 2024-02-06 RX ADMIN — Medication 1 TABLET: at 08:35

## 2024-02-06 RX ADMIN — LORAZEPAM 1 MG: 1 TABLET ORAL at 20:31

## 2024-02-06 RX ADMIN — LORAZEPAM 2 MG: 1 TABLET ORAL at 14:46

## 2024-02-06 RX ADMIN — LORAZEPAM 1 MG: 1 TABLET ORAL at 08:40

## 2024-02-06 RX ADMIN — LORAZEPAM 0.5 MG: 0.5 TABLET ORAL at 18:15

## 2024-02-06 RX ADMIN — CHLORDIAZEPOXIDE HYDROCHLORIDE 25 MG: 25 CAPSULE ORAL at 16:25

## 2024-02-06 RX ADMIN — PANTOPRAZOLE SODIUM 40 MG: 40 TABLET, DELAYED RELEASE ORAL at 05:44

## 2024-02-06 RX ADMIN — CHLORDIAZEPOXIDE HYDROCHLORIDE 25 MG: 25 CAPSULE ORAL at 08:35

## 2024-02-06 RX ADMIN — THIAMINE HCL TAB 100 MG 100 MG: 100 TAB at 08:35

## 2024-02-06 RX ADMIN — GABAPENTIN 600 MG: 600 TABLET, FILM COATED ORAL at 20:31

## 2024-02-06 RX ADMIN — VENLAFAXINE HYDROCHLORIDE 150 MG: 75 CAPSULE, EXTENDED RELEASE ORAL at 08:34

## 2024-02-06 RX ADMIN — LORAZEPAM 1 MG: 1 TABLET ORAL at 12:01

## 2024-02-06 RX ADMIN — ENOXAPARIN SODIUM 40 MG: 40 INJECTION SUBCUTANEOUS at 20:31

## 2024-02-06 RX ADMIN — ACETAMINOPHEN 650 MG: 325 TABLET ORAL at 08:33

## 2024-02-06 RX ADMIN — GABAPENTIN 600 MG: 600 TABLET, FILM COATED ORAL at 08:35

## 2024-02-06 RX ADMIN — DIVALPROEX SODIUM 750 MG: 500 TABLET, FILM COATED, EXTENDED RELEASE ORAL at 08:34

## 2024-02-06 RX ADMIN — LORAZEPAM 1 MG: 1 TABLET ORAL at 04:14

## 2024-02-06 RX ADMIN — FOLIC ACID 1 MG: 1 TABLET ORAL at 08:34

## 2024-02-06 RX ADMIN — LORAZEPAM 1 MG: 1 TABLET ORAL at 06:13

## 2024-02-06 RX ADMIN — GABAPENTIN 600 MG: 600 TABLET, FILM COATED ORAL at 14:46

## 2024-02-06 ASSESSMENT — LIFESTYLE VARIABLES
AGITATION: NORMAL ACTIVITY
VISUAL DISTURBANCES: NOT PRESENT
ANXIETY: 3
AUDITORY DISTURBANCES: NOT PRESENT
HEADACHE, FULLNESS IN HEAD: MILD
ORIENTATION AND CLOUDING OF SENSORIUM: ORIENTED AND CAN DO SERIAL ADDITIONS
ORIENTATION AND CLOUDING OF SENSORIUM: ORIENTED AND CAN DO SERIAL ADDITIONS
TREMOR: NOT VISIBLE, BUT CAN BE FELT FINGERTIP TO FINGERTIP
ANXIETY: 2
AGITATION: SOMEWHAT MORE THAN NORMAL ACTIVITY
VISUAL DISTURBANCES: NOT PRESENT
NAUSEA AND VOMITING: MILD NAUSEA WITH NO VOMITING
AUDITORY DISTURBANCES: NOT PRESENT
PAROXYSMAL SWEATS: BARELY PERCEPTIBLE SWEATING, PALMS MOIST
AGITATION: SOMEWHAT MORE THAN NORMAL ACTIVITY
TOTAL SCORE: 5
TOTAL SCORE: 8
ANXIETY: 3
PAROXYSMAL SWEATS: 2
AUDITORY DISTURBANCES: NOT PRESENT
TOTAL SCORE: 8
VISUAL DISTURBANCES: NOT PRESENT
AGITATION: NORMAL ACTIVITY
PAROXYSMAL SWEATS: BARELY PERCEPTIBLE SWEATING, PALMS MOIST
ORIENTATION AND CLOUDING OF SENSORIUM: ORIENTED AND CAN DO SERIAL ADDITIONS
NAUSEA AND VOMITING: NO NAUSEA AND NO VOMITING
AGITATION: NORMAL ACTIVITY
ORIENTATION AND CLOUDING OF SENSORIUM: ORIENTED AND CAN DO SERIAL ADDITIONS
NAUSEA AND VOMITING: NO NAUSEA AND NO VOMITING
PAROXYSMAL SWEATS: 2
AGITATION: NORMAL ACTIVITY
TREMOR: NOT VISIBLE, BUT CAN BE FELT FINGERTIP TO FINGERTIP
HEADACHE, FULLNESS IN HEAD: NOT PRESENT
ORIENTATION AND CLOUDING OF SENSORIUM: ORIENTED AND CAN DO SERIAL ADDITIONS
HEADACHE, FULLNESS IN HEAD: NOT PRESENT
HEADACHE, FULLNESS IN HEAD: MODERATE
TOTAL SCORE: 8
ANXIETY: 3
AUDITORY DISTURBANCES: NOT PRESENT
TREMOR: NOT VISIBLE, BUT CAN BE FELT FINGERTIP TO FINGERTIP
TREMOR: 2
ORIENTATION AND CLOUDING OF SENSORIUM: ORIENTED AND CAN DO SERIAL ADDITIONS
BLOOD PRESSURE: 133/97
TOTAL SCORE: 10
VISUAL DISTURBANCES: NOT PRESENT
TACTILE DISTURBANCES: VERY MILD ITCHING, PINS AND NEEDLES, BURNING OR NUMBNESS
HEADACHE, FULLNESS IN HEAD: MILD
TREMOR: NOT VISIBLE, BUT CAN BE FELT FINGERTIP TO FINGERTIP
TREMOR: NO TREMOR
ORIENTATION AND CLOUDING OF SENSORIUM: ORIENTED AND CAN DO SERIAL ADDITIONS
TOTAL SCORE: 6
AUDITORY DISTURBANCES: NOT PRESENT
AGITATION: NORMAL ACTIVITY
NAUSEA AND VOMITING: NO NAUSEA AND NO VOMITING
VISUAL DISTURBANCES: NOT PRESENT
NAUSEA AND VOMITING: NO NAUSEA AND NO VOMITING
VISUAL DISTURBANCES: NOT PRESENT
VISUAL DISTURBANCES: NOT PRESENT
ORIENTATION AND CLOUDING OF SENSORIUM: ORIENTED AND CAN DO SERIAL ADDITIONS
PAROXYSMAL SWEATS: 2
HEADACHE, FULLNESS IN HEAD: MILD
TOTAL SCORE: 4
PULSE: 69
NAUSEA AND VOMITING: NO NAUSEA AND NO VOMITING
HEADACHE, FULLNESS IN HEAD: VERY MILD
VISUAL DISTURBANCES: NOT PRESENT
AUDITORY DISTURBANCES: NOT PRESENT
NAUSEA AND VOMITING: NO NAUSEA AND NO VOMITING
HEADACHE, FULLNESS IN HEAD: VERY MILD
TREMOR: NO TREMOR
AGITATION: NORMAL ACTIVITY
ANXIETY: 2
NAUSEA AND VOMITING: NO NAUSEA AND NO VOMITING
AUDITORY DISTURBANCES: NOT PRESENT
NAUSEA AND VOMITING: MILD NAUSEA WITH NO VOMITING
TREMOR: NOT VISIBLE, BUT CAN BE FELT FINGERTIP TO FINGERTIP
VISUAL DISTURBANCES: NOT PRESENT
NAUSEA AND VOMITING: MILD NAUSEA WITH NO VOMITING
TREMOR: NOT VISIBLE, BUT CAN BE FELT FINGERTIP TO FINGERTIP
TOTAL SCORE: 3
ANXIETY: 2
TREMOR: NO TREMOR
ORIENTATION AND CLOUDING OF SENSORIUM: ORIENTED AND CAN DO SERIAL ADDITIONS
ORIENTATION AND CLOUDING OF SENSORIUM: ORIENTED AND CAN DO SERIAL ADDITIONS
HEADACHE, FULLNESS IN HEAD: VERY MILD
PAROXYSMAL SWEATS: BARELY PERCEPTIBLE SWEATING, PALMS MOIST
AUDITORY DISTURBANCES: NOT PRESENT
BLOOD PRESSURE: 109/82
PAROXYSMAL SWEATS: 2
ANXIETY: MILDLY ANXIOUS
VISUAL DISTURBANCES: NOT PRESENT
ORIENTATION AND CLOUDING OF SENSORIUM: ORIENTED AND CAN DO SERIAL ADDITIONS
AGITATION: NORMAL ACTIVITY
TOTAL SCORE: 4
VISUAL DISTURBANCES: NOT PRESENT
TREMOR: NO TREMOR
TOTAL SCORE: 7
PAROXYSMAL SWEATS: BARELY PERCEPTIBLE SWEATING, PALMS MOIST
AGITATION: NORMAL ACTIVITY
TOTAL SCORE: 6
TREMOR: NOT VISIBLE, BUT CAN BE FELT FINGERTIP TO FINGERTIP
AGITATION: NORMAL ACTIVITY
PAROXYSMAL SWEATS: NO SWEAT VISIBLE
ANXIETY: 2
PULSE: 88
VISUAL DISTURBANCES: NOT PRESENT
TOTAL SCORE: 9
HEADACHE, FULLNESS IN HEAD: NOT PRESENT
ANXIETY: MODERATELY ANXIOUS, OR GUARDED, SO ANXIETY IS INFERRED
ANXIETY: 2
TACTILE DISTURBANCES: VERY MILD ITCHING, PINS AND NEEDLES, BURNING OR NUMBNESS
PAROXYSMAL SWEATS: 2
NAUSEA AND VOMITING: NO NAUSEA AND NO VOMITING
ANXIETY: MILDLY ANXIOUS
AGITATION: NORMAL ACTIVITY
HEADACHE, FULLNESS IN HEAD: MILD
HEADACHE, FULLNESS IN HEAD: MILD
NAUSEA AND VOMITING: NO NAUSEA AND NO VOMITING
AUDITORY DISTURBANCES: NOT PRESENT
AUDITORY DISTURBANCES: NOT PRESENT
ORIENTATION AND CLOUDING OF SENSORIUM: ORIENTED AND CAN DO SERIAL ADDITIONS
AUDITORY DISTURBANCES: NOT PRESENT
AUDITORY DISTURBANCES: NOT PRESENT
PAROXYSMAL SWEATS: 2
PAROXYSMAL SWEATS: 2
ANXIETY: 3

## 2024-02-06 ASSESSMENT — PAIN - FUNCTIONAL ASSESSMENT
PAIN_FUNCTIONAL_ASSESSMENT: 0-10

## 2024-02-06 ASSESSMENT — COGNITIVE AND FUNCTIONAL STATUS - GENERAL
MOBILITY SCORE: 22
CLIMB 3 TO 5 STEPS WITH RAILING: A LITTLE
WALKING IN HOSPITAL ROOM: A LITTLE
DAILY ACTIVITIY SCORE: 24

## 2024-02-06 ASSESSMENT — PAIN DESCRIPTION - LOCATION: LOCATION: HEAD

## 2024-02-06 ASSESSMENT — PAIN SCALES - GENERAL
PAINLEVEL_OUTOF10: 0 - NO PAIN
PAINLEVEL_OUTOF10: 4
PAINLEVEL_OUTOF10: 0 - NO PAIN
PAINLEVEL_OUTOF10: 8

## 2024-02-06 NOTE — PROGRESS NOTES
Deyvi Araujo is a 43 y.o. male on day 3 of admission presenting with Benzodiazepine withdrawal with perceptual disturbance (CMS/HCC).    Subjective   Feeling Jittery        Objective         Current Facility-Administered Medications:     acetaminophen (Tylenol) tablet 650 mg, 650 mg, oral, q4h PRN, 650 mg at 02/05/24 0819 **OR** acetaminophen (Tylenol) oral liquid 650 mg, 650 mg, oral, q4h PRN **OR** acetaminophen (Tylenol) suppository 650 mg, 650 mg, rectal, q4h PRN, oNrman Soto PA-C    bisacodyl (Dulcolax) EC tablet 10 mg, 10 mg, oral, Daily PRN, Norman Soto PA-C    buprenorphine-naloxone (Suboxone) 8-2 mg per SL tablet 2 tablet, 2 tablet, sublingual, Daily, Shonda Fernandez, APRN-CNP, 2 tablet at 02/05/24 0820    [COMPLETED] chlordiazePOXIDE (Librium) capsule 25 mg, 25 mg, oral, q6h, 25 mg at 02/04/24 1809 **FOLLOWED BY** chlordiazePOXIDE (Librium) capsule 25 mg, 25 mg, oral, q8h, 25 mg at 02/05/24 1630 **FOLLOWED BY** [START ON 2/7/2024] chlordiazePOXIDE (Librium) capsule 25 mg, 25 mg, oral, q12h, Brown Babb MD    divalproex (Depakote ER) 24 hr tablet 750 mg, 750 mg, oral, Daily, Brown Babb MD, 750 mg at 02/05/24 0822    enoxaparin (Lovenox) syringe 40 mg, 40 mg, subcutaneous, q24h, Norman Soto PA-C, 40 mg at 02/04/24 2016    folic acid (Folvite) tablet 1 mg, 1 mg, oral, Daily, Norman Soto PA-C, 1 mg at 02/05/24 0822    gabapentin (Neurontin) tablet 600 mg, 600 mg, oral, TID, Norman Soto PA-C, 600 mg at 02/05/24 1413    LORazepam (Ativan) tablet 0.5 mg, 0.5 mg, oral, q2h PRN, 0.5 mg at 02/04/24 0509 **OR** LORazepam (Ativan) tablet 1 mg, 1 mg, oral, q2h PRN, 1 mg at 02/05/24 1814 **OR** LORazepam (Ativan) tablet 2 mg, 2 mg, oral, q2h PRN, Brown Babb MD, 2 mg at 02/05/24 0622    multivitamin with minerals 1 tablet, 1 tablet, oral, Daily, Norman Soto PA-C, 1 tablet at 02/05/24 0820    nicotine (Nicoderm CQ) 14 mg/24 hr patch 1 patch, 1 patch,  "transdermal, Daily, 1 patch at 02/05/24 0821 **FOLLOWED BY** [START ON 3/15/2024] nicotine (Nicoderm CQ) 7 mg/24 hr patch 1 patch, 1 patch, transdermal, Daily, Norman Soto PA-C    ondansetron ODT (Zofran-ODT) disintegrating tablet 4 mg, 4 mg, oral, q8h PRN, 4 mg at 02/04/24 2016 **OR** ondansetron (Zofran) injection 4 mg, 4 mg, intravenous, q8h PRN, Norman Soto PA-C    pantoprazole (ProtoNix) EC tablet 40 mg, 40 mg, oral, Daily before breakfast, 40 mg at 02/05/24 0622 **OR** pantoprazole (ProtoNix) injection 40 mg, 40 mg, intravenous, Daily before breakfast, Norman Soto PA-C    sennosides-docusate sodium (Gladis-Colace) 8.6-50 mg per tablet 2 tablet, 2 tablet, oral, BID, Norman Soto PA-C    thiamine (Vitamin B-1) tablet 100 mg, 100 mg, oral, Daily, Norman Soto PA-C, 100 mg at 02/05/24 0820    venlafaxine XR (Effexor-XR) 24 hr capsule 150 mg, 150 mg, oral, Daily, Norman Soto PA-C, 150 mg at 02/05/24 0820       Physical Exam  HENT:      Head: Normocephalic.   Eyes:      Conjunctiva/sclera: Conjunctivae normal.   Cardiovascular:      Rate and Rhythm: Regular rhythm.   Pulmonary:      Breath sounds: Normal breath sounds.   Abdominal:      General: Bowel sounds are normal.      Palpations: Abdomen is soft.   Musculoskeletal:         General: Normal range of motion.   Skin:     General: Skin is warm and dry.   Neurological:      General: No focal deficit present.      Mental Status: He is alert.   Psychiatric:         Behavior: Behavior normal.           Last Recorded Vitals  Blood pressure (!) 140/94, pulse 83, temperature 36.1 °C (97 °F), temperature source Temporal, resp. rate 18, height 1.88 m (6' 2\"), weight 130 kg (286 lb 6 oz), SpO2 96 %.  Intake/Output last 3 Shifts:  I/O last 3 completed shifts:  In: 1040 (8 mL/kg) [P.O.:1040]  Out: - (0 mL/kg)   Weight: 129.9 kg     Labs:       Results for orders placed or performed during the hospital encounter of 02/02/24 (from the past 24 " hour(s))   Renal Function Panel   Result Value Ref Range    Glucose 108 (H) 74 - 99 mg/dL    Sodium 136 136 - 145 mmol/L    Potassium 3.7 3.5 - 5.3 mmol/L    Chloride 101 98 - 107 mmol/L    Bicarbonate 27 21 - 32 mmol/L    Anion Gap 12 10 - 20 mmol/L    Urea Nitrogen 12 6 - 23 mg/dL    Creatinine 0.90 0.50 - 1.30 mg/dL    eGFR >90 >60 mL/min/1.73m*2    Calcium 9.4 8.6 - 10.3 mg/dL    Phosphorus 3.8 2.5 - 4.9 mg/dL    Albumin 3.4 3.4 - 5.0 g/dL   Magnesium   Result Value Ref Range    Magnesium 2.04 1.60 - 2.40 mg/dL   CBC   Result Value Ref Range    WBC 6.0 4.4 - 11.3 x10*3/uL    nRBC 0.0 0.0 - 0.0 /100 WBCs    RBC 4.17 (L) 4.50 - 5.90 x10*6/uL    Hemoglobin 14.5 13.5 - 17.5 g/dL    Hematocrit 42.3 41.0 - 52.0 %     (H) 80 - 100 fL    MCH 34.8 (H) 26.0 - 34.0 pg    MCHC 34.3 32.0 - 36.0 g/dL    RDW 12.8 11.5 - 14.5 %    Platelets 199 150 - 450 x10*3/uL              Assessment/Plan   Principal Problem:    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         Blu Rodriguez MD

## 2024-02-06 NOTE — CARE PLAN
Problem: Daily Care  Goal: Daily care needs are met  Outcome: Progressing     Problem: Psychosocial Needs  Goal: Demonstrates ability to cope with hospitalization/illness  Outcome: Progressing     Problem: Safety - Adult  Goal: Free from fall injury  Outcome: Progressing     Problem: Discharge Planning  Goal: Discharge to home or other facility with appropriate resources  Outcome: Progressing    The patient's goals for the shift include  feel better and be less anxious    The clinical goals for the shift include maintain CIWA scores less than 10 throughout end of shift    Patient having uneventful night, continues to report high levels of anxiety. Patient requiring multiple doses of ativan due to subjective reports, however, patient appears to be relatively comfortable in bed. Blood pressure slightly elevated, otherwise, VSS. Patient ambulating steadily to the bathroom. Patient slightly sweaty, has tremors with fingertip touch, and reports nausea, headache, and anxiety. Patient encouraged to not take the ativan just because he is anxious, reviewed pysch note which also encourages dosing based on objective data more than subjective.

## 2024-02-06 NOTE — PROGRESS NOTES
Spoke with the pt today as the Dr indicates the pt wishes to go to an inpatient chemical dependency rehab program. He was given info on [facility] which is a peer support program that assists with finding treatment. He has not tried to call them yet because he is going through detox and has been sleeping a lot. Offered to call them on his behalf but he says that he will try to call them today. Explained that it might take some time for [facility] to find an inpt treatment program for him and encouraged him to call [facility] as soon as he can. They will come to meet him and will provide support as well.     1330 Per Dr, the pt now agrees to  go to [facility]. Met with the pt and, at first, he was reluctant  to go anywhere as he feels he is not ready for d/c because he is still detoxing. He wants to think about before making any decisions.     1600 Called [facility] and spoke with Ai at [phone]. They do have bed availability and she is happy to review his information if the pt wants to be considered for admission. Spoke with the pt again and he now agrees with a referral to [facility]. Faxed info to Admissions at [phone] for review.     Luis F Kruse

## 2024-02-06 NOTE — PROGRESS NOTES
Deyvi Araujo is a 43 y.o. male on day 4 of admission presenting with Benzodiazepine withdrawal with perceptual disturbance (CMS/HCC).    Subjective   Feeling restless       Objective         Current Facility-Administered Medications:     acetaminophen (Tylenol) tablet 650 mg, 650 mg, oral, q4h PRN, 650 mg at 02/06/24 0833 **OR** acetaminophen (Tylenol) oral liquid 650 mg, 650 mg, oral, q4h PRN **OR** acetaminophen (Tylenol) suppository 650 mg, 650 mg, rectal, q4h PRN, Norman Soto PA-C    bisacodyl (Dulcolax) EC tablet 10 mg, 10 mg, oral, Daily PRN, Norman Soto PA-C    buprenorphine-naloxone (Suboxone) 8-2 mg per SL tablet 2 tablet, 2 tablet, sublingual, Daily, Shonda Fernandez, APRN-CNP, 2 tablet at 02/06/24 0834    [COMPLETED] chlordiazePOXIDE (Librium) capsule 25 mg, 25 mg, oral, q6h, 25 mg at 02/04/24 1809 **FOLLOWED BY** chlordiazePOXIDE (Librium) capsule 25 mg, 25 mg, oral, q8h, 25 mg at 02/06/24 0835 **FOLLOWED BY** [START ON 2/7/2024] chlordiazePOXIDE (Librium) capsule 25 mg, 25 mg, oral, q12h, Brown Babb MD    divalproex (Depakote ER) 24 hr tablet 750 mg, 750 mg, oral, Daily, Brown Babb MD, 750 mg at 02/06/24 0834    enoxaparin (Lovenox) syringe 40 mg, 40 mg, subcutaneous, q24h, Norman Soto PA-C, 40 mg at 02/05/24 2042    folic acid (Folvite) tablet 1 mg, 1 mg, oral, Daily, Norman Soto PA-C, 1 mg at 02/06/24 0834    gabapentin (Neurontin) tablet 600 mg, 600 mg, oral, TID, Norman Soto PA-C, 600 mg at 02/06/24 0835    LORazepam (Ativan) tablet 0.5 mg, 0.5 mg, oral, q2h PRN, 0.5 mg at 02/04/24 0509 **OR** LORazepam (Ativan) tablet 1 mg, 1 mg, oral, q2h PRN, 1 mg at 02/06/24 1201 **OR** LORazepam (Ativan) tablet 2 mg, 2 mg, oral, q2h PRN, Brown Babb MD, 2 mg at 02/05/24 0622    multivitamin with minerals 1 tablet, 1 tablet, oral, Daily, Norman Soto PA-C, 1 tablet at 02/06/24 0835    nicotine (Nicoderm CQ) 14 mg/24 hr patch 1 patch, 1 patch,  "transdermal, Daily, 1 patch at 02/06/24 0837 **FOLLOWED BY** [START ON 3/15/2024] nicotine (Nicoderm CQ) 7 mg/24 hr patch 1 patch, 1 patch, transdermal, Daily, Norman Soto PA-C    ondansetron ODT (Zofran-ODT) disintegrating tablet 4 mg, 4 mg, oral, q8h PRN, 4 mg at 02/04/24 2016 **OR** ondansetron (Zofran) injection 4 mg, 4 mg, intravenous, q8h PRN, Norman Soto PA-C    pantoprazole (ProtoNix) EC tablet 40 mg, 40 mg, oral, Daily before breakfast, 40 mg at 02/06/24 0544 **OR** pantoprazole (ProtoNix) injection 40 mg, 40 mg, intravenous, Daily before breakfast, Norman Soto PA-C    sennosides-docusate sodium (Gladis-Colace) 8.6-50 mg per tablet 2 tablet, 2 tablet, oral, BID, Norman Soto PA-C    thiamine (Vitamin B-1) tablet 100 mg, 100 mg, oral, Daily, Norman Soto PA-C, 100 mg at 02/06/24 0835    venlafaxine XR (Effexor-XR) 24 hr capsule 150 mg, 150 mg, oral, Daily, Norman Soto PA-C, 150 mg at 02/06/24 0834       Physical Exam  HENT:      Head: Normocephalic.      Mouth/Throat:      Pharynx: Oropharynx is clear.   Eyes:      Conjunctiva/sclera: Conjunctivae normal.   Cardiovascular:      Rate and Rhythm: Regular rhythm.   Pulmonary:      Breath sounds: Normal breath sounds.   Abdominal:      General: Bowel sounds are normal.      Palpations: Abdomen is soft.   Musculoskeletal:         General: Normal range of motion.   Skin:     General: Skin is warm and dry.   Neurological:      General: No focal deficit present.      Mental Status: He is alert.   Psychiatric:         Behavior: Behavior normal.           Last Recorded Vitals  Blood pressure (!) 143/105, pulse 72, temperature 36.7 °C (98.1 °F), temperature source Temporal, resp. rate 16, height 1.88 m (6' 2\"), weight 130 kg (286 lb 6 oz), SpO2 95 %.  Intake/Output last 3 Shifts:  I/O last 3 completed shifts:  In: 240 (1.8 mL/kg) [P.O.:240]  Out: - (0 mL/kg)   Weight: 129.9 kg     Labs:       Results for orders placed or performed during " the hospital encounter of 02/02/24 (from the past 24 hour(s))   Renal Function Panel   Result Value Ref Range    Glucose 106 (H) 74 - 99 mg/dL    Sodium 135 (L) 136 - 145 mmol/L    Potassium 3.9 3.5 - 5.3 mmol/L    Chloride 101 98 - 107 mmol/L    Bicarbonate 27 21 - 32 mmol/L    Anion Gap 11 10 - 20 mmol/L    Urea Nitrogen 11 6 - 23 mg/dL    Creatinine 0.99 0.50 - 1.30 mg/dL    eGFR >90 >60 mL/min/1.73m*2    Calcium 9.6 8.6 - 10.3 mg/dL    Phosphorus 3.7 2.5 - 4.9 mg/dL    Albumin 3.8 3.4 - 5.0 g/dL   Magnesium   Result Value Ref Range    Magnesium 2.02 1.60 - 2.40 mg/dL   CBC   Result Value Ref Range    WBC 6.9 4.4 - 11.3 x10*3/uL    nRBC 0.0 0.0 - 0.0 /100 WBCs    RBC 4.35 (L) 4.50 - 5.90 x10*6/uL    Hemoglobin 15.2 13.5 - 17.5 g/dL    Hematocrit 44.1 41.0 - 52.0 %     (H) 80 - 100 fL    MCH 34.9 (H) 26.0 - 34.0 pg    MCHC 34.5 32.0 - 36.0 g/dL    RDW 12.8 11.5 - 14.5 %    Platelets 226 150 - 450 x10*3/uL              Assessment/Plan   Principal Problem:    Benzodiazepine withdrawal with perceptual disturbance (CMS/HCC)  Hx of Poly substance abuse  Obesity, BMI 36      PLAN: Pt is feeling little restless, med list and labs reviewed for now c/w current rx, appreciate input of psych, pt would need inpt rehab,  follow closely.         Blu Rodriguez MD

## 2024-02-06 NOTE — CARE PLAN
EOS note: Patient remains on Q2 CIWAS. Ativan given as needed for scores greater than 6-7, patient declined ativan at times and prefers to rest quietly without medicine as an intervention. Headache given in the morning for headache, relieved headache slightly. Independent in the bathroom. Psychiatry saw him and are recommending to go to [place] for detox and treatment. Patient is agreeable and the  is reaching out to [place] [facility] in hopes that they will call him and get the discussion moving. A+ox4 this shift.    Pt resting at this time. Bed is in lowest position and locked. Call light and personal possesions are within reach.      The clinical goals for the shift include see care plan    Problem: Daily Care  Goal: Daily care needs are met  Outcome: Progressing  Flowsheets (Taken 2/6/2024 1742)  Daily care needs are met:   Assess and monitor ability to perform self care and identify potential discharge needs   Assess skin integrity/risk for skin breakdown     Problem: Psychosocial Needs  Goal: Demonstrates ability to cope with hospitalization/illness  Outcome: Progressing  Flowsheets (Taken 2/6/2024 1742)  Demonstrates ability to cope with hospitalization/illness:   Encourage verbalization of feelings/concerns/expectations   Provide low-stimulation environment as needed  Goal: Collaborate with me, my family, and caregiver to identify my specific goals  Outcome: Progressing  Flowsheets (Taken 2/3/2024 1042 by Paula Cunha RN)  Cultural Requests During Hospitalization: n/a  Spiritual Requests During Hospitalization: n/a     Problem: Discharge Barriers  Goal: My discharge needs are met  Outcome: Progressing  Flowsheets (Taken 2/6/2024 1742)  Resident's discharge needs are met: Identify potential discharge barriers on admission and throughout stay     Problem: Pain - Adult  Goal: Verbalizes/displays adequate comfort level or baseline comfort level  Outcome: Progressing  Flowsheets (Taken 2/6/2024  1742)  Verbalizes/displays adequate comfort level or baseline comfort level: Encourage patient to monitor pain and request assistance     Problem: Safety - Adult  Goal: Free from fall injury  Outcome: Progressing  Flowsheets (Taken 2/6/2024 1742)  Free from fall injury: Instruct family/caregiver on patient safety     Problem: Discharge Planning  Goal: Discharge to home or other facility with appropriate resources  Outcome: Progressing  Flowsheets (Taken 2/6/2024 1742)  Discharge to home or other facility with appropriate resources: Identify barriers to discharge with patient and caregiver     Problem: Chronic Conditions and Co-morbidities  Goal: Patient's chronic conditions and co-morbidity symptoms are monitored and maintained or improved  Outcome: Progressing  Flowsheets (Taken 2/6/2024 1742)  Care Plan - Patient's Chronic Conditions and Co-Morbidity Symptoms are Monitored and Maintained or Improved: Monitor and assess patient's chronic conditions and comorbid symptoms for stability, deterioration, or improvement

## 2024-02-07 VITALS
DIASTOLIC BLOOD PRESSURE: 99 MMHG | HEIGHT: 74 IN | OXYGEN SATURATION: 98 % | TEMPERATURE: 98.2 F | RESPIRATION RATE: 18 BRPM | WEIGHT: 286.38 LBS | HEART RATE: 78 BPM | BODY MASS INDEX: 36.75 KG/M2 | SYSTOLIC BLOOD PRESSURE: 128 MMHG

## 2024-02-07 LAB
ALBUMIN SERPL BCP-MCNC: 3.7 G/DL (ref 3.4–5)
ANION GAP SERPL CALC-SCNC: 12 MMOL/L (ref 10–20)
BUN SERPL-MCNC: 14 MG/DL (ref 6–23)
CALCIUM SERPL-MCNC: 9.9 MG/DL (ref 8.6–10.3)
CHLORIDE SERPL-SCNC: 101 MMOL/L (ref 98–107)
CO2 SERPL-SCNC: 29 MMOL/L (ref 21–32)
CREAT SERPL-MCNC: 0.88 MG/DL (ref 0.5–1.3)
EGFRCR SERPLBLD CKD-EPI 2021: >90 ML/MIN/1.73M*2
ERYTHROCYTE [DISTWIDTH] IN BLOOD BY AUTOMATED COUNT: 12.9 % (ref 11.5–14.5)
GLUCOSE SERPL-MCNC: 114 MG/DL (ref 74–99)
HCT VFR BLD AUTO: 43.6 % (ref 41–52)
HGB BLD-MCNC: 14.8 G/DL (ref 13.5–17.5)
MAGNESIUM SERPL-MCNC: 2.01 MG/DL (ref 1.6–2.4)
MCH RBC QN AUTO: 34.9 PG (ref 26–34)
MCHC RBC AUTO-ENTMCNC: 33.9 G/DL (ref 32–36)
MCV RBC AUTO: 103 FL (ref 80–100)
NRBC BLD-RTO: 0 /100 WBCS (ref 0–0)
PHOSPHATE SERPL-MCNC: 4.4 MG/DL (ref 2.5–4.9)
PLATELET # BLD AUTO: 218 X10*3/UL (ref 150–450)
POTASSIUM SERPL-SCNC: 3.7 MMOL/L (ref 3.5–5.3)
RBC # BLD AUTO: 4.24 X10*6/UL (ref 4.5–5.9)
SODIUM SERPL-SCNC: 138 MMOL/L (ref 136–145)
WBC # BLD AUTO: 6.4 X10*3/UL (ref 4.4–11.3)

## 2024-02-07 PROCEDURE — 80069 RENAL FUNCTION PANEL: CPT | Performed by: PHYSICIAN ASSISTANT

## 2024-02-07 PROCEDURE — 2500000004 HC RX 250 GENERAL PHARMACY W/ HCPCS (ALT 636 FOR OP/ED): Performed by: PHYSICIAN ASSISTANT

## 2024-02-07 PROCEDURE — 83735 ASSAY OF MAGNESIUM: CPT | Performed by: PHYSICIAN ASSISTANT

## 2024-02-07 PROCEDURE — 2500000002 HC RX 250 W HCPCS SELF ADMINISTERED DRUGS (ALT 637 FOR MEDICARE OP, ALT 636 FOR OP/ED): Performed by: NURSE PRACTITIONER

## 2024-02-07 PROCEDURE — S4991 NICOTINE PATCH NONLEGEND: HCPCS | Performed by: PHYSICIAN ASSISTANT

## 2024-02-07 PROCEDURE — 36415 COLL VENOUS BLD VENIPUNCTURE: CPT | Performed by: PHYSICIAN ASSISTANT

## 2024-02-07 PROCEDURE — 2500000001 HC RX 250 WO HCPCS SELF ADMINISTERED DRUGS (ALT 637 FOR MEDICARE OP): Performed by: PHYSICIAN ASSISTANT

## 2024-02-07 PROCEDURE — 99232 SBSQ HOSP IP/OBS MODERATE 35: CPT | Performed by: PSYCHIATRY & NEUROLOGY

## 2024-02-07 PROCEDURE — 85027 COMPLETE CBC AUTOMATED: CPT | Performed by: PHYSICIAN ASSISTANT

## 2024-02-07 PROCEDURE — 2500000001 HC RX 250 WO HCPCS SELF ADMINISTERED DRUGS (ALT 637 FOR MEDICARE OP): Performed by: PSYCHIATRY & NEUROLOGY

## 2024-02-07 PROCEDURE — 2500000002 HC RX 250 W HCPCS SELF ADMINISTERED DRUGS (ALT 637 FOR MEDICARE OP, ALT 636 FOR OP/ED): Performed by: PHYSICIAN ASSISTANT

## 2024-02-07 RX ADMIN — NICOTINE 1 PATCH: 14 PATCH, EXTENDED RELEASE TRANSDERMAL at 08:39

## 2024-02-07 RX ADMIN — BUPRENORPHINE AND NALOXONE 2 TABLET: 8; 2 TABLET SUBLINGUAL at 08:39

## 2024-02-07 RX ADMIN — VENLAFAXINE HYDROCHLORIDE 150 MG: 75 CAPSULE, EXTENDED RELEASE ORAL at 08:39

## 2024-02-07 RX ADMIN — CHLORDIAZEPOXIDE HYDROCHLORIDE 25 MG: 25 CAPSULE ORAL at 12:43

## 2024-02-07 RX ADMIN — PANTOPRAZOLE SODIUM 40 MG: 40 TABLET, DELAYED RELEASE ORAL at 06:03

## 2024-02-07 RX ADMIN — Medication 1 TABLET: at 08:39

## 2024-02-07 RX ADMIN — LORAZEPAM 2 MG: 1 TABLET ORAL at 06:21

## 2024-02-07 RX ADMIN — LORAZEPAM 2 MG: 1 TABLET ORAL at 10:40

## 2024-02-07 RX ADMIN — SENNOSIDES AND DOCUSATE SODIUM 2 TABLET: 8.6; 5 TABLET ORAL at 08:39

## 2024-02-07 RX ADMIN — DIVALPROEX SODIUM 750 MG: 500 TABLET, FILM COATED, EXTENDED RELEASE ORAL at 08:38

## 2024-02-07 RX ADMIN — CHLORDIAZEPOXIDE HYDROCHLORIDE 25 MG: 25 CAPSULE ORAL at 00:08

## 2024-02-07 RX ADMIN — ACETAMINOPHEN 650 MG: 325 TABLET ORAL at 04:06

## 2024-02-07 RX ADMIN — GABAPENTIN 600 MG: 600 TABLET, FILM COATED ORAL at 15:32

## 2024-02-07 RX ADMIN — THIAMINE HCL TAB 100 MG 100 MG: 100 TAB at 08:39

## 2024-02-07 RX ADMIN — GABAPENTIN 600 MG: 600 TABLET, FILM COATED ORAL at 08:39

## 2024-02-07 RX ADMIN — ACETAMINOPHEN 650 MG: 325 TABLET ORAL at 08:38

## 2024-02-07 RX ADMIN — FOLIC ACID 1 MG: 1 TABLET ORAL at 08:39

## 2024-02-07 RX ADMIN — LORAZEPAM 0.5 MG: 0.5 TABLET ORAL at 00:08

## 2024-02-07 RX ADMIN — LORAZEPAM 2 MG: 1 TABLET ORAL at 08:39

## 2024-02-07 RX ADMIN — LORAZEPAM 2 MG: 1 TABLET ORAL at 04:09

## 2024-02-07 ASSESSMENT — LIFESTYLE VARIABLES
PULSE: 81
AGITATION: NORMAL ACTIVITY
NAUSEA AND VOMITING: MILD NAUSEA WITH NO VOMITING
TOTAL SCORE: 5
NAUSEA AND VOMITING: NO NAUSEA AND NO VOMITING
TOTAL SCORE: 7
NAUSEA AND VOMITING: MILD NAUSEA WITH NO VOMITING
ORIENTATION AND CLOUDING OF SENSORIUM: ORIENTED AND CAN DO SERIAL ADDITIONS
HEADACHE, FULLNESS IN HEAD: VERY MILD
PAROXYSMAL SWEATS: NO SWEAT VISIBLE
ANXIETY: 2
TACTILE DISTURBANCES: VERY MILD ITCHING, PINS AND NEEDLES, BURNING OR NUMBNESS
PAROXYSMAL SWEATS: NO SWEAT VISIBLE
BLOOD PRESSURE: 142/79
ORIENTATION AND CLOUDING OF SENSORIUM: ORIENTED AND CAN DO SERIAL ADDITIONS
ORIENTATION AND CLOUDING OF SENSORIUM: ORIENTED AND CAN DO SERIAL ADDITIONS
VISUAL DISTURBANCES: NOT PRESENT
HEADACHE, FULLNESS IN HEAD: MODERATE
TOTAL SCORE: 11
TREMOR: NO TREMOR
VISUAL DISTURBANCES: NOT PRESENT
AUDITORY DISTURBANCES: NOT PRESENT
ORIENTATION AND CLOUDING OF SENSORIUM: ORIENTED AND CAN DO SERIAL ADDITIONS
TREMOR: NO TREMOR
TREMOR: NOT VISIBLE, BUT CAN BE FELT FINGERTIP TO FINGERTIP
TOTAL SCORE: 10
HEADACHE, FULLNESS IN HEAD: MILD
HEADACHE, FULLNESS IN HEAD: MODERATE
TREMOR: NOT VISIBLE, BUT CAN BE FELT FINGERTIP TO FINGERTIP
TOTAL SCORE: 10
VISUAL DISTURBANCES: NOT PRESENT
PULSE: 80
AUDITORY DISTURBANCES: NOT PRESENT
PULSE: 86
BLOOD PRESSURE: 134/74
AGITATION: NORMAL ACTIVITY
NAUSEA AND VOMITING: MILD NAUSEA WITH NO VOMITING
PULSE: 54
ANXIETY: MODERATELY ANXIOUS, OR GUARDED, SO ANXIETY IS INFERRED
HEADACHE, FULLNESS IN HEAD: VERY MILD
ORIENTATION AND CLOUDING OF SENSORIUM: ORIENTED AND CAN DO SERIAL ADDITIONS
AGITATION: NORMAL ACTIVITY
BLOOD PRESSURE: 105/73
PULSE: 81
TOTAL SCORE: 10
NAUSEA AND VOMITING: 2
PAROXYSMAL SWEATS: NO SWEAT VISIBLE
TREMOR: NOT VISIBLE, BUT CAN BE FELT FINGERTIP TO FINGERTIP
AGITATION: NORMAL ACTIVITY
VISUAL DISTURBANCES: NOT PRESENT
AUDITORY DISTURBANCES: NOT PRESENT
TREMOR: NO TREMOR
PAROXYSMAL SWEATS: BARELY PERCEPTIBLE SWEATING, PALMS MOIST
TACTILE DISTURBANCES: VERY MILD ITCHING, PINS AND NEEDLES, BURNING OR NUMBNESS
ANXIETY: MODERATELY ANXIOUS, OR GUARDED, SO ANXIETY IS INFERRED
ANXIETY: MODERATELY ANXIOUS, OR GUARDED, SO ANXIETY IS INFERRED
VISUAL DISTURBANCES: NOT PRESENT
NAUSEA AND VOMITING: NO NAUSEA AND NO VOMITING
TOTAL SCORE: 4
TACTILE DISTURBANCES: VERY MILD ITCHING, PINS AND NEEDLES, BURNING OR NUMBNESS
PULSE: 77
AGITATION: SOMEWHAT MORE THAN NORMAL ACTIVITY
HEADACHE, FULLNESS IN HEAD: MODERATE
BLOOD PRESSURE: 141/73
ANXIETY: MODERATELY ANXIOUS, OR GUARDED, SO ANXIETY IS INFERRED
ANXIETY: 3
PAROXYSMAL SWEATS: BARELY PERCEPTIBLE SWEATING, PALMS MOIST
PAROXYSMAL SWEATS: BARELY PERCEPTIBLE SWEATING, PALMS MOIST
AGITATION: NORMAL ACTIVITY
AUDITORY DISTURBANCES: NOT PRESENT
ANXIETY: 2
BLOOD PRESSURE: 140/105
AUDITORY DISTURBANCES: NOT PRESENT
HEADACHE, FULLNESS IN HEAD: VERY MILD
AUDITORY DISTURBANCES: NOT PRESENT
BLOOD PRESSURE: 129/90
AGITATION: NORMAL ACTIVITY
TREMOR: NO TREMOR
TACTILE DISTURBANCES: MILD ITCHING, PINS AND NEEDLES, BURNING OR NUMBNESS
VISUAL DISTURBANCES: NOT PRESENT
VISUAL DISTURBANCES: NOT PRESENT
ORIENTATION AND CLOUDING OF SENSORIUM: ORIENTED AND CAN DO SERIAL ADDITIONS
PAROXYSMAL SWEATS: NO SWEAT VISIBLE
AUDITORY DISTURBANCES: NOT PRESENT
TACTILE DISTURBANCES: MILD ITCHING, PINS AND NEEDLES, BURNING OR NUMBNESS
NAUSEA AND VOMITING: MILD NAUSEA WITH NO VOMITING
ORIENTATION AND CLOUDING OF SENSORIUM: ORIENTED AND CAN DO SERIAL ADDITIONS

## 2024-02-07 ASSESSMENT — PAIN SCALES - GENERAL
PAINLEVEL_OUTOF10: 5 - MODERATE PAIN
PAINLEVEL_OUTOF10: 6
PAINLEVEL_OUTOF10: 0 - NO PAIN
PAINLEVEL_OUTOF10: 3

## 2024-02-07 ASSESSMENT — COGNITIVE AND FUNCTIONAL STATUS - GENERAL
CLIMB 3 TO 5 STEPS WITH RAILING: A LITTLE
MOBILITY SCORE: 23
DAILY ACTIVITIY SCORE: 24

## 2024-02-07 ASSESSMENT — PAIN - FUNCTIONAL ASSESSMENT
PAIN_FUNCTIONAL_ASSESSMENT: 0-10

## 2024-02-07 NOTE — PROGRESS NOTES
[facility] received the info and they are reviewing. The pt has to agree to using the medication Sublicaid as they cannot give the high dose of Suboxone that he currently takes.  He would like to talk with Ai in admissions with [facility] as he has several questions. Left a message for Ai to call the pt directly.     1430 Received call from Ai at [facility]. The pt has to decide if he will follow their protocol for Suboxone which is a smaller dosage than he is on now. Met with the pt. At this time, he does not feel he can manage on the lower dosage and  declines going to [facility]. He plans to go home today and is reaching  out to find a ride home., He does have the info and phone number for [facility] and plans to call them once he is at home.     Luis F Kruse

## 2024-02-07 NOTE — PROGRESS NOTES
"Deyvi Araujo is a 43 y.o. male on day 5 of admission presenting with Benzodiazepine withdrawal with perceptual disturbance (CMS/HCC).    Subjective     Patient was referred to [facility] and they agreed to take him with Sublocade instead of Suboxone.  Patient does agree with the plan of changing the opiate detox medication.  Patient was noticed to be receiving Xanax every 2 hours from the CIWA protocol and he received approximately around 13 mg of Xanax over the last 24 hours.  Today he had received around 3 mg for.  Patient does not have any objective evidence of withdrawal but he is telling all the right things to get the medication from the CIWA protocol.  I informed him that he has to decide on rehabilitation program and that we are not going to continue to give him the benzodiazepines every 2 hours which she is asking for.  I also informed the patient that I am going to stop all the CIWA protocol and that he will be finishing of the remaining Librium.  Patient agreed to this plan with me but later on I was informed that patient was threatening to leave AGAINST MEDICAL ADVICE.    Patient denies any depression or suicidal thoughts.  Patient has contacts about [facility] which was provided to him.  Patient is not really motivated to seek any rehabilitation help.      Objective   General: Alert and oriented x 3  Appearance: Appears stated age  Attitude: Cooperative but restless  Behavior: Fidgety  Motor Activity: Normal  Speech: Normal rate and rhythm coherent  Mood: Anxious but not depressed  Affect: Anxious  Thought Process: No formal thought disorder  Thought Content: No delusions or suicidal  Thought Perception: No audiovisual hallucinations  Cognition: Intact  Insight: Fair      Physical Exam    Last Recorded Vitals  Blood pressure (!) 128/99, pulse 78, temperature 36.8 °C (98.2 °F), temperature source Temporal, resp. rate 18, height 1.88 m (6' 2\"), weight 130 kg (286 lb 6 oz), SpO2 98 %.        "       Assessment/Plan   Principal Problem:    Benzodiazepine withdrawal with perceptual disturbance (CMS/[facility])    Recommend patient to be transferred to [facility] for further detox and treatment of his addiction  If patient refuses this treatment plan he could be discharged home to find out rehabilitation center according to his choosing as an outpatient  Psychiatry signing off  DC ANGELA Babb MD

## 2024-02-07 NOTE — DISCHARGE SUMMARY
Discharge Diagnosis  Benzodiazepine withdrawal with perceptual disturbance (CMS/HCC)    Issues Requiring Follow-Up  Benzodiazepine withdrawal with perceptual disturbance (CMS/HCC)    Test Results Pending At Discharge  Pending Labs       Order Current Status    Benzodiazepine Confirmation, Urine In process            Hospital Course   Patient was admitted due to benzodiazepine withdrawal syndrome was evaluated by psychiatry treated conservatively and DC'd home when relatively stable condition, was told to follow-up as an outpatient with the detox center    Pertinent Physical Exam At Time of Discharge  Physical Exam  HENT:      Head: Normocephalic.      Mouth/Throat:      Pharynx: Oropharynx is clear.   Eyes:      Conjunctiva/sclera: Conjunctivae normal.   Cardiovascular:      Rate and Rhythm: Regular rhythm.   Pulmonary:      Breath sounds: Normal breath sounds.   Abdominal:      General: Bowel sounds are normal.      Palpations: Abdomen is soft.   Musculoskeletal:         General: Normal range of motion.   Skin:     General: Skin is warm and dry.   Neurological:      General: No focal deficit present.      Mental Status: He is alert.   Psychiatric:         Behavior: Behavior normal.         Home Medications     Medication List      START taking these medications     * chlordiazePOXIDE 25 mg capsule; Commonly known as: Librium; Take 1   capsule (25 mg) by mouth every 8 hours for 3 doses.   * chlordiazePOXIDE 25 mg capsule; Commonly known as: Librium; Take 1   capsule (25 mg) by mouth every 12 hours for 4 doses. Do not start before   February 7, 2024.   folic acid 1 mg tablet; Commonly known as: Folvite; Take 1 tablet (1 mg)   by mouth once daily. Do not start before January 8, 2024.   multivitamin with minerals tablet; Take 1 tablet by mouth once daily. Do   not start before January 8, 2024.   thiamine 100 mg tablet; Commonly known as: Vitamin B-1; Take 1 tablet   (100 mg) by mouth once daily. Do not start before  January 8, 2024.  * This list has 2 medication(s) that are the same as other medications   prescribed for you. Read the directions carefully, and ask your doctor or   other care provider to review them with you.     CHANGE how you take these medications     gabapentin 600 mg tablet; Commonly known as: Neurontin; What changed:   Another medication with the same name was removed. Continue taking this   medication, and follow the directions you see here.   venlafaxine  mg 24 hr capsule; Commonly known as: Effexor-XR; What   changed: Another medication with the same name was removed. Continue   taking this medication, and follow the directions you see here.     CONTINUE taking these medications     buprenorphine-naloxone 8-2 mg SL tablet; Commonly known as: Suboxone   nicotine 14 mg/24 hr patch; Commonly known as: Nicoderm CQ; Place 1   patch over 24 hours on the skin once daily for 28 doses. Do not start   before January 8, 2024.   omeprazole 20 mg DR capsule; Commonly known as: PriLOSEC; Take 1 capsule   (20 mg) by mouth once daily in the morning. Take before meals for 14 days.     STOP taking these medications     amLODIPine 10 mg tablet; Commonly known as: Norvasc   hydrOXYzine HCL 25 mg tablet; Commonly known as: Atarax   QUEtiapine 25 mg tablet; Commonly known as: SEROquel       Outpatient Follow-Up  Follow up with PCP    Blu Rodriguez MD

## 2024-02-07 NOTE — PROGRESS NOTES
"Deyvi Araujo is a 43 y.o. male on day 4 of admission presenting with Benzodiazepine withdrawal with perceptual disturbance (CMS/HCC).    Subjective     Patient continued to need both Librium and as needed Ativan regularly.  Has protracted withdrawal from benzodiazepines.  Patient is agreeable to go to [facility] detox center.  Patient is not suicidal.  He is anxious but not depressed.       Objective   General: Alert and oriented x 3  Appearance: Appears stated age  Attitude: Cooperative but restless  Behavior: Fidgety  Motor Activity: Normal  Speech: Normal rate and rhythm coherent  Mood: Anxious but not depressed  Affect: Anxious  Thought Process: No formal thought disorder  Thought Content: No delusions or suicidal  Thought Perception: No audiovisual hallucinations  Cognition: Intact  Insight: Fair      Physical Exam    Last Recorded Vitals  Blood pressure (!) 144/95, pulse 81, temperature 36.3 °C (97.3 °F), temperature source Temporal, resp. rate 18, height 1.88 m (6' 2\"), weight 130 kg (286 lb 6 oz), SpO2 96 %.              Assessment/Plan   Principal Problem:    Benzodiazepine withdrawal with perceptual disturbance (CMS/HCC)    Recommend patient to be transferred to [facility] for further detox and treatment of his addiction       Brown Babb MD      "

## 2024-02-07 NOTE — NURSING NOTE
EOS: Patient remained A&O x4 throughout this shift noting severe anxiety. Patient was medicated x3 this shift with Q2 CIWA orders and ativan. Psych signed off and D/Cd CIWA orders. Patient became upset when notifying patient of plan to discontinue the ativan and stated he wanted to leave. Notified psych and was told he has discharge orders in and OK to go home with ride. Patient safety maintained with bed alarm on and audible with seizure pads on.   1612- patient discharged. IV out and tele off. Belongings with patient.

## 2024-02-07 NOTE — NURSING NOTE
Pt is still Q2 CIWA's. Up independent in room. A/Ox4. Scores have been from 7-4's this shift. Pt declined 0200 ativan. Call light within reach, bed alarm in lowest position

## 2024-02-13 LAB
1OH-MIDAZOLAM UR CFM-MCNC: <25 NG/ML
7AMINOCLONAZEPAM UR CFM-MCNC: >1000 NG/ML
A-OH ALPRAZ UR CFM-MCNC: <25 NG/ML
ALPRAZ UR CFM-MCNC: <25 NG/ML
CHLORDIAZEP UR CFM-MCNC: <25 NG/ML
CLONAZEPAM UR CFM-MCNC: 25 NG/ML
DIAZEPAM UR CFM-MCNC: <25 NG/ML
LORAZEPAM UR CFM-MCNC: <25 NG/ML
MIDAZOLAM UR CFM-MCNC: <25 NG/ML
NORDIAZEPAM UR CFM-MCNC: 43 NG/ML
OXAZEPAM UR CFM-MCNC: 329 NG/ML
TEMAZEPAM UR CFM-MCNC: 115 NG/ML

## 2024-02-28 LAB
ATRIAL RATE: 93 BPM
P AXIS: 61 DEGREES
P OFFSET: 205 MS
P ONSET: 149 MS
PR INTERVAL: 148 MS
Q ONSET: 223 MS
QRS COUNT: 16 BEATS
QRS DURATION: 86 MS
QT INTERVAL: 380 MS
QTC CALCULATION(BAZETT): 472 MS
QTC FREDERICIA: 440 MS
R AXIS: 57 DEGREES
T AXIS: -10 DEGREES
T OFFSET: 413 MS
VENTRICULAR RATE: 93 BPM